# Patient Record
Sex: MALE | Race: WHITE | Employment: OTHER | URBAN - NONMETROPOLITAN AREA
[De-identification: names, ages, dates, MRNs, and addresses within clinical notes are randomized per-mention and may not be internally consistent; named-entity substitution may affect disease eponyms.]

---

## 2017-03-02 ENCOUNTER — OFFICE VISIT (OUTPATIENT)
Dept: FAMILY MEDICINE CLINIC | Age: 57
End: 2017-03-02
Payer: MEDICARE

## 2017-03-02 ENCOUNTER — HOSPITAL ENCOUNTER (OUTPATIENT)
Dept: OTHER | Age: 57
Discharge: HOME | End: 2017-03-02
Attending: FAMILY MEDICINE | Admitting: FAMILY MEDICINE

## 2017-03-02 VITALS
RESPIRATION RATE: 18 BRPM | OXYGEN SATURATION: 98 % | HEIGHT: 71 IN | WEIGHT: 220 LBS | BODY MASS INDEX: 30.8 KG/M2 | DIASTOLIC BLOOD PRESSURE: 72 MMHG | HEART RATE: 77 BPM | SYSTOLIC BLOOD PRESSURE: 122 MMHG

## 2017-03-02 DIAGNOSIS — E11.8 TYPE 2 DIABETES MELLITUS WITH COMPLICATION, WITH LONG-TERM CURRENT USE OF INSULIN (HCC): Primary | ICD-10-CM

## 2017-03-02 DIAGNOSIS — R53.83 FATIGUE, UNSPECIFIED TYPE: ICD-10-CM

## 2017-03-02 DIAGNOSIS — R21 RASH OF BODY: ICD-10-CM

## 2017-03-02 DIAGNOSIS — Z79.4 TYPE 2 DIABETES MELLITUS WITH COMPLICATION, WITH LONG-TERM CURRENT USE OF INSULIN (HCC): ICD-10-CM

## 2017-03-02 DIAGNOSIS — Z79.4 TYPE 2 DIABETES MELLITUS WITH COMPLICATION, WITH LONG-TERM CURRENT USE OF INSULIN (HCC): Primary | ICD-10-CM

## 2017-03-02 DIAGNOSIS — E11.8 TYPE 2 DIABETES MELLITUS WITH COMPLICATION, WITH LONG-TERM CURRENT USE OF INSULIN (HCC): ICD-10-CM

## 2017-03-02 LAB
A/G RATIO: 1.4 (ref 0.8–2)
ALBUMIN SERPL-MCNC: 4 G/DL (ref 3.4–4.8)
ALP BLD-CCNC: 196 U/L (ref 25–100)
ALT SERPL-CCNC: 24 U/L (ref 4–36)
ANION GAP SERPL CALCULATED.3IONS-SCNC: 11 MMOL/L (ref 3–16)
AST SERPL-CCNC: 26 U/L (ref 8–33)
BILIRUB SERPL-MCNC: 0.7 MG/DL (ref 0.3–1.2)
BUN BLDV-MCNC: 20 MG/DL (ref 6–20)
CALCIUM SERPL-MCNC: 9.4 MG/DL (ref 8.5–10.5)
CHLORIDE BLD-SCNC: 100 MMOL/L (ref 98–107)
CHOLESTEROL, TOTAL: 123 MG/DL (ref 0–200)
CO2: 28 MMOL/L (ref 20–30)
CREAT SERPL-MCNC: 1.4 MG/DL (ref 0.4–1.2)
GAMMA GLUTAMYL TRANSFERASE: 203 U/L (ref 8–61)
GFR AFRICAN AMERICAN: >59
GFR NON-AFRICAN AMERICAN: 52
GLOBULIN: 2.8 G/DL
GLUCOSE BLD-MCNC: 190 MG/DL (ref 74–106)
HBA1C MFR BLD: 7.7 %
HCT VFR BLD CALC: 46.5 % (ref 40–54)
HDLC SERPL-MCNC: 33 MG/DL (ref 40–60)
HEMOGLOBIN: 15.4 G/DL (ref 13–18)
LDL CHOLESTEROL CALCULATED: 56 MG/DL
MAGNESIUM: 2 MG/DL (ref 1.7–2.4)
MCH RBC QN AUTO: 28.4 PG (ref 27–32)
MCHC RBC AUTO-ENTMCNC: 33.1 G/DL (ref 31–35)
MCV RBC AUTO: 85.6 FL (ref 80–100)
PDW BLD-RTO: 15.8 % (ref 11–16)
PLATELET # BLD: 179 K/UL (ref 150–400)
PMV BLD AUTO: 10.1 FL (ref 6–10)
POTASSIUM SERPL-SCNC: 5.2 MMOL/L (ref 3.4–5.1)
RBC # BLD: 5.43 M/UL (ref 4.5–6)
SODIUM BLD-SCNC: 139 MMOL/L (ref 136–145)
TOTAL PROTEIN: 6.8 G/DL (ref 6.4–8.3)
TRIGL SERPL-MCNC: 171 MG/DL (ref 0–249)
TSH SERPL DL<=0.05 MIU/L-ACNC: 7.95 UIU/ML (ref 0.35–5.5)
VLDLC SERPL CALC-MCNC: 34 MG/DL
WBC # BLD: 11.3 K/UL (ref 4–11)

## 2017-03-02 PROCEDURE — 99214 OFFICE O/P EST MOD 30 MIN: CPT | Performed by: FAMILY MEDICINE

## 2017-03-02 RX ORDER — LEVOTHYROXINE SODIUM 50 MCG
50 TABLET ORAL DAILY
Qty: 30 TABLET | Refills: 5 | Status: SHIPPED | OUTPATIENT
Start: 2017-03-02 | End: 2017-06-02

## 2017-03-02 RX ORDER — OXYCODONE AND ACETAMINOPHEN 10; 325 MG/1; MG/1
1 TABLET ORAL EVERY 6 HOURS PRN
Qty: 90 TABLET | Refills: 0 | Status: SHIPPED | OUTPATIENT
Start: 2017-03-02 | End: 2017-06-01 | Stop reason: SDUPTHER

## 2017-03-02 RX ORDER — PRAVASTATIN SODIUM 40 MG
TABLET ORAL
Qty: 30 TABLET | Refills: 5 | Status: SHIPPED | OUTPATIENT
Start: 2017-03-02 | End: 2017-08-28 | Stop reason: SDUPTHER

## 2017-03-02 ASSESSMENT — ENCOUNTER SYMPTOMS: BACK PAIN: 1

## 2017-06-01 ENCOUNTER — OFFICE VISIT (OUTPATIENT)
Dept: FAMILY MEDICINE CLINIC | Age: 57
End: 2017-06-01
Payer: MEDICARE

## 2017-06-01 ENCOUNTER — HOSPITAL ENCOUNTER (OUTPATIENT)
Dept: OTHER | Age: 57
Discharge: HOME | End: 2017-06-01
Attending: FAMILY MEDICINE | Admitting: FAMILY MEDICINE

## 2017-06-01 VITALS
OXYGEN SATURATION: 97 % | HEIGHT: 71 IN | RESPIRATION RATE: 18 BRPM | BODY MASS INDEX: 30.24 KG/M2 | WEIGHT: 216 LBS | HEART RATE: 84 BPM | SYSTOLIC BLOOD PRESSURE: 110 MMHG | DIASTOLIC BLOOD PRESSURE: 60 MMHG

## 2017-06-01 DIAGNOSIS — I10 ESSENTIAL HYPERTENSION: ICD-10-CM

## 2017-06-01 DIAGNOSIS — Z79.4 TYPE 2 DIABETES MELLITUS WITH COMPLICATION, WITH LONG-TERM CURRENT USE OF INSULIN (HCC): Primary | ICD-10-CM

## 2017-06-01 DIAGNOSIS — G89.29 CHRONIC MIDLINE LOW BACK PAIN WITH BILATERAL SCIATICA: ICD-10-CM

## 2017-06-01 DIAGNOSIS — M54.42 CHRONIC MIDLINE LOW BACK PAIN WITH BILATERAL SCIATICA: ICD-10-CM

## 2017-06-01 DIAGNOSIS — M19.90 ARTHRITIS: ICD-10-CM

## 2017-06-01 DIAGNOSIS — E11.8 TYPE 2 DIABETES MELLITUS WITH COMPLICATION, WITH LONG-TERM CURRENT USE OF INSULIN (HCC): Primary | ICD-10-CM

## 2017-06-01 DIAGNOSIS — E03.9 HYPOTHYROIDISM, UNSPECIFIED TYPE: ICD-10-CM

## 2017-06-01 DIAGNOSIS — E11.8 TYPE 2 DIABETES MELLITUS WITH COMPLICATION, WITH LONG-TERM CURRENT USE OF INSULIN (HCC): ICD-10-CM

## 2017-06-01 DIAGNOSIS — E11.9 COMPREHENSIVE DIABETIC FOOT EXAMINATION, TYPE 2 DM, ENCOUNTER FOR (HCC): ICD-10-CM

## 2017-06-01 DIAGNOSIS — M54.41 CHRONIC MIDLINE LOW BACK PAIN WITH BILATERAL SCIATICA: ICD-10-CM

## 2017-06-01 DIAGNOSIS — Z79.4 TYPE 2 DIABETES MELLITUS WITH COMPLICATION, WITH LONG-TERM CURRENT USE OF INSULIN (HCC): ICD-10-CM

## 2017-06-01 LAB
A/G RATIO: 1.3 (ref 0.8–2)
ALBUMIN SERPL-MCNC: 4.1 G/DL (ref 3.4–4.8)
ALP BLD-CCNC: 200 U/L (ref 25–100)
ALT SERPL-CCNC: 26 U/L (ref 4–36)
ANION GAP SERPL CALCULATED.3IONS-SCNC: 14 MMOL/L (ref 3–16)
AST SERPL-CCNC: 32 U/L (ref 8–33)
BILIRUB SERPL-MCNC: 0.9 MG/DL (ref 0.3–1.2)
BUN BLDV-MCNC: 23 MG/DL (ref 6–20)
CALCIUM SERPL-MCNC: 9.4 MG/DL (ref 8.5–10.5)
CHLORIDE BLD-SCNC: 99 MMOL/L (ref 98–107)
CO2: 27 MMOL/L (ref 20–30)
CREAT SERPL-MCNC: 1.5 MG/DL (ref 0.4–1.2)
GFR AFRICAN AMERICAN: 59
GFR NON-AFRICAN AMERICAN: 48
GLOBULIN: 3.2 G/DL
GLUCOSE BLD-MCNC: 173 MG/DL (ref 74–106)
HBA1C MFR BLD: 7.9 %
HCT VFR BLD CALC: 49.2 % (ref 40–54)
HEMOGLOBIN: 16.4 G/DL (ref 13–18)
MAGNESIUM: 2.1 MG/DL (ref 1.7–2.4)
MCH RBC QN AUTO: 28.9 PG (ref 27–32)
MCHC RBC AUTO-ENTMCNC: 33.3 G/DL (ref 31–35)
MCV RBC AUTO: 86.8 FL (ref 80–100)
PDW BLD-RTO: 16.2 % (ref 11–16)
PLATELET # BLD: 192 K/UL (ref 150–400)
PMV BLD AUTO: 9.8 FL (ref 6–10)
POTASSIUM SERPL-SCNC: 4.9 MMOL/L (ref 3.4–5.1)
RBC # BLD: 5.67 M/UL (ref 4.5–6)
SODIUM BLD-SCNC: 140 MMOL/L (ref 136–145)
TOTAL PROTEIN: 7.3 G/DL (ref 6.4–8.3)
TSH SERPL DL<=0.05 MIU/L-ACNC: 7.76 UIU/ML (ref 0.35–5.5)
WBC # BLD: 11.8 K/UL (ref 4–11)

## 2017-06-01 PROCEDURE — 99214 OFFICE O/P EST MOD 30 MIN: CPT | Performed by: FAMILY MEDICINE

## 2017-06-01 RX ORDER — OXYCODONE AND ACETAMINOPHEN 10; 325 MG/1; MG/1
1 TABLET ORAL EVERY 6 HOURS PRN
Qty: 90 TABLET | Refills: 0 | Status: SHIPPED | OUTPATIENT
Start: 2017-06-01 | End: 2017-08-28 | Stop reason: SDUPTHER

## 2017-06-01 RX ORDER — GABAPENTIN 300 MG/1
CAPSULE ORAL
Qty: 90 CAPSULE | Refills: 5 | Status: SHIPPED | OUTPATIENT
Start: 2017-06-01 | End: 2018-02-19 | Stop reason: SDUPTHER

## 2017-06-01 RX ORDER — TACROLIMUS 1 MG/1
1 CAPSULE ORAL 2 TIMES DAILY
Qty: 60 CAPSULE | Refills: 5 | Status: SHIPPED | OUTPATIENT
Start: 2017-06-01 | End: 2018-02-19 | Stop reason: SDUPTHER

## 2017-06-01 RX ORDER — TRAZODONE HYDROCHLORIDE 150 MG/1
150 TABLET ORAL NIGHTLY
Qty: 30 TABLET | Refills: 5 | Status: SHIPPED | OUTPATIENT
Start: 2017-06-01 | End: 2018-01-11 | Stop reason: SDUPTHER

## 2017-06-01 ASSESSMENT — PATIENT HEALTH QUESTIONNAIRE - PHQ9
1. LITTLE INTEREST OR PLEASURE IN DOING THINGS: 0
2. FEELING DOWN, DEPRESSED OR HOPELESS: 0
SUM OF ALL RESPONSES TO PHQ9 QUESTIONS 1 & 2: 0
SUM OF ALL RESPONSES TO PHQ QUESTIONS 1-9: 0

## 2017-06-01 ASSESSMENT — ENCOUNTER SYMPTOMS: BACK PAIN: 1

## 2017-06-02 LAB — GAMMA GLUTAMYL TRANSFERASE: 232 U/L (ref 8–61)

## 2017-06-02 RX ORDER — LEVOTHYROXINE SODIUM 75 MCG
75 TABLET ORAL DAILY
Qty: 30 TABLET | Refills: 2 | Status: SHIPPED | OUTPATIENT
Start: 2017-06-02 | End: 2017-08-28 | Stop reason: SDUPTHER

## 2017-06-06 ENCOUNTER — TELEPHONE (OUTPATIENT)
Dept: FAMILY MEDICINE CLINIC | Age: 57
End: 2017-06-06

## 2017-06-21 ENCOUNTER — TELEPHONE (OUTPATIENT)
Dept: FAMILY MEDICINE CLINIC | Age: 57
End: 2017-06-21

## 2017-08-28 ENCOUNTER — OFFICE VISIT (OUTPATIENT)
Dept: FAMILY MEDICINE CLINIC | Age: 57
End: 2017-08-28
Payer: MEDICARE

## 2017-08-28 ENCOUNTER — HOSPITAL ENCOUNTER (OUTPATIENT)
Dept: OTHER | Age: 57
Discharge: HOME | End: 2017-08-28
Attending: TRANSPLANT SURGERY | Admitting: TRANSPLANT SURGERY

## 2017-08-28 VITALS
RESPIRATION RATE: 18 BRPM | OXYGEN SATURATION: 98 % | SYSTOLIC BLOOD PRESSURE: 110 MMHG | DIASTOLIC BLOOD PRESSURE: 62 MMHG | HEIGHT: 71 IN | BODY MASS INDEX: 30.63 KG/M2 | WEIGHT: 218.8 LBS | HEART RATE: 77 BPM

## 2017-08-28 DIAGNOSIS — M54.41 CHRONIC MIDLINE LOW BACK PAIN WITH BILATERAL SCIATICA: ICD-10-CM

## 2017-08-28 DIAGNOSIS — E11.8 TYPE 2 DIABETES MELLITUS WITH COMPLICATION, WITH LONG-TERM CURRENT USE OF INSULIN (HCC): Primary | ICD-10-CM

## 2017-08-28 DIAGNOSIS — E03.9 HYPOTHYROIDISM, UNSPECIFIED TYPE: ICD-10-CM

## 2017-08-28 DIAGNOSIS — Z94.4 HISTORY OF LIVER TRANSPLANT (HCC): ICD-10-CM

## 2017-08-28 DIAGNOSIS — E11.8 TYPE 2 DIABETES MELLITUS WITH COMPLICATION, WITH LONG-TERM CURRENT USE OF INSULIN (HCC): ICD-10-CM

## 2017-08-28 DIAGNOSIS — Z79.4 TYPE 2 DIABETES MELLITUS WITH COMPLICATION, WITH LONG-TERM CURRENT USE OF INSULIN (HCC): ICD-10-CM

## 2017-08-28 DIAGNOSIS — I10 ESSENTIAL HYPERTENSION: ICD-10-CM

## 2017-08-28 DIAGNOSIS — M54.42 CHRONIC MIDLINE LOW BACK PAIN WITH BILATERAL SCIATICA: ICD-10-CM

## 2017-08-28 DIAGNOSIS — G89.29 CHRONIC MIDLINE LOW BACK PAIN WITH BILATERAL SCIATICA: ICD-10-CM

## 2017-08-28 DIAGNOSIS — M19.90 ARTHRITIS: ICD-10-CM

## 2017-08-28 DIAGNOSIS — Z79.4 TYPE 2 DIABETES MELLITUS WITH COMPLICATION, WITH LONG-TERM CURRENT USE OF INSULIN (HCC): Primary | ICD-10-CM

## 2017-08-28 DIAGNOSIS — F41.9 ANXIETY: ICD-10-CM

## 2017-08-28 PROCEDURE — 99214 OFFICE O/P EST MOD 30 MIN: CPT | Performed by: FAMILY MEDICINE

## 2017-08-28 RX ORDER — CLONAZEPAM 1 MG/1
1 TABLET ORAL 2 TIMES DAILY PRN
Qty: 60 TABLET | Refills: 2 | Status: SHIPPED | OUTPATIENT
Start: 2017-08-28 | End: 2018-02-19 | Stop reason: SDUPTHER

## 2017-08-28 RX ORDER — LANOLIN ALCOHOL/MO/W.PET/CERES
400 CREAM (GRAM) TOPICAL DAILY
Qty: 30 TABLET | Refills: 5 | Status: SHIPPED | OUTPATIENT
Start: 2017-08-28 | End: 2017-11-28 | Stop reason: ALTCHOICE

## 2017-08-28 RX ORDER — LEVOTHYROXINE SODIUM 75 MCG
75 TABLET ORAL DAILY
Qty: 30 TABLET | Refills: 5 | Status: SHIPPED | OUTPATIENT
Start: 2017-08-28 | End: 2018-02-19 | Stop reason: SDUPTHER

## 2017-08-28 RX ORDER — PRAVASTATIN SODIUM 40 MG
TABLET ORAL
Qty: 30 TABLET | Refills: 5 | Status: SHIPPED | OUTPATIENT
Start: 2017-08-28 | End: 2018-03-20 | Stop reason: SDUPTHER

## 2017-08-28 RX ORDER — OXYCODONE AND ACETAMINOPHEN 10; 325 MG/1; MG/1
1 TABLET ORAL EVERY 6 HOURS PRN
Qty: 90 TABLET | Refills: 0 | Status: SHIPPED | OUTPATIENT
Start: 2017-08-28 | End: 2017-11-28 | Stop reason: SDUPTHER

## 2017-08-28 ASSESSMENT — ENCOUNTER SYMPTOMS: BACK PAIN: 1

## 2017-08-29 LAB
A/G RATIO: 1.2 (ref 0.8–2)
ALBUMIN SERPL-MCNC: 4.1 G/DL (ref 3.4–4.8)
ALP BLD-CCNC: 176 U/L (ref 25–100)
ALT SERPL-CCNC: 24 U/L (ref 4–36)
ANION GAP SERPL CALCULATED.3IONS-SCNC: 13 MMOL/L (ref 3–16)
AST SERPL-CCNC: 26 U/L (ref 8–33)
BASOPHILS ABSOLUTE: 0.1 K/UL (ref 0–0.1)
BASOPHILS RELATIVE PERCENT: 1 %
BILIRUB SERPL-MCNC: 0.7 MG/DL (ref 0.3–1.2)
BUN BLDV-MCNC: 22 MG/DL (ref 6–20)
CALCIUM SERPL-MCNC: 9.5 MG/DL (ref 8.5–10.5)
CHLORIDE BLD-SCNC: 96 MMOL/L (ref 98–107)
CO2: 27 MMOL/L (ref 20–30)
CREAT SERPL-MCNC: 1.4 MG/DL (ref 0.4–1.2)
CREATININE URINE: 145.4 MG/DL (ref 1.5–300)
EOSINOPHILS ABSOLUTE: 0.1 K/UL (ref 0–0.4)
EOSINOPHILS RELATIVE PERCENT: 1.2 %
GAMMA GLUTAMYL TRANSFERASE: 175 U/L (ref 8–61)
GFR AFRICAN AMERICAN: >59
GFR NON-AFRICAN AMERICAN: 52
GLOBULIN: 3.5 G/DL
GLUCOSE BLD-MCNC: 173 MG/DL (ref 74–106)
HBA1C MFR BLD: 7.8 %
HCT VFR BLD CALC: 47.1 % (ref 40–54)
HEMOGLOBIN: 15.8 G/DL (ref 13–18)
LYMPHOCYTES ABSOLUTE: 2.5 K/UL (ref 1.5–4)
LYMPHOCYTES RELATIVE PERCENT: 23.1 % (ref 20–40)
MAGNESIUM: 2.2 MG/DL (ref 1.7–2.4)
MCH RBC QN AUTO: 28.7 PG (ref 27–32)
MCHC RBC AUTO-ENTMCNC: 33.5 G/DL (ref 31–35)
MCV RBC AUTO: 85.5 FL (ref 80–100)
MICROALBUMIN UR-MCNC: 359.7 MG/DL (ref 0–22)
MICROALBUMIN/CREAT UR-RTO: 2473.9 MG/G (ref 0–30)
MONOCYTES ABSOLUTE: 0.8 K/UL (ref 0.2–0.8)
MONOCYTES RELATIVE PERCENT: 7.6 % (ref 3–10)
NEUTROPHILS ABSOLUTE: 7.3 K/UL (ref 2–7.5)
NEUTROPHILS RELATIVE PERCENT: 67.1 %
PDW BLD-RTO: 15.7 % (ref 11–16)
PLATELET # BLD: 196 K/UL (ref 150–400)
PMV BLD AUTO: 10.5 FL (ref 6–10)
POTASSIUM SERPL-SCNC: 5 MMOL/L (ref 3.4–5.1)
RBC # BLD: 5.51 M/UL (ref 4.5–6)
SODIUM BLD-SCNC: 136 MMOL/L (ref 136–145)
TOTAL PROTEIN: 7.6 G/DL (ref 6.4–8.3)
TSH SERPL DL<=0.05 MIU/L-ACNC: 4.99 UIU/ML (ref 0.35–5.5)
WBC # BLD: 10.9 K/UL (ref 4–11)

## 2017-11-28 ENCOUNTER — OFFICE VISIT (OUTPATIENT)
Dept: FAMILY MEDICINE CLINIC | Age: 57
End: 2017-11-28
Payer: MEDICARE

## 2017-11-28 ENCOUNTER — HOSPITAL ENCOUNTER (OUTPATIENT)
Dept: OTHER | Age: 57
Discharge: HOME | End: 2017-11-28
Attending: TRANSPLANT SURGERY | Admitting: TRANSPLANT SURGERY

## 2017-11-28 VITALS
HEART RATE: 76 BPM | DIASTOLIC BLOOD PRESSURE: 72 MMHG | WEIGHT: 227 LBS | SYSTOLIC BLOOD PRESSURE: 128 MMHG | RESPIRATION RATE: 18 BRPM | HEIGHT: 71 IN | BODY MASS INDEX: 31.78 KG/M2 | OXYGEN SATURATION: 98 %

## 2017-11-28 DIAGNOSIS — G89.29 CHRONIC MIDLINE LOW BACK PAIN WITH BILATERAL SCIATICA: ICD-10-CM

## 2017-11-28 DIAGNOSIS — E11.8 TYPE 2 DIABETES MELLITUS WITH COMPLICATION, WITH LONG-TERM CURRENT USE OF INSULIN (HCC): ICD-10-CM

## 2017-11-28 DIAGNOSIS — M54.41 CHRONIC MIDLINE LOW BACK PAIN WITH BILATERAL SCIATICA: ICD-10-CM

## 2017-11-28 DIAGNOSIS — Z94.4 HISTORY OF LIVER TRANSPLANT (HCC): ICD-10-CM

## 2017-11-28 DIAGNOSIS — M19.90 ARTHRITIS: ICD-10-CM

## 2017-11-28 DIAGNOSIS — Z79.4 TYPE 2 DIABETES MELLITUS WITH COMPLICATION, WITH LONG-TERM CURRENT USE OF INSULIN (HCC): Primary | ICD-10-CM

## 2017-11-28 DIAGNOSIS — E11.8 TYPE 2 DIABETES MELLITUS WITH COMPLICATION, WITH LONG-TERM CURRENT USE OF INSULIN (HCC): Primary | ICD-10-CM

## 2017-11-28 DIAGNOSIS — I10 ESSENTIAL HYPERTENSION: ICD-10-CM

## 2017-11-28 DIAGNOSIS — M54.42 CHRONIC MIDLINE LOW BACK PAIN WITH BILATERAL SCIATICA: ICD-10-CM

## 2017-11-28 DIAGNOSIS — Z79.4 TYPE 2 DIABETES MELLITUS WITH COMPLICATION, WITH LONG-TERM CURRENT USE OF INSULIN (HCC): ICD-10-CM

## 2017-11-28 PROCEDURE — G0009 ADMIN PNEUMOCOCCAL VACCINE: HCPCS | Performed by: FAMILY MEDICINE

## 2017-11-28 PROCEDURE — 90670 PCV13 VACCINE IM: CPT | Performed by: FAMILY MEDICINE

## 2017-11-28 PROCEDURE — 99214 OFFICE O/P EST MOD 30 MIN: CPT | Performed by: FAMILY MEDICINE

## 2017-11-28 RX ORDER — OXYCODONE AND ACETAMINOPHEN 10; 325 MG/1; MG/1
1 TABLET ORAL EVERY 6 HOURS PRN
Qty: 90 TABLET | Refills: 0 | Status: SHIPPED | OUTPATIENT
Start: 2017-11-28 | End: 2018-02-19 | Stop reason: SDUPTHER

## 2017-11-28 ASSESSMENT — ENCOUNTER SYMPTOMS: BACK PAIN: 1

## 2017-11-28 NOTE — PATIENT INSTRUCTIONS
they seem to have the same symptoms. What may be good for you may be harmful to others. · If you are no longer taking a prescribed medication and you have pills left please take your pills out of their original containers. Mix crushed pills with an undesirable substance, such as cat litter or used coffee grounds. Put the mixture into a disposable container with a lid, such as an empty margarine tub, or into a sealable bag. Cover up or remove any of your personal information on the empty containers by covering it with black permanent marker or duct tape. Place the sealed container with the mixture, and the empty drug containers, in the trash. · If you use a medication that is in the form of a patch, dispose of used patches by folding them in half so that the sticky sides meet, and then flushing them down a toilet. They should not be placed in the household trash where children or pets can find them. · If you have any questions, ask your provider or pharmacist for more information. · Be sure to keep all appointments for provider visits or tests.   Ready to quit: Not Answered  Counseling given: Not Answered

## 2017-11-28 NOTE — LETTER
equipment. They may cause problems with breathing, sleep apnea, reduced coughing, these are especially dangerous for patients with lung disease. The medications may also lower testosterone, loss of bone strength, stamina and sex drive. For opiate medications, women who are of child bearing age 14-53 who could become pregnant should weigh the risks carefully with their physician as these medications make pregnancy more risky and the baby could be born sick and or addicted and have complications. For opiate medications, its possible to have dangerous interactions with alcohol and other medications. You may have an increase in pain called hyperalgesia, opioid medications can affect the brain and nerves that may cause increased pain and you may have trouble getting pain relief. To reduce the risks of harm to patients, we work to address lowering pain medication and improving your function. Dependence withdrawal symptoms may include; feelings of uneasiness, increased pain, irritability, belly pain, diarrhea, sweats and goose-flesh. 1. I understand that I have the following responsibilities:    ? I will take medications at the dose and frequency prescribed. ? I will not increase or change how I take my medications without the approval of the health care provider who signs this Medication Agreement. ? I will arrange for refills at the prescribed interval ONLY during regular office hours. I will not ask for refills earlier than agreed, after-hours, on holidays or on weekends. ? I will obtain all refills for medications at [facility] , with full consent for my provider and pharmacist to exchange information in writing or verbally. ? I will not request any pain medications or controlled substances from other providers and will inform this provider of all other medications I am taking.      ? I will inform my other health care providers that I am taking these medications and of the existence of this MEDICATION AGREEMENT. In the event of an emergency, I will provide the same information to the emergency department providers. ? I will protect my prescriptions and medications. I understand that lost or misplaced prescriptions will not be replaced. ? I will keep medications only for my own use and will not share them with others. I will keep all medications away from children    ? I agree to participate in any medical, psychological or psychiatric assessments recommended by my provider. ? I will actively participate in any program designed to improve function, including social, physical, psychological and daily or work activities. 2. I will not use illegal or street drugs or another persons prescription. If I have an addiction problem with drugs or alcohol and my provider asks me to enter a program to address this issue, I agree to follow through. Such programs may include:     ? 12-Step Program and securing a sponsor    ? Individual counseling    ? Inpatient or outpatient treatment    ? Other___________________________    If in treatment, I will request that a copy of the programs initial evaluation and treatment recommendations be sent to this provider and will not expect refills until that is received. I will also request written monthly updates be sent to this provider to verify my continuing treatment. 3. I will consent to random drug screening to assure I am only taking the prescribed drugs, described in this MEDICATION AGREEMENT. I understand that a drug screen is a laboratory test in which a sample of my urine or blood is checked to see what drugs I have been taking. 4. I will keep all my scheduled appointments. If I need to cancel my appointment I will do so, a minimum of 24 hours before it is scheduled.      5. I will consent to random medication (pill) counts that are deemed necessary by my provider as a result of suspicious/unpredictable behavior or inappropriate drug screen results. I understand if contacted for a random count it is my responsibility to bring all medications listed on this MEDICATION AGREEMENT to the providers office at time of count. 6. I understand that this provider may stop prescribing the medications listed if:    ? I do not show any improvement in pain or my activity has not improved    ? I develop rapid tolerance or loss of improvement as described in my treatment plan    ? I develop significant side effects from the medication    ? My behavior is inconsistent with the responsibilities outlined above, which may also result in being prevented from receiving further care from this office. AGREEMENT: I have read the above and have had all my questions answered. For chronic pain management, I know that chronic pain can be managed with many types of treatments. A chronic opioid trial may be part of my treatment but I must be an active participant in my care. Opioid medication is only one part of my pain management. There is limited scientific data to suggest that using opioids over 4 months will lower my pain and or improve my daily function. There is some scientific information that suggests using chronic opioids can increase my pain, make me feel less well, and increase my risk of unintentional death directly related to the opioid medication. I know that if my provider feels my risk from controlled medications and or opioid therapy is greater than my benefit, I will have my controlled substance medications and or opioid medication compassionately lowered or removed altogether. I agree to a controlled substance medication or chronic opioid trial.     I further agree to allow this office to contact family or friends if there are concerns about my safety and use of the controlled medications. BALBIR Wilde have agreed to use the medications listed on this MEDICATION AGREEMENT as instructed by my physician and as stated in this Medication Agreement.        Patient Signature___________________________________________Date_________    Patient Printed Name_______________________________________      Provider Signature_________________________________________Date_________

## 2017-11-28 NOTE — PROGRESS NOTES
SUBJECTIVE:    Patient ID: Marysol Whitehead is a 65 y.o. male. Chief Complaint   Patient presents with    Diabetes     follow up    Anxiety     follow up    Back Pain     follow up       HPI: he is not checking his blood sugars on a regular basis. He has not been taking his insulin every single night like he should. He says he realizes that should do better but he's just struggling with that. He is having some anxiety though his back pain and weakness are somewhat worse with the cool and damp weather. He is not have any other medication problems. He denies any chest pain or shortness of breath. Review of Systems   Musculoskeletal: Positive for back pain and gait problem. All other systems reviewed and are negative. OBJECTIVE:  Wt Readings from Last 3 Encounters:   11/28/17 227 lb (103 kg)   08/28/17 218 lb 12.8 oz (99.2 kg)   06/01/17 216 lb (98 kg)     BP Readings from Last 3 Encounters:   11/28/17 128/72   08/28/17 110/62   06/01/17 110/60      Pulse Readings from Last 3 Encounters:   11/28/17 76   08/28/17 77   06/01/17 84     Body mass index is 31.66 kg/m². Resp Readings from Last 3 Encounters:   11/28/17 18   08/28/17 18   06/01/17 18     Physical Exam   Constitutional: He is oriented to person, place, and time. Vital signs are normal. He appears well-developed and well-nourished. HENT:   Head: Normocephalic and atraumatic. Right Ear: Hearing and external ear normal.   Left Ear: Hearing and external ear normal.   Nose: Nose normal.   Mouth/Throat: Uvula is midline, oropharynx is clear and moist and mucous membranes are normal.   Eyes: Conjunctivae, EOM and lids are normal. Pupils are equal, round, and reactive to light. Neck: Trachea normal, normal range of motion and phonation normal. Neck supple. Carotid bruit is not present. No thyroid mass and no thyromegaly present.    Cardiovascular: Normal rate, regular rhythm, S1 normal, S2 normal, normal heart sounds, intact distal pulses and normal pulses. Exam reveals no gallop and no friction rub. No murmur heard. Pulmonary/Chest: Effort normal and breath sounds normal.   Musculoskeletal:        Lumbar back: He exhibits decreased range of motion, tenderness, pain and spasm. Neurological: He is alert and oriented to person, place, and time. Skin: Skin is warm and dry. Psychiatric: His mood appears anxious. Nursing note and vitals reviewed. No results found for requested labs within last 30 days. Hemoglobin A1C (%)   Date Value   08/28/2017 7.8 (H)     MICROALBUMIN, RANDOM URINE (mg/dL)   Date Value   08/28/2017 359.70 (H)     LDL Calculated (mg/dL)   Date Value   03/02/2017 56         Lab Results   Component Value Date    WBC 10.9 08/28/2017    NEUTROABS 7.3 08/28/2017    HGB 15.8 08/28/2017    HCT 47.1 08/28/2017    MCV 85.5 08/28/2017     08/28/2017       Lab Results   Component Value Date    TSH 4.99 08/28/2017         ASSESSMENT:   1. Type 2 diabetes mellitus with complication, with long-term current use of insulin ([facility])  COMPREHENSIVE METABOLIC PANEL    HEMOGLOBIN A1C   2. Arthritis     3. Essential hypertension     4. History of liver transplant ([facility] [address]     5. Chronic midline low back pain with bilateral sciatica          PLAN:  Orders Placed This Encounter   Medications    oxyCODONE-acetaminophen (PERCOCET)  MG per tablet     Sig: Take 1 tablet by mouth every 6 hours as needed for Pain . Earliest Fill Date: 11/28/17     Dispense:  90 tablet     Refill:  0        Medications Discontinued During This Encounter   Medication Reason    magnesium oxide (MAG-OX) 400 (240 Mg) MG tablet Discontinued by another clinician    oxyCODONE-acetaminophen (PERCOCET)  MG per tablet Reorder       Controlled Substances Monitoring: Attestation: The Prescription Monitoring Report for this patient was reviewed today.  Janine Thacker MD)  Documentation: No signs of potential drug abuse or diversion identified., Medication

## 2017-11-29 LAB
A/G RATIO: 1.3 (ref 0.8–2)
ALBUMIN SERPL-MCNC: 4 G/DL (ref 3.4–4.8)
ALP BLD-CCNC: 206 U/L (ref 25–100)
ALT SERPL-CCNC: 31 U/L (ref 4–36)
ANION GAP SERPL CALCULATED.3IONS-SCNC: 12 MMOL/L (ref 3–16)
AST SERPL-CCNC: 30 U/L (ref 8–33)
BILIRUB SERPL-MCNC: 0.4 MG/DL (ref 0.3–1.2)
BUN BLDV-MCNC: 24 MG/DL (ref 6–20)
CALCIUM SERPL-MCNC: 9.1 MG/DL (ref 8.5–10.5)
CHLORIDE BLD-SCNC: 100 MMOL/L (ref 98–107)
CO2: 28 MMOL/L (ref 20–30)
CREAT SERPL-MCNC: 1.3 MG/DL (ref 0.4–1.2)
GAMMA GLUTAMYL TRANSFERASE: 233 U/L (ref 8–61)
GFR AFRICAN AMERICAN: >59
GFR NON-AFRICAN AMERICAN: 57
GLOBULIN: 3.2 G/DL
GLUCOSE BLD-MCNC: 194 MG/DL (ref 74–106)
HBA1C MFR BLD: 8.1 %
HCT VFR BLD CALC: 44.7 % (ref 40–54)
HEMOGLOBIN: 14.9 G/DL (ref 13–18)
MAGNESIUM: 2 MG/DL (ref 1.7–2.4)
MCH RBC QN AUTO: 28.9 PG (ref 27–32)
MCHC RBC AUTO-ENTMCNC: 33.3 G/DL (ref 31–35)
MCV RBC AUTO: 86.8 FL (ref 80–100)
PDW BLD-RTO: 16 % (ref 11–16)
PLATELET # BLD: 170 K/UL (ref 150–400)
PMV BLD AUTO: 10.8 FL (ref 6–10)
POTASSIUM SERPL-SCNC: 5 MMOL/L (ref 3.4–5.1)
RBC # BLD: 5.15 M/UL (ref 4.5–6)
SODIUM BLD-SCNC: 140 MMOL/L (ref 136–145)
TOTAL PROTEIN: 7.2 G/DL (ref 6.4–8.3)
WBC # BLD: 10.6 K/UL (ref 4–11)

## 2018-01-11 RX ORDER — GABAPENTIN 300 MG/1
CAPSULE ORAL
Qty: 90 CAPSULE | Refills: 0 | Status: SHIPPED | OUTPATIENT
Start: 2018-01-11 | End: 2018-05-15 | Stop reason: SDUPTHER

## 2018-01-11 RX ORDER — TRAZODONE HYDROCHLORIDE 150 MG/1
TABLET ORAL
Qty: 30 TABLET | Refills: 4 | Status: SHIPPED | OUTPATIENT
Start: 2018-01-11 | End: 2018-05-15 | Stop reason: SDUPTHER

## 2018-02-19 ENCOUNTER — OFFICE VISIT (OUTPATIENT)
Dept: FAMILY MEDICINE CLINIC | Age: 58
End: 2018-02-19
Payer: MEDICARE

## 2018-02-19 ENCOUNTER — HOSPITAL ENCOUNTER (OUTPATIENT)
Dept: OTHER | Age: 58
Discharge: HOME | End: 2018-02-19
Attending: TRANSPLANT SURGERY | Admitting: TRANSPLANT SURGERY

## 2018-02-19 VITALS
WEIGHT: 246 LBS | DIASTOLIC BLOOD PRESSURE: 72 MMHG | SYSTOLIC BLOOD PRESSURE: 110 MMHG | HEIGHT: 71 IN | BODY MASS INDEX: 34.44 KG/M2 | HEART RATE: 88 BPM | OXYGEN SATURATION: 98 % | RESPIRATION RATE: 20 BRPM

## 2018-02-19 DIAGNOSIS — M54.41 CHRONIC MIDLINE LOW BACK PAIN WITH BILATERAL SCIATICA: ICD-10-CM

## 2018-02-19 DIAGNOSIS — Z94.4 HISTORY OF LIVER TRANSPLANT (HCC): ICD-10-CM

## 2018-02-19 DIAGNOSIS — M25.531 RIGHT WRIST PAIN: ICD-10-CM

## 2018-02-19 DIAGNOSIS — N23 KIDNEY PAIN: ICD-10-CM

## 2018-02-19 DIAGNOSIS — M19.90 ARTHRITIS: ICD-10-CM

## 2018-02-19 DIAGNOSIS — I10 ESSENTIAL HYPERTENSION: ICD-10-CM

## 2018-02-19 DIAGNOSIS — M54.42 CHRONIC MIDLINE LOW BACK PAIN WITH BILATERAL SCIATICA: ICD-10-CM

## 2018-02-19 DIAGNOSIS — E11.8 TYPE 2 DIABETES MELLITUS WITH COMPLICATION, WITH LONG-TERM CURRENT USE OF INSULIN (HCC): ICD-10-CM

## 2018-02-19 DIAGNOSIS — Z79.4 TYPE 2 DIABETES MELLITUS WITH COMPLICATION, WITH LONG-TERM CURRENT USE OF INSULIN (HCC): ICD-10-CM

## 2018-02-19 DIAGNOSIS — Z09 HOSPITAL DISCHARGE FOLLOW-UP: Primary | ICD-10-CM

## 2018-02-19 DIAGNOSIS — G89.29 CHRONIC MIDLINE LOW BACK PAIN WITH BILATERAL SCIATICA: ICD-10-CM

## 2018-02-19 LAB
BILIRUBIN, POC: ABNORMAL
BLOOD URINE, POC: ABNORMAL
CLARITY, POC: CLEAR
COLOR, POC: YELLOW
GLUCOSE URINE, POC: ABNORMAL
KETONES, POC: ABNORMAL
LEUKOCYTE EST, POC: ABNORMAL
NITRITE, POC: ABNORMAL
PH, POC: 5.5
PROTEIN, POC: 100
SPECIFIC GRAVITY, POC: <1.005
UROBILINOGEN, POC: 0.2

## 2018-02-19 PROCEDURE — 81002 URINALYSIS NONAUTO W/O SCOPE: CPT | Performed by: FAMILY MEDICINE

## 2018-02-19 PROCEDURE — 99214 OFFICE O/P EST MOD 30 MIN: CPT | Performed by: FAMILY MEDICINE

## 2018-02-19 RX ORDER — ALBUTEROL SULFATE 90 UG/1
200 POWDER, METERED RESPIRATORY (INHALATION) 2 TIMES DAILY
Qty: 1 INHALER | Refills: 5 | Status: SHIPPED | OUTPATIENT
Start: 2018-02-19 | End: 2020-12-08 | Stop reason: SDUPTHER

## 2018-02-19 RX ORDER — TACROLIMUS 1 MG/1
1 CAPSULE ORAL 2 TIMES DAILY
Qty: 60 CAPSULE | Refills: 5 | Status: SHIPPED | OUTPATIENT
Start: 2018-02-19 | End: 2018-08-16 | Stop reason: SDUPTHER

## 2018-02-19 RX ORDER — LEVOFLOXACIN 750 MG/1
TABLET ORAL
COMMUNITY
Start: 2018-02-13 | End: 2018-03-20 | Stop reason: ALTCHOICE

## 2018-02-19 RX ORDER — OXYCODONE AND ACETAMINOPHEN 10; 325 MG/1; MG/1
1 TABLET ORAL EVERY 6 HOURS PRN
Qty: 90 TABLET | Refills: 0 | Status: SHIPPED | OUTPATIENT
Start: 2018-02-19 | End: 2018-05-15 | Stop reason: SDUPTHER

## 2018-02-19 RX ORDER — NITROGLYCERIN 0.4 MG/1
0.4 TABLET SUBLINGUAL EVERY 5 MIN PRN
Qty: 25 TABLET | Refills: 0 | Status: SHIPPED | OUTPATIENT
Start: 2018-02-19 | End: 2020-02-13

## 2018-02-19 RX ORDER — ALBUTEROL SULFATE 90 UG/1
POWDER, METERED RESPIRATORY (INHALATION)
COMMUNITY
Start: 2018-02-13 | End: 2018-02-19 | Stop reason: SDUPTHER

## 2018-02-19 RX ORDER — LEVOTHYROXINE SODIUM 75 MCG
75 TABLET ORAL DAILY
Qty: 30 TABLET | Refills: 5 | Status: SHIPPED | OUTPATIENT
Start: 2018-02-19 | End: 2018-08-16 | Stop reason: SDUPTHER

## 2018-02-19 RX ORDER — CLONAZEPAM 1 MG/1
1 TABLET ORAL 2 TIMES DAILY PRN
Qty: 60 TABLET | Refills: 2 | Status: SHIPPED | OUTPATIENT
Start: 2018-02-19 | End: 2018-05-15 | Stop reason: SDUPTHER

## 2018-02-19 ASSESSMENT — ENCOUNTER SYMPTOMS: BACK PAIN: 1

## 2018-02-19 NOTE — PROGRESS NOTES
Dispense:  30 tablet     Refill:  5    SYNTHROID 75 MCG tablet     Sig: Take 1 tablet by mouth daily Take with water on an empty stomach- wait 30 minutes before eating or taking other meds. Dispense:  30 tablet     Refill:  5    Cetirizine HCl 10 MG TBDP     Sig: Take 1 tablet by mouth daily     Dispense:  30 tablet     Refill:  5    clonazePAM (KLONOPIN) 1 MG tablet     Sig: Take 1 tablet by mouth 2 times daily as needed for Anxiety for up to 30 days. Dispense:  60 tablet     Refill:  2    insulin glargine (TOUJEO SOLOSTAR) 300 UNIT/ML injection pen     Sig: Inject 35 Units into the skin daily     Dispense:  3 pen     Refill:  5    tacrolimus (PROGRAF) 1 MG capsule     Sig: Take 1 capsule by mouth 2 times daily     Dispense:  60 capsule     Refill:  5    glucose blood VI test strips (ACCU-CHEK HONG PLUS) strip     Sig: TEST 3 TIMES DAILY     Dispense:  100 each     Refill:  5    nitroGLYCERIN (NITROSTAT) 0.4 MG SL tablet     Sig: Place 1 tablet under the tongue every 5 minutes as needed for Chest pain     Dispense:  25 tablet     Refill:  0    metFORMIN (GLUCOPHAGE) 1000 MG tablet     Sig: Take 1 tablet by mouth 2 times daily (with meals) This is a dosage increase     Dispense:  60 tablet     Refill:  5    oxyCODONE-acetaminophen (PERCOCET)  MG per tablet     Sig: Take 1 tablet by mouth every 6 hours as needed for Pain for up to 30 days.      Dispense:  90 tablet     Refill:  0        Medications Discontinued During This Encounter   Medication Reason    gabapentin (NEURONTIN) 770 MG capsule Duplicate Order    metoprolol tartrate (LOPRESSOR) 25 MG tablet Duplicate Order    vitamin B-12 (CYANOCOBALAMIN) 500 MCG tablet Patient Choice    PROAIR RESPICLICK 260 (90 Base) MCG/ACT aerosol powder inhalation Reorder    metoprolol tartrate (LOPRESSOR) 25 MG tablet Reorder    SYNTHROID 75 MCG tablet Reorder    Cetirizine HCl 10 MG TBDP Reorder    clonazePAM (KLONOPIN) 1 MG tablet Reorder   

## 2018-02-19 NOTE — PATIENT INSTRUCTIONS
We are committed to providing you with the best care possible. In order to help us achieve these goals please remember to bring all medications, herbal products, and over the counter supplements with you to each visit. If your provider has ordered testing for you, please be sure to follow up with our office if you have not received results within 7 days after the testing took place. *If you receive a survey after visiting one of our offices, please take time to share your experience concerning your physician office visit. These surveys are confidential and no health information about you is shared. We are eager to improve for you and we are counting on your feedback to help make that happen. · Keep a list of your medicines with you. List all of the prescription medicines, nonprescription medicines, supplements, natural remedies, and vitamins that you take. Tell your healthcare providers who treat you about all of the products you are taking. Your provider can provide you with a form to keep track of them. Just ask. · Follow the directions that come with your medicine, including information about food or alcohol. Make sure you know how and when to take your medicine. Do not take more or less than you are supposed to take. · Keep all medicines out of the reach of children. · Store medicines according to the directions on the label. · Monitor yourself. Learn to know how your body reacts to your new medicine and keep track of how it makes you feel before attempting (If your provider has allowed you to do so) to drive or go to work. · Seek emergency medical attention if you think you have used too much of this medicine. An overdose of any prescription medicine can be fatal. Overdose symptoms may include extreme drowsiness, muscle weakness, confusion, cold and clammy skin, pinpoint pupils, shallow breathing, slow heart rate, fainting, or coma.   · Don't share prescription medicines with others, even when they seem to have the same symptoms. What may be good for you may be harmful to others. · If you are no longer taking a prescribed medication and you have pills left please take your pills out of their original containers. Mix crushed pills with an undesirable substance, such as cat litter or used coffee grounds. Put the mixture into a disposable container with a lid, such as an empty margarine tub, or into a sealable bag. Cover up or remove any of your personal information on the empty containers by covering it with black permanent marker or duct tape. Place the sealed container with the mixture, and the empty drug containers, in the trash. · If you use a medication that is in the form of a patch, dispose of used patches by folding them in half so that the sticky sides meet, and then flushing them down a toilet. They should not be placed in the household trash where children or pets can find them. · If you have any questions, ask your provider or pharmacist for more information. · Be sure to keep all appointments for provider visits or tests. Ready to quit: Yes  Counseling given:  Yes

## 2018-02-20 DIAGNOSIS — I10 ESSENTIAL HYPERTENSION: ICD-10-CM

## 2018-02-20 DIAGNOSIS — E11.8 TYPE 2 DIABETES MELLITUS WITH COMPLICATION, WITH LONG-TERM CURRENT USE OF INSULIN (HCC): ICD-10-CM

## 2018-02-20 DIAGNOSIS — Z79.4 TYPE 2 DIABETES MELLITUS WITH COMPLICATION, WITH LONG-TERM CURRENT USE OF INSULIN (HCC): ICD-10-CM

## 2018-02-21 LAB
A/G RATIO: 1.5 (ref 0.8–2)
ALBUMIN SERPL-MCNC: 4 G/DL (ref 3.4–4.8)
ALP BLD-CCNC: 165 U/L (ref 25–100)
ALT SERPL-CCNC: 87 U/L (ref 4–36)
ANION GAP SERPL CALCULATED.3IONS-SCNC: 13 MMOL/L (ref 3–16)
AST SERPL-CCNC: 47 U/L (ref 8–33)
BASOPHILS ABSOLUTE: 0.2 K/UL (ref 0–0.1)
BASOPHILS RELATIVE PERCENT: 1.4 %
BILIRUB SERPL-MCNC: 0.6 MG/DL (ref 0.3–1.2)
BUN BLDV-MCNC: 35 MG/DL (ref 6–20)
CALCIUM SERPL-MCNC: 9.3 MG/DL (ref 8.5–10.5)
CHLORIDE BLD-SCNC: 95 MMOL/L (ref 98–107)
CHOLESTEROL, TOTAL: 139 MG/DL (ref 0–200)
CO2: 28 MMOL/L (ref 20–30)
CREAT SERPL-MCNC: 1.8 MG/DL (ref 0.4–1.2)
EOSINOPHILS ABSOLUTE: 0.2 K/UL (ref 0–0.4)
EOSINOPHILS RELATIVE PERCENT: 1.4 %
GAMMA GLUTAMYL TRANSFERASE: 636 U/L (ref 8–61)
GFR AFRICAN AMERICAN: 47
GFR NON-AFRICAN AMERICAN: 39
GLOBULIN: 2.7 G/DL
GLUCOSE BLD-MCNC: 151 MG/DL (ref 74–106)
HBA1C MFR BLD: 8.8 %
HCT VFR BLD CALC: 50.6 % (ref 40–54)
HDLC SERPL-MCNC: 48 MG/DL (ref 40–60)
HEMOGLOBIN: 16.1 G/DL (ref 13–18)
IMMATURE GRANULOCYTES #: 2.9 K/UL
IMMATURE GRANULOCYTES %: 18.3 % (ref 0–5)
LDL CHOLESTEROL CALCULATED: 45 MG/DL
LYMPHOCYTES ABSOLUTE: 3.8 K/UL (ref 1.5–4)
LYMPHOCYTES RELATIVE PERCENT: 24 %
MAGNESIUM: 2.1 MG/DL (ref 1.7–2.4)
MCH RBC QN AUTO: 28.4 PG (ref 27–32)
MCHC RBC AUTO-ENTMCNC: 31.8 G/DL (ref 31–35)
MCV RBC AUTO: 89.4 FL (ref 80–100)
MONOCYTES ABSOLUTE: 1.2 K/UL (ref 0.2–0.8)
MONOCYTES RELATIVE PERCENT: 7.8 %
NEUTROPHILS ABSOLUTE: 7.5 K/UL (ref 2–7.5)
NEUTROPHILS RELATIVE PERCENT: 47.1 %
PDW BLD-RTO: 14.8 % (ref 11–16)
PLATELET # BLD: 231 K/UL (ref 150–400)
PMV BLD AUTO: 11.3 FL (ref 6–10)
POTASSIUM SERPL-SCNC: 5.2 MMOL/L (ref 3.4–5.1)
RBC # BLD: 5.66 M/UL (ref 4.5–6)
SODIUM BLD-SCNC: 136 MMOL/L (ref 136–145)
TOTAL PROTEIN: 6.7 G/DL (ref 6.4–8.3)
TRIGL SERPL-MCNC: 232 MG/DL (ref 0–249)
VLDLC SERPL CALC-MCNC: 46 MG/DL
WBC # BLD: 16 K/UL (ref 4–11)

## 2018-03-20 ENCOUNTER — HOSPITAL ENCOUNTER (OUTPATIENT)
Dept: OTHER | Age: 58
Discharge: HOME | End: 2018-03-20
Attending: FAMILY MEDICINE | Admitting: FAMILY MEDICINE

## 2018-03-20 ENCOUNTER — OFFICE VISIT (OUTPATIENT)
Dept: FAMILY MEDICINE CLINIC | Age: 58
End: 2018-03-20
Payer: MEDICARE

## 2018-03-20 VITALS
OXYGEN SATURATION: 98 % | WEIGHT: 221 LBS | HEART RATE: 94 BPM | BODY MASS INDEX: 30.94 KG/M2 | RESPIRATION RATE: 18 BRPM | SYSTOLIC BLOOD PRESSURE: 132 MMHG | HEIGHT: 71 IN | DIASTOLIC BLOOD PRESSURE: 62 MMHG

## 2018-03-20 DIAGNOSIS — I10 ESSENTIAL HYPERTENSION: ICD-10-CM

## 2018-03-20 DIAGNOSIS — R53.83 FATIGUE, UNSPECIFIED TYPE: ICD-10-CM

## 2018-03-20 DIAGNOSIS — M54.41 CHRONIC MIDLINE LOW BACK PAIN WITH BILATERAL SCIATICA: ICD-10-CM

## 2018-03-20 DIAGNOSIS — M54.42 CHRONIC MIDLINE LOW BACK PAIN WITH BILATERAL SCIATICA: ICD-10-CM

## 2018-03-20 DIAGNOSIS — R53.83 FATIGUE, UNSPECIFIED TYPE: Primary | ICD-10-CM

## 2018-03-20 DIAGNOSIS — Z94.4 HISTORY OF LIVER TRANSPLANT (HCC): ICD-10-CM

## 2018-03-20 DIAGNOSIS — G89.29 CHRONIC MIDLINE LOW BACK PAIN WITH BILATERAL SCIATICA: ICD-10-CM

## 2018-03-20 DIAGNOSIS — Z12.11 SCREENING FOR COLON CANCER: ICD-10-CM

## 2018-03-20 DIAGNOSIS — M19.90 ARTHRITIS: ICD-10-CM

## 2018-03-20 DIAGNOSIS — Z79.4 TYPE 2 DIABETES MELLITUS WITH COMPLICATION, WITH LONG-TERM CURRENT USE OF INSULIN (HCC): ICD-10-CM

## 2018-03-20 DIAGNOSIS — E11.8 TYPE 2 DIABETES MELLITUS WITH COMPLICATION, WITH LONG-TERM CURRENT USE OF INSULIN (HCC): ICD-10-CM

## 2018-03-20 LAB
A/G RATIO: 1.3 (ref 0.8–2)
ALBUMIN SERPL-MCNC: 4 G/DL (ref 3.4–4.8)
ALP BLD-CCNC: 225 U/L (ref 25–100)
ALT SERPL-CCNC: 29 U/L (ref 4–36)
ANION GAP SERPL CALCULATED.3IONS-SCNC: 14 MMOL/L (ref 3–16)
AST SERPL-CCNC: 25 U/L (ref 8–33)
BILIRUB SERPL-MCNC: 0.6 MG/DL (ref 0.3–1.2)
BUN BLDV-MCNC: 18 MG/DL (ref 6–20)
CALCIUM SERPL-MCNC: 9.4 MG/DL (ref 8.5–10.5)
CHLORIDE BLD-SCNC: 97 MMOL/L (ref 98–107)
CO2: 28 MMOL/L (ref 20–30)
CREAT SERPL-MCNC: 1.4 MG/DL (ref 0.4–1.2)
GFR AFRICAN AMERICAN: >59
GFR NON-AFRICAN AMERICAN: 52
GLOBULIN: 3.2 G/DL
GLUCOSE BLD-MCNC: 216 MG/DL (ref 74–106)
POTASSIUM SERPL-SCNC: 5 MMOL/L (ref 3.4–5.1)
SODIUM BLD-SCNC: 139 MMOL/L (ref 136–145)
TOTAL PROTEIN: 7.2 G/DL (ref 6.4–8.3)
TSH SERPL DL<=0.05 MIU/L-ACNC: 5.69 UIU/ML (ref 0.35–5.5)

## 2018-03-20 PROCEDURE — 99214 OFFICE O/P EST MOD 30 MIN: CPT | Performed by: FAMILY MEDICINE

## 2018-03-20 RX ORDER — PRAVASTATIN SODIUM 40 MG
TABLET ORAL
Qty: 30 TABLET | Refills: 5 | Status: SHIPPED | OUTPATIENT
Start: 2018-03-20 | End: 2018-08-10 | Stop reason: SDUPTHER

## 2018-03-20 ASSESSMENT — ENCOUNTER SYMPTOMS: BACK PAIN: 1

## 2018-03-20 NOTE — PROGRESS NOTES
Value Date    WBC 16.0 02/19/2018    NEUTROABS 7.5 02/19/2018    HGB 16.1 02/19/2018    HCT 50.6 02/19/2018    MCV 89.4 02/19/2018     02/19/2018       Lab Results   Component Value Date    TSH 5.69 (H) 03/20/2018         ASSESSMENT:   1. Fatigue, unspecified type  COMPREHENSIVE METABOLIC PANEL    TSH without Reflex   2. Screening for colon cancer  POCT Fecal Immunochemical Test (FIT)   3. Type 2 diabetes mellitus with complication, with long-term current use of insulin ([facility]     4. Essential hypertension     5. Chronic midline low back pain with bilateral sciatica     6. Arthritis     7.  History of liver transplant ([facility]          PLAN:  Orders Placed This Encounter   Medications    pravastatin (PRAVACHOL) 40 MG tablet     Sig: TAKE ONE TABLET BY MOUTH DAILY     Dispense:  30 tablet     Refill:  5        Medications Discontinued During This Encounter   Medication Reason    BREO ELLIPTA 200-25 MCG/INH AEPB Patient Choice    levofloxacin (LEVAQUIN) 750 MG tablet Therapy completed    pravastatin (PRAVACHOL) 40 MG tablet Reorder       Controlled Substances Monitoring:

## 2018-05-15 ENCOUNTER — HOSPITAL ENCOUNTER (OUTPATIENT)
Dept: OTHER | Age: 58
Discharge: HOME | End: 2018-05-15
Attending: FAMILY MEDICINE | Admitting: FAMILY MEDICINE

## 2018-05-15 ENCOUNTER — OFFICE VISIT (OUTPATIENT)
Dept: FAMILY MEDICINE CLINIC | Age: 58
End: 2018-05-15
Payer: MEDICARE

## 2018-05-15 VITALS
SYSTOLIC BLOOD PRESSURE: 122 MMHG | BODY MASS INDEX: 30.9 KG/M2 | HEART RATE: 78 BPM | RESPIRATION RATE: 20 BRPM | WEIGHT: 220.7 LBS | OXYGEN SATURATION: 98 % | HEIGHT: 71 IN | DIASTOLIC BLOOD PRESSURE: 72 MMHG

## 2018-05-15 DIAGNOSIS — G89.29 CHRONIC MIDLINE LOW BACK PAIN WITH BILATERAL SCIATICA: ICD-10-CM

## 2018-05-15 DIAGNOSIS — M54.41 CHRONIC MIDLINE LOW BACK PAIN WITH BILATERAL SCIATICA: ICD-10-CM

## 2018-05-15 DIAGNOSIS — Z79.4 TYPE 2 DIABETES MELLITUS WITH COMPLICATION, WITH LONG-TERM CURRENT USE OF INSULIN (HCC): Primary | ICD-10-CM

## 2018-05-15 DIAGNOSIS — Z12.11 SCREENING FOR COLON CANCER: ICD-10-CM

## 2018-05-15 DIAGNOSIS — R53.83 FATIGUE, UNSPECIFIED TYPE: ICD-10-CM

## 2018-05-15 DIAGNOSIS — Z79.4 TYPE 2 DIABETES MELLITUS WITH COMPLICATION, WITH LONG-TERM CURRENT USE OF INSULIN (HCC): ICD-10-CM

## 2018-05-15 DIAGNOSIS — I10 ESSENTIAL HYPERTENSION: ICD-10-CM

## 2018-05-15 DIAGNOSIS — E11.8 TYPE 2 DIABETES MELLITUS WITH COMPLICATION, WITH LONG-TERM CURRENT USE OF INSULIN (HCC): Primary | ICD-10-CM

## 2018-05-15 DIAGNOSIS — E11.8 TYPE 2 DIABETES MELLITUS WITH COMPLICATION, WITH LONG-TERM CURRENT USE OF INSULIN (HCC): ICD-10-CM

## 2018-05-15 DIAGNOSIS — M54.42 CHRONIC MIDLINE LOW BACK PAIN WITH BILATERAL SCIATICA: ICD-10-CM

## 2018-05-15 PROCEDURE — 90732 PPSV23 VACC 2 YRS+ SUBQ/IM: CPT | Performed by: FAMILY MEDICINE

## 2018-05-15 PROCEDURE — G0009 ADMIN PNEUMOCOCCAL VACCINE: HCPCS | Performed by: FAMILY MEDICINE

## 2018-05-15 PROCEDURE — 99214 OFFICE O/P EST MOD 30 MIN: CPT | Performed by: FAMILY MEDICINE

## 2018-05-15 RX ORDER — CLONAZEPAM 1 MG/1
1 TABLET ORAL 2 TIMES DAILY PRN
Qty: 60 TABLET | Refills: 2 | Status: SHIPPED | OUTPATIENT
Start: 2018-05-15 | End: 2018-08-16 | Stop reason: SDUPTHER

## 2018-05-15 RX ORDER — GABAPENTIN 300 MG/1
CAPSULE ORAL
Qty: 90 CAPSULE | Refills: 2 | Status: SHIPPED | OUTPATIENT
Start: 2018-05-15 | End: 2018-08-16 | Stop reason: SDUPTHER

## 2018-05-15 RX ORDER — TRAZODONE HYDROCHLORIDE 150 MG/1
TABLET ORAL
Qty: 30 TABLET | Refills: 5 | Status: SHIPPED | OUTPATIENT
Start: 2018-05-15 | End: 2018-11-15 | Stop reason: SDUPTHER

## 2018-05-15 RX ORDER — OXYCODONE AND ACETAMINOPHEN 10; 325 MG/1; MG/1
1 TABLET ORAL EVERY 6 HOURS PRN
Qty: 90 TABLET | Refills: 0 | Status: SHIPPED | OUTPATIENT
Start: 2018-05-15 | End: 2018-08-16 | Stop reason: SDUPTHER

## 2018-05-16 LAB
A/G RATIO: 1.2 (ref 0.8–2)
ALBUMIN SERPL-MCNC: 3.8 G/DL (ref 3.4–4.8)
ALP BLD-CCNC: 204 U/L (ref 25–100)
ALT SERPL-CCNC: 32 U/L (ref 4–36)
ANION GAP SERPL CALCULATED.3IONS-SCNC: 10 MMOL/L (ref 3–16)
AST SERPL-CCNC: 30 U/L (ref 8–33)
BILIRUB SERPL-MCNC: 0.8 MG/DL (ref 0.3–1.2)
BUN BLDV-MCNC: 25 MG/DL (ref 6–20)
CALCIUM SERPL-MCNC: 9.2 MG/DL (ref 8.5–10.5)
CHLORIDE BLD-SCNC: 97 MMOL/L (ref 98–107)
CO2: 29 MMOL/L (ref 20–30)
CREAT SERPL-MCNC: 1.4 MG/DL (ref 0.4–1.2)
GAMMA GLUTAMYL TRANSFERASE: 248 U/L (ref 8–61)
GFR AFRICAN AMERICAN: >59
GFR NON-AFRICAN AMERICAN: 52
GLOBULIN: 3.3 G/DL
GLUCOSE BLD-MCNC: 204 MG/DL (ref 74–106)
HBA1C MFR BLD: 8.2 %
HCT VFR BLD CALC: 46.5 % (ref 40–54)
HEMOGLOBIN: 15.1 G/DL (ref 13–18)
MAGNESIUM: 1.9 MG/DL (ref 1.7–2.4)
MCH RBC QN AUTO: 29.3 PG (ref 27–32)
MCHC RBC AUTO-ENTMCNC: 32.5 G/DL (ref 31–35)
MCV RBC AUTO: 90.3 FL (ref 80–100)
PDW BLD-RTO: 15.9 % (ref 11–16)
PLATELET # BLD: 195 K/UL (ref 150–400)
PMV BLD AUTO: 10.4 FL (ref 6–10)
POTASSIUM SERPL-SCNC: 5.1 MMOL/L (ref 3.4–5.1)
RBC # BLD: 5.15 M/UL (ref 4.5–6)
SODIUM BLD-SCNC: 136 MMOL/L (ref 136–145)
TOTAL PROTEIN: 7.1 G/DL (ref 6.4–8.3)
TSH SERPL DL<=0.05 MIU/L-ACNC: 5.49 UIU/ML (ref 0.35–5.5)
WBC # BLD: 11.8 K/UL (ref 4–11)

## 2018-06-03 ASSESSMENT — ENCOUNTER SYMPTOMS: BACK PAIN: 1

## 2018-07-16 ENCOUNTER — OFFICE VISIT (OUTPATIENT)
Dept: FAMILY MEDICINE CLINIC | Age: 58
End: 2018-07-16
Payer: MEDICARE

## 2018-07-16 VITALS
BODY MASS INDEX: 31.12 KG/M2 | SYSTOLIC BLOOD PRESSURE: 136 MMHG | RESPIRATION RATE: 18 BRPM | WEIGHT: 222.3 LBS | HEART RATE: 83 BPM | HEIGHT: 71 IN | DIASTOLIC BLOOD PRESSURE: 76 MMHG | OXYGEN SATURATION: 98 %

## 2018-07-16 DIAGNOSIS — M79.89 LEG SWELLING: ICD-10-CM

## 2018-07-16 DIAGNOSIS — L55.9 SUNBURN: Primary | ICD-10-CM

## 2018-07-16 DIAGNOSIS — T24.202A PARTIAL THICKNESS BURN OF LEFT LOWER EXTREMITY, INITIAL ENCOUNTER: ICD-10-CM

## 2018-07-16 DIAGNOSIS — E11.8 TYPE 2 DIABETES MELLITUS WITH COMPLICATION, WITH LONG-TERM CURRENT USE OF INSULIN (HCC): ICD-10-CM

## 2018-07-16 DIAGNOSIS — M79.605 LEFT LEG PAIN: ICD-10-CM

## 2018-07-16 DIAGNOSIS — Z79.4 TYPE 2 DIABETES MELLITUS WITH COMPLICATION, WITH LONG-TERM CURRENT USE OF INSULIN (HCC): ICD-10-CM

## 2018-07-16 PROCEDURE — 99214 OFFICE O/P EST MOD 30 MIN: CPT | Performed by: NURSE PRACTITIONER

## 2018-07-16 PROCEDURE — 29580 STRAPPING UNNA BOOT: CPT | Performed by: NURSE PRACTITIONER

## 2018-07-16 ASSESSMENT — PATIENT HEALTH QUESTIONNAIRE - PHQ9
SUM OF ALL RESPONSES TO PHQ9 QUESTIONS 1 & 2: 0
SUM OF ALL RESPONSES TO PHQ QUESTIONS 1-9: 0
1. LITTLE INTEREST OR PLEASURE IN DOING THINGS: 0
2. FEELING DOWN, DEPRESSED OR HOPELESS: 0

## 2018-07-23 ENCOUNTER — OFFICE VISIT (OUTPATIENT)
Dept: FAMILY MEDICINE CLINIC | Age: 58
End: 2018-07-23
Payer: MEDICARE

## 2018-07-23 VITALS
SYSTOLIC BLOOD PRESSURE: 122 MMHG | DIASTOLIC BLOOD PRESSURE: 72 MMHG | WEIGHT: 221 LBS | RESPIRATION RATE: 20 BRPM | OXYGEN SATURATION: 97 % | BODY MASS INDEX: 30.94 KG/M2 | HEIGHT: 71 IN

## 2018-07-23 DIAGNOSIS — L55.1 SUNBURN OF SECOND DEGREE: Primary | ICD-10-CM

## 2018-07-23 PROCEDURE — 99213 OFFICE O/P EST LOW 20 MIN: CPT | Performed by: FAMILY MEDICINE

## 2018-07-23 NOTE — PROGRESS NOTES
Per Marcie Hickman MD order, Moishe Oppenheim applied to left Lower Extremity. Secured with ace bandage. Patient tolerated procedure well.

## 2018-07-30 ENCOUNTER — NURSE ONLY (OUTPATIENT)
Dept: FAMILY MEDICINE CLINIC | Age: 58
End: 2018-07-30

## 2018-07-30 DIAGNOSIS — Z48.00 DRESSING CHANGE: Primary | ICD-10-CM

## 2018-08-05 ASSESSMENT — ENCOUNTER SYMPTOMS: BACK PAIN: 1

## 2018-08-06 ENCOUNTER — NURSE ONLY (OUTPATIENT)
Dept: FAMILY MEDICINE CLINIC | Age: 58
End: 2018-08-06

## 2018-08-10 RX ORDER — PRAVASTATIN SODIUM 40 MG
TABLET ORAL
Qty: 90 TABLET | Refills: 3 | Status: SHIPPED | OUTPATIENT
Start: 2018-08-10 | End: 2019-05-14 | Stop reason: SDUPTHER

## 2018-08-16 ENCOUNTER — HOSPITAL ENCOUNTER (OUTPATIENT)
Facility: HOSPITAL | Age: 58
Discharge: HOME | End: 2018-08-16
Payer: MEDICARE

## 2018-08-16 ENCOUNTER — OFFICE VISIT (OUTPATIENT)
Dept: FAMILY MEDICINE CLINIC | Age: 58
End: 2018-08-16
Payer: MEDICARE

## 2018-08-16 VITALS
DIASTOLIC BLOOD PRESSURE: 80 MMHG | OXYGEN SATURATION: 98 % | HEART RATE: 78 BPM | BODY MASS INDEX: 30.49 KG/M2 | HEIGHT: 71 IN | SYSTOLIC BLOOD PRESSURE: 126 MMHG | WEIGHT: 217.8 LBS

## 2018-08-16 DIAGNOSIS — Z79.4 TYPE 2 DIABETES MELLITUS WITH COMPLICATION, WITH LONG-TERM CURRENT USE OF INSULIN (HCC): Primary | ICD-10-CM

## 2018-08-16 DIAGNOSIS — E11.8 TYPE 2 DIABETES MELLITUS WITH COMPLICATION, WITH LONG-TERM CURRENT USE OF INSULIN (HCC): Primary | ICD-10-CM

## 2018-08-16 DIAGNOSIS — Z94.4 HISTORY OF LIVER TRANSPLANT (HCC): ICD-10-CM

## 2018-08-16 DIAGNOSIS — R53.83 FATIGUE, UNSPECIFIED TYPE: ICD-10-CM

## 2018-08-16 DIAGNOSIS — Z79.4 TYPE 2 DIABETES MELLITUS WITH COMPLICATION, WITH LONG-TERM CURRENT USE OF INSULIN (HCC): ICD-10-CM

## 2018-08-16 DIAGNOSIS — I10 ESSENTIAL HYPERTENSION: ICD-10-CM

## 2018-08-16 DIAGNOSIS — M19.90 ARTHRITIS: ICD-10-CM

## 2018-08-16 DIAGNOSIS — E03.9 HYPOTHYROIDISM, UNSPECIFIED TYPE: ICD-10-CM

## 2018-08-16 DIAGNOSIS — F41.9 ANXIETY: ICD-10-CM

## 2018-08-16 DIAGNOSIS — L98.9 SKIN LESION OF LEFT LEG: ICD-10-CM

## 2018-08-16 DIAGNOSIS — E11.8 TYPE 2 DIABETES MELLITUS WITH COMPLICATION, WITH LONG-TERM CURRENT USE OF INSULIN (HCC): ICD-10-CM

## 2018-08-16 PROCEDURE — 82977 ASSAY OF GGT: CPT

## 2018-08-16 PROCEDURE — 99214 OFFICE O/P EST MOD 30 MIN: CPT | Performed by: FAMILY MEDICINE

## 2018-08-16 PROCEDURE — 83735 ASSAY OF MAGNESIUM: CPT

## 2018-08-16 PROCEDURE — 36415 COLL VENOUS BLD VENIPUNCTURE: CPT

## 2018-08-16 PROCEDURE — 84443 ASSAY THYROID STIM HORMONE: CPT

## 2018-08-16 PROCEDURE — 84439 ASSAY OF FREE THYROXINE: CPT

## 2018-08-16 PROCEDURE — 83036 HEMOGLOBIN GLYCOSYLATED A1C: CPT

## 2018-08-16 PROCEDURE — 85027 COMPLETE CBC AUTOMATED: CPT

## 2018-08-16 PROCEDURE — 80053 COMPREHEN METABOLIC PANEL: CPT

## 2018-08-16 RX ORDER — OXYCODONE AND ACETAMINOPHEN 10; 325 MG/1; MG/1
1 TABLET ORAL EVERY 6 HOURS PRN
Qty: 90 TABLET | Refills: 0 | Status: SHIPPED | OUTPATIENT
Start: 2018-08-16 | End: 2018-11-15 | Stop reason: SDUPTHER

## 2018-08-16 RX ORDER — LEVOTHYROXINE SODIUM 75 MCG
75 TABLET ORAL DAILY
Qty: 30 TABLET | Refills: 5 | Status: SHIPPED | OUTPATIENT
Start: 2018-08-16 | End: 2018-08-28

## 2018-08-16 RX ORDER — GABAPENTIN 300 MG/1
CAPSULE ORAL
Qty: 90 CAPSULE | Refills: 2 | Status: SHIPPED | OUTPATIENT
Start: 2018-08-16 | End: 2018-11-15 | Stop reason: SDUPTHER

## 2018-08-16 RX ORDER — CLONAZEPAM 1 MG/1
1 TABLET ORAL 2 TIMES DAILY PRN
Qty: 60 TABLET | Refills: 2 | Status: SHIPPED | OUTPATIENT
Start: 2018-08-16 | End: 2020-09-08 | Stop reason: SDUPTHER

## 2018-08-16 RX ORDER — TACROLIMUS 1 MG/1
1 CAPSULE ORAL 2 TIMES DAILY
Qty: 60 CAPSULE | Refills: 5 | Status: SHIPPED | OUTPATIENT
Start: 2018-08-16 | End: 2018-11-15 | Stop reason: SDUPTHER

## 2018-08-16 ASSESSMENT — ENCOUNTER SYMPTOMS: BACK PAIN: 1

## 2018-08-16 NOTE — PROGRESS NOTES
SUBJECTIVE:    Patient ID: Pam Anderson is a 63 y.o. male. Chief Complaint   Patient presents with    Diabetes     f/u     Sunburn     f/u        HPI   he's here in follow-up of his diabetes. He is not doing as well as his diet. He struggling to have his blood sugars be relatively normal. He is not checking them as often as she should. He says he is definitely not taken his medication as well as he should. He's not been taking his thyroid medicine for several weeks. He says he just forgets to take it. He's not had any chest pain. She denies any shortness of breath. The sunburn on his leg is healed. Review of Systems   Musculoskeletal: Positive for back pain and gait problem. All other systems reviewed and are negative. OBJECTIVE:  Wt Readings from Last 3 Encounters:   08/16/18 217 lb 12.8 oz (98.8 kg)   07/23/18 221 lb (100.2 kg)   07/16/18 222 lb 4.8 oz (100.8 kg)     BP Readings from Last 3 Encounters:   08/16/18 126/80   07/23/18 122/72   07/16/18 136/76      Pulse Readings from Last 3 Encounters:   08/16/18 78   07/16/18 83   05/15/18 78     Body mass index is 30.38 kg/m². Resp Readings from Last 3 Encounters:   07/23/18 20   07/16/18 18   05/15/18 20     Physical Exam   Constitutional: He is oriented to person, place, and time. Vital signs are normal. He appears well-developed and well-nourished. HENT:   Head: Normocephalic and atraumatic. Right Ear: Hearing and external ear normal.   Left Ear: Hearing and external ear normal.   Nose: Nose normal.   Mouth/Throat: Uvula is midline, oropharynx is clear and moist and mucous membranes are normal.   Eyes: Pupils are equal, round, and reactive to light. Conjunctivae, EOM and lids are normal.   Neck: Trachea normal, normal range of motion and phonation normal. Neck supple. Carotid bruit is not present. No thyroid mass and no thyromegaly present.    Cardiovascular: Normal rate, regular rhythm, S1 normal, S2 normal, normal heart sounds, intact distal pulses and normal pulses. Exam reveals no gallop and no friction rub. No murmur heard. Pulmonary/Chest: Effort normal and breath sounds normal.   Musculoskeletal:        Lumbar back: He exhibits decreased range of motion, tenderness, pain and spasm. Neurological: He is alert and oriented to person, place, and time. Skin: Skin is warm and dry. Psychiatric: His mood appears anxious. Nursing note and vitals reviewed.       Results in Past 30 Days  Result Component Current Result Ref Range Previous Result Ref Range   Alb 4.2 (8/16/2018) 3.4 - 4.8 g/dL Not in Time Range    Albumin/Globulin Ratio 1.4 (8/16/2018) 0.8 - 2.0 Not in Time Range    Alkaline Phosphatase 232 (H) (8/16/2018) 25 - 100 U/L Not in Time Range    ALT 37 (H) (8/16/2018) 4 - 36 U/L Not in Time Range    AST 40 (H) (8/16/2018) 8 - 33 U/L Not in Time Range    BUN 23 (H) (8/16/2018) 6 - 20 mg/dL Not in Time Range    Calcium 9.8 (8/16/2018) 8.5 - 10.5 mg/dL Not in Time Range    Chloride 101 (8/16/2018) 98 - 107 mmol/L Not in Time Range    CO2 28 (8/16/2018) 20 - 30 mmol/L Not in Time Range    CREATININE 1.5 (H) (8/16/2018) 0.4 - 1.2 mg/dL Not in Time Range    GFR  58 (L) (8/16/2018) >59 Not in Time Range    GFR Non- 48 (L) (8/16/2018) >59 Not in Time Range    Globulin 3.1 (8/16/2018) g/dL Not in Time Range    Glucose 170 (H) (8/16/2018) 74 - 106 mg/dL Not in Time Range    Potassium 5.6 (H) (8/16/2018) 3.4 - 5.1 mmol/L Not in Time Range    Sodium 139 (8/16/2018) 136 - 145 mmol/L Not in Time Range    Total Bilirubin 0.4 (8/16/2018) 0.3 - 1.2 mg/dL Not in Time Range    Total Protein 7.3 (8/16/2018) 6.4 - 8.3 g/dL Not in Time Range        Hemoglobin A1C (%)   Date Value   08/16/2018 8.5 (H)     Microalbumin, Random Urine (mg/dL)   Date Value   08/28/2017 359.70 (H)     LDL Calculated (mg/dL)   Date Value   02/19/2018 45         Lab Results   Component Value Date    WBC 8.9 08/16/2018    NEUTROABS 7.5 02/19/2018 no

## 2018-08-17 LAB
A/G RATIO: 1.4 (ref 0.8–2)
ALBUMIN SERPL-MCNC: 4.2 G/DL (ref 3.4–4.8)
ALP BLD-CCNC: 232 U/L (ref 25–100)
ALT SERPL-CCNC: 37 U/L (ref 4–36)
ANION GAP SERPL CALCULATED.3IONS-SCNC: 10 MMOL/L (ref 3–16)
AST SERPL-CCNC: 40 U/L (ref 8–33)
BILIRUB SERPL-MCNC: 0.4 MG/DL (ref 0.3–1.2)
BUN BLDV-MCNC: 23 MG/DL (ref 6–20)
CALCIUM SERPL-MCNC: 9.8 MG/DL (ref 8.5–10.5)
CHLORIDE BLD-SCNC: 101 MMOL/L (ref 98–107)
CO2: 28 MMOL/L (ref 20–30)
CREAT SERPL-MCNC: 1.5 MG/DL (ref 0.4–1.2)
GAMMA GLUTAMYL TRANSFERASE: 238 U/L (ref 8–61)
GFR AFRICAN AMERICAN: 58
GFR NON-AFRICAN AMERICAN: 48
GLOBULIN: 3.1 G/DL
GLUCOSE BLD-MCNC: 170 MG/DL (ref 74–106)
HBA1C MFR BLD: 8.5 %
HCT VFR BLD CALC: 46.9 % (ref 40–54)
HEMOGLOBIN: 15 G/DL (ref 13–18)
MAGNESIUM: 2.1 MG/DL (ref 1.7–2.4)
MCH RBC QN AUTO: 28.3 PG (ref 27–32)
MCHC RBC AUTO-ENTMCNC: 32 G/DL (ref 31–35)
MCV RBC AUTO: 88.5 FL (ref 80–100)
PDW BLD-RTO: 15.4 % (ref 11–16)
PLATELET # BLD: 189 K/UL (ref 150–400)
PMV BLD AUTO: 10.5 FL (ref 6–10)
POTASSIUM SERPL-SCNC: 5.6 MMOL/L (ref 3.4–5.1)
RBC # BLD: 5.3 M/UL (ref 4.5–6)
SODIUM BLD-SCNC: 139 MMOL/L (ref 136–145)
T4 FREE: 1 NG/DL (ref 0.89–1.76)
TOTAL PROTEIN: 7.3 G/DL (ref 6.4–8.3)
TSH SERPL DL<=0.05 MIU/L-ACNC: 8.77 UIU/ML (ref 0.35–5.5)
WBC # BLD: 8.9 K/UL (ref 4–11)

## 2018-08-19 ASSESSMENT — ENCOUNTER SYMPTOMS: BACK PAIN: 1

## 2018-08-19 NOTE — PROGRESS NOTES
present. Cardiovascular: Normal rate, regular rhythm, S1 normal, S2 normal, normal heart sounds, intact distal pulses and normal pulses. Exam reveals no gallop and no friction rub. No murmur heard. Pulmonary/Chest: Effort normal and breath sounds normal.   Musculoskeletal:        Lumbar back: He exhibits decreased range of motion, tenderness, pain and spasm. Neurological: He is alert and oriented to person, place, and time. Skin: Skin is warm and dry. Burn noted. Left lower extremity partial-thickness burn with redness, blistering, and swelling. Psychiatric: His mood appears anxious. Nursing note and vitals reviewed.       Results in Past 30 Days  Result Component Current Result Ref Range Previous Result Ref Range   Alb 4.2 (8/16/2018) 3.4 - 4.8 g/dL Not in Time Range    Albumin/Globulin Ratio 1.4 (8/16/2018) 0.8 - 2.0 Not in Time Range    Alkaline Phosphatase 232 (H) (8/16/2018) 25 - 100 U/L Not in Time Range    ALT 37 (H) (8/16/2018) 4 - 36 U/L Not in Time Range    AST 40 (H) (8/16/2018) 8 - 33 U/L Not in Time Range    BUN 23 (H) (8/16/2018) 6 - 20 mg/dL Not in Time Range    Calcium 9.8 (8/16/2018) 8.5 - 10.5 mg/dL Not in Time Range    Chloride 101 (8/16/2018) 98 - 107 mmol/L Not in Time Range    CO2 28 (8/16/2018) 20 - 30 mmol/L Not in Time Range    CREATININE 1.5 (H) (8/16/2018) 0.4 - 1.2 mg/dL Not in Time Range    GFR  58 (L) (8/16/2018) >59 Not in Time Range    GFR Non- 48 (L) (8/16/2018) >59 Not in Time Range    Globulin 3.1 (8/16/2018) g/dL Not in Time Range    Glucose 170 (H) (8/16/2018) 74 - 106 mg/dL Not in Time Range    Potassium 5.6 (H) (8/16/2018) 3.4 - 5.1 mmol/L Not in Time Range    Sodium 139 (8/16/2018) 136 - 145 mmol/L Not in Time Range    Total Bilirubin 0.4 (8/16/2018) 0.3 - 1.2 mg/dL Not in Time Range    Total Protein 7.3 (8/16/2018) 6.4 - 8.3 g/dL Not in Time Range      Hemoglobin A1C (%)   Date Value   08/16/2018 8.5 (H)     Microalbumin, Random Urine (mg/dL)   Date Value   08/28/2017 359.70 (H)     LDL Calculated (mg/dL)   Date Value   02/19/2018 45       Lab Results   Component Value Date    WBC 8.9 08/16/2018    NEUTROABS 7.5 02/19/2018    HGB 15.0 08/16/2018    HCT 46.9 08/16/2018    MCV 88.5 08/16/2018     08/16/2018     Lab Results   Component Value Date    TSH 8.77 (H) 08/16/2018         ASSESSMENT/PLAN:  Kitty Sahni was seen today for sunburn and wound check. Diagnoses and all orders for this visit:    Sunburn  -     SD APPLY OF PASTE BOOT    Left leg pain    Partial thickness burn of left lower extremity, initial encounter  -     SD APPLY OF PASTE BOOT    Leg swelling    Type 2 diabetes mellitus with complication, with long-term current use of insulin (HCC)      UNNA boot was placed on left leg and covered with ACE wrap. He tolerated procedure well and will return in one week to have wrap removed and replaced if needed. There are no discontinued medications. Return in about 1 week (around 7/23/2018), or if symptoms worsen or fail to improve. PATIENT COUNSELING    Counseling was provided today regarding the following topics: Healthy eating habits, Regular exercise, substance abuse and healthy sleep habits. Discussed use, benefit, and side effects of prescribed medications. Barriers to medication compliance addressed. All patient questions answered. Pt voiced understanding.      Controlled Substances Monitoring:

## 2018-08-28 RX ORDER — LEVOTHYROXINE SODIUM 88 MCG
88 TABLET ORAL DAILY
Qty: 30 TABLET | Refills: 2 | Status: SHIPPED | OUTPATIENT
Start: 2018-08-28 | End: 2018-11-15 | Stop reason: SDUPTHER

## 2018-11-15 ENCOUNTER — OFFICE VISIT (OUTPATIENT)
Dept: FAMILY MEDICINE CLINIC | Age: 58
End: 2018-11-15
Payer: MEDICARE

## 2018-11-15 ENCOUNTER — HOSPITAL ENCOUNTER (OUTPATIENT)
Facility: HOSPITAL | Age: 58
Discharge: HOME | End: 2018-11-15
Payer: MEDICARE

## 2018-11-15 VITALS
OXYGEN SATURATION: 96 % | HEART RATE: 74 BPM | HEIGHT: 71 IN | RESPIRATION RATE: 20 BRPM | DIASTOLIC BLOOD PRESSURE: 74 MMHG | SYSTOLIC BLOOD PRESSURE: 124 MMHG | BODY MASS INDEX: 31.11 KG/M2 | WEIGHT: 222.2 LBS

## 2018-11-15 DIAGNOSIS — E03.9 HYPOTHYROIDISM, UNSPECIFIED TYPE: ICD-10-CM

## 2018-11-15 DIAGNOSIS — M19.90 ARTHRITIS: ICD-10-CM

## 2018-11-15 DIAGNOSIS — I10 ESSENTIAL HYPERTENSION: ICD-10-CM

## 2018-11-15 DIAGNOSIS — R25.2 LEG CRAMPS: ICD-10-CM

## 2018-11-15 DIAGNOSIS — M54.6 THORACIC SPINE PAIN: ICD-10-CM

## 2018-11-15 DIAGNOSIS — Z79.4 TYPE 2 DIABETES MELLITUS WITH COMPLICATION, WITH LONG-TERM CURRENT USE OF INSULIN (HCC): ICD-10-CM

## 2018-11-15 DIAGNOSIS — E11.9 ENCOUNTER FOR DIABETIC FOOT EXAM (HCC): ICD-10-CM

## 2018-11-15 DIAGNOSIS — G47.00 INSOMNIA, UNSPECIFIED TYPE: ICD-10-CM

## 2018-11-15 DIAGNOSIS — J20.9 ACUTE BRONCHITIS, UNSPECIFIED ORGANISM: Primary | ICD-10-CM

## 2018-11-15 DIAGNOSIS — E11.8 TYPE 2 DIABETES MELLITUS WITH COMPLICATION, WITH LONG-TERM CURRENT USE OF INSULIN (HCC): ICD-10-CM

## 2018-11-15 DIAGNOSIS — Z94.4 HISTORY OF LIVER TRANSPLANT (HCC): ICD-10-CM

## 2018-11-15 PROCEDURE — 82570 ASSAY OF URINE CREATININE: CPT

## 2018-11-15 PROCEDURE — 82330 ASSAY OF CALCIUM: CPT

## 2018-11-15 PROCEDURE — 99214 OFFICE O/P EST MOD 30 MIN: CPT | Performed by: FAMILY MEDICINE

## 2018-11-15 PROCEDURE — 82043 UR ALBUMIN QUANTITATIVE: CPT

## 2018-11-15 PROCEDURE — 83735 ASSAY OF MAGNESIUM: CPT

## 2018-11-15 PROCEDURE — 83036 HEMOGLOBIN GLYCOSYLATED A1C: CPT

## 2018-11-15 PROCEDURE — 36415 COLL VENOUS BLD VENIPUNCTURE: CPT

## 2018-11-15 PROCEDURE — 80053 COMPREHEN METABOLIC PANEL: CPT

## 2018-11-15 PROCEDURE — 84439 ASSAY OF FREE THYROXINE: CPT

## 2018-11-15 PROCEDURE — 84443 ASSAY THYROID STIM HORMONE: CPT

## 2018-11-15 RX ORDER — AZITHROMYCIN 250 MG/1
250 TABLET, FILM COATED ORAL SEE ADMIN INSTRUCTIONS
Qty: 6 TABLET | Refills: 0 | Status: SHIPPED | OUTPATIENT
Start: 2018-11-15 | End: 2018-11-20

## 2018-11-15 RX ORDER — TACROLIMUS 1 MG/1
1 CAPSULE ORAL 2 TIMES DAILY
Qty: 180 CAPSULE | Refills: 1 | Status: SHIPPED | OUTPATIENT
Start: 2018-11-15 | End: 2019-09-03 | Stop reason: SDUPTHER

## 2018-11-15 RX ORDER — OXYCODONE AND ACETAMINOPHEN 10; 325 MG/1; MG/1
1 TABLET ORAL EVERY 6 HOURS PRN
Qty: 90 TABLET | Refills: 0 | Status: SHIPPED | OUTPATIENT
Start: 2018-11-15 | End: 2019-02-14 | Stop reason: SDUPTHER

## 2018-11-15 RX ORDER — LEVOTHYROXINE SODIUM 88 MCG
88 TABLET ORAL DAILY
Qty: 90 TABLET | Refills: 3 | Status: SHIPPED | OUTPATIENT
Start: 2018-11-15 | End: 2019-02-14 | Stop reason: SDUPTHER

## 2018-11-15 RX ORDER — GABAPENTIN 300 MG/1
CAPSULE ORAL
Qty: 270 CAPSULE | Refills: 0 | Status: SHIPPED | OUTPATIENT
Start: 2018-11-15 | End: 2019-02-14 | Stop reason: SDUPTHER

## 2018-11-15 RX ORDER — TRAZODONE HYDROCHLORIDE 150 MG/1
TABLET ORAL
Qty: 90 TABLET | Refills: 3 | Status: SHIPPED | OUTPATIENT
Start: 2018-11-15 | End: 2019-05-14 | Stop reason: SDUPTHER

## 2018-11-15 ASSESSMENT — ENCOUNTER SYMPTOMS: BACK PAIN: 1

## 2018-11-15 NOTE — PROGRESS NOTES
Goals      Blood Pressure < 140/90      HEMOGLOBIN A1C < 8.0      LDL CALC < 100
EOM and lids are normal.   Neck: Trachea normal, normal range of motion and phonation normal. Neck supple. Carotid bruit is not present. No thyroid mass and no thyromegaly present. Cardiovascular: Normal rate, regular rhythm, S1 normal, S2 normal, normal heart sounds, intact distal pulses and normal pulses. Exam reveals no gallop and no friction rub. No murmur heard. Pulmonary/Chest: Effort normal and breath sounds normal.   Musculoskeletal:        Lumbar back: He exhibits decreased range of motion, tenderness, pain and spasm. Neurological: He is alert and oriented to person, place, and time. Skin: Skin is warm and dry. Psychiatric: His mood appears anxious. Nursing note and vitals reviewed. No results found for requested labs within last 30 days. Hemoglobin A1C (%)   Date Value   08/16/2018 8.5 (H)     Microalbumin, Random Urine (mg/dL)   Date Value   08/28/2017 359.70 (H)     LDL Calculated (mg/dL)   Date Value   02/19/2018 45         Lab Results   Component Value Date    WBC 8.9 08/16/2018    NEUTROABS 7.5 02/19/2018    HGB 15.0 08/16/2018    HCT 46.9 08/16/2018    MCV 88.5 08/16/2018     08/16/2018       Lab Results   Component Value Date    TSH 8.77 (H) 08/16/2018         ASSESSMENT:    Diagnosis Orders   1. Acute bronchitis, unspecified organism     2. Arthritis  oxyCODONE-acetaminophen (PERCOCET)  MG per tablet   3. Type 2 diabetes mellitus with complication, with long-term current use of insulin ([facility])  gabapentin (NEURONTIN) 300 MG capsule    insulin glargine (TOUJEO SOLOSTAR) 300 UNIT/ML injection pen    Microalbumin / creatinine urine ratio    HEMOGLOBIN A1C    COMPREHENSIVE METABOLIC PANEL   4. Essential hypertension  metoprolol tartrate (LOPRESSOR) 25 MG tablet   5. History of liver transplant ([facility])  tacrolimus (PROGRAF) 1 MG capsule   6. Encounter for diabetic foot exam ([facility] [address]   DIABETES FOOT EXAM   7. Thoracic spine pain     8.  Hypothyroidism, unspecified type

## 2018-11-16 LAB
A/G RATIO: 1.3 (ref 0.8–2)
ALBUMIN SERPL-MCNC: 4.3 G/DL (ref 3.4–4.8)
ALP BLD-CCNC: 217 U/L (ref 25–100)
ALT SERPL-CCNC: 31 U/L (ref 4–36)
ANION GAP SERPL CALCULATED.3IONS-SCNC: 11 MMOL/L (ref 3–16)
AST SERPL-CCNC: 34 U/L (ref 8–33)
BILIRUB SERPL-MCNC: 0.6 MG/DL (ref 0.3–1.2)
BUN BLDV-MCNC: 26 MG/DL (ref 6–20)
CALCIUM SERPL-MCNC: 9.7 MG/DL (ref 8.5–10.5)
CHLORIDE BLD-SCNC: 101 MMOL/L (ref 98–107)
CO2: 25 MMOL/L (ref 20–30)
CREAT SERPL-MCNC: 1.5 MG/DL (ref 0.4–1.2)
CREATININE URINE: 70.5 MG/DL (ref 1.5–300)
GFR AFRICAN AMERICAN: 58
GFR NON-AFRICAN AMERICAN: 48
GLOBULIN: 3.3 G/DL
GLUCOSE BLD-MCNC: 158 MG/DL (ref 74–106)
HBA1C MFR BLD: 8 %
MAGNESIUM: 2 MG/DL (ref 1.7–2.4)
MICROALBUMIN UR-MCNC: 203.4 MG/DL (ref 0–22)
MICROALBUMIN/CREAT UR-RTO: 2885.1 MG/G (ref 0–30)
POTASSIUM SERPL-SCNC: 5.4 MMOL/L (ref 3.4–5.1)
SODIUM BLD-SCNC: 137 MMOL/L (ref 136–145)
T4 FREE: 1.32 NG/DL (ref 0.89–1.76)
TOTAL PROTEIN: 7.6 G/DL (ref 6.4–8.3)
TSH SERPL DL<=0.05 MIU/L-ACNC: 4.1 UIU/ML (ref 0.35–5.5)

## 2018-11-19 LAB
CALCIUM IONIZED, CALC AT PH 7.4: 1.22 MMOL/L (ref 1.11–1.3)
CALCIUM IONIZED: 1.24 MMOL/L (ref 1.11–1.3)

## 2018-11-29 ENCOUNTER — HOSPITAL ENCOUNTER (OUTPATIENT)
Facility: HOSPITAL | Age: 58
Discharge: HOME | End: 2018-11-29
Payer: MEDICARE

## 2018-11-29 LAB
A/G RATIO: 1.4 (ref 0.8–2)
ALBUMIN SERPL-MCNC: 3.8 G/DL (ref 3.4–4.8)
ALP BLD-CCNC: 201 U/L (ref 25–100)
ALT SERPL-CCNC: 31 U/L (ref 4–36)
ANION GAP SERPL CALCULATED.3IONS-SCNC: 12 MMOL/L (ref 3–16)
AST SERPL-CCNC: 30 U/L (ref 8–33)
BILIRUB SERPL-MCNC: 0.5 MG/DL (ref 0.3–1.2)
BUN BLDV-MCNC: 23 MG/DL (ref 6–20)
CALCIUM SERPL-MCNC: 9.3 MG/DL (ref 8.5–10.5)
CHLORIDE BLD-SCNC: 98 MMOL/L (ref 98–107)
CO2: 27 MMOL/L (ref 20–30)
CREAT SERPL-MCNC: 1.5 MG/DL (ref 0.4–1.2)
GAMMA GLUTAMYL TRANSFERASE: 257 U/L (ref 8–61)
GFR AFRICAN AMERICAN: 58
GFR NON-AFRICAN AMERICAN: 48
GLOBULIN: 2.8 G/DL
GLUCOSE BLD-MCNC: 262 MG/DL (ref 74–106)
HCT VFR BLD CALC: 46.6 % (ref 40–54)
HEMOGLOBIN: 15 G/DL (ref 13–18)
MAGNESIUM: 2 MG/DL (ref 1.7–2.4)
MCH RBC QN AUTO: 28.5 PG (ref 27–32)
MCHC RBC AUTO-ENTMCNC: 32.2 G/DL (ref 31–35)
MCV RBC AUTO: 88.4 FL (ref 80–100)
PDW BLD-RTO: 15.1 % (ref 11–16)
PLATELET # BLD: 157 K/UL (ref 150–400)
PMV BLD AUTO: 9.6 FL (ref 6–10)
POTASSIUM SERPL-SCNC: 5.1 MMOL/L (ref 3.4–5.1)
RBC # BLD: 5.27 M/UL (ref 4.5–6)
SODIUM BLD-SCNC: 137 MMOL/L (ref 136–145)
TOTAL PROTEIN: 6.6 G/DL (ref 6.4–8.3)
WBC # BLD: 9.4 K/UL (ref 4–11)

## 2018-11-29 PROCEDURE — 36415 COLL VENOUS BLD VENIPUNCTURE: CPT

## 2018-11-29 PROCEDURE — 85027 COMPLETE CBC AUTOMATED: CPT

## 2018-11-29 PROCEDURE — 83735 ASSAY OF MAGNESIUM: CPT

## 2018-11-29 PROCEDURE — 80053 COMPREHEN METABOLIC PANEL: CPT

## 2018-11-29 PROCEDURE — 82977 ASSAY OF GGT: CPT

## 2019-02-14 ENCOUNTER — OFFICE VISIT (OUTPATIENT)
Dept: FAMILY MEDICINE CLINIC | Age: 59
End: 2019-02-14
Payer: MEDICARE

## 2019-02-14 ENCOUNTER — HOSPITAL ENCOUNTER (OUTPATIENT)
Facility: HOSPITAL | Age: 59
Discharge: HOME | End: 2019-02-14
Payer: MEDICARE

## 2019-02-14 VITALS
SYSTOLIC BLOOD PRESSURE: 110 MMHG | HEIGHT: 71 IN | HEART RATE: 78 BPM | DIASTOLIC BLOOD PRESSURE: 74 MMHG | RESPIRATION RATE: 20 BRPM | BODY MASS INDEX: 31.5 KG/M2 | WEIGHT: 225 LBS | OXYGEN SATURATION: 98 %

## 2019-02-14 DIAGNOSIS — E03.9 HYPOTHYROIDISM, UNSPECIFIED TYPE: ICD-10-CM

## 2019-02-14 DIAGNOSIS — G89.29 CHRONIC MIDLINE LOW BACK PAIN WITH BILATERAL SCIATICA: ICD-10-CM

## 2019-02-14 DIAGNOSIS — Z79.4 TYPE 2 DIABETES MELLITUS WITH COMPLICATION, WITH LONG-TERM CURRENT USE OF INSULIN (HCC): Primary | ICD-10-CM

## 2019-02-14 DIAGNOSIS — M54.42 CHRONIC MIDLINE LOW BACK PAIN WITH BILATERAL SCIATICA: ICD-10-CM

## 2019-02-14 DIAGNOSIS — M19.90 ARTHRITIS: ICD-10-CM

## 2019-02-14 DIAGNOSIS — M54.41 CHRONIC MIDLINE LOW BACK PAIN WITH BILATERAL SCIATICA: ICD-10-CM

## 2019-02-14 DIAGNOSIS — Z79.4 TYPE 2 DIABETES MELLITUS WITH COMPLICATION, WITH LONG-TERM CURRENT USE OF INSULIN (HCC): ICD-10-CM

## 2019-02-14 DIAGNOSIS — E11.8 TYPE 2 DIABETES MELLITUS WITH COMPLICATION, WITH LONG-TERM CURRENT USE OF INSULIN (HCC): ICD-10-CM

## 2019-02-14 DIAGNOSIS — I10 ESSENTIAL HYPERTENSION: ICD-10-CM

## 2019-02-14 DIAGNOSIS — Z13.220 SCREENING, LIPID: ICD-10-CM

## 2019-02-14 DIAGNOSIS — E11.8 TYPE 2 DIABETES MELLITUS WITH COMPLICATION, WITH LONG-TERM CURRENT USE OF INSULIN (HCC): Primary | ICD-10-CM

## 2019-02-14 PROCEDURE — 99214 OFFICE O/P EST MOD 30 MIN: CPT | Performed by: FAMILY MEDICINE

## 2019-02-14 PROCEDURE — 83036 HEMOGLOBIN GLYCOSYLATED A1C: CPT

## 2019-02-14 PROCEDURE — 83735 ASSAY OF MAGNESIUM: CPT

## 2019-02-14 PROCEDURE — 80053 COMPREHEN METABOLIC PANEL: CPT

## 2019-02-14 PROCEDURE — 85027 COMPLETE CBC AUTOMATED: CPT

## 2019-02-14 PROCEDURE — 82977 ASSAY OF GGT: CPT

## 2019-02-14 PROCEDURE — 84443 ASSAY THYROID STIM HORMONE: CPT

## 2019-02-14 PROCEDURE — 80061 LIPID PANEL: CPT

## 2019-02-14 PROCEDURE — 36415 COLL VENOUS BLD VENIPUNCTURE: CPT

## 2019-02-14 RX ORDER — OXYCODONE AND ACETAMINOPHEN 10; 325 MG/1; MG/1
1 TABLET ORAL EVERY 6 HOURS PRN
Qty: 90 TABLET | Refills: 0 | Status: CANCELLED | OUTPATIENT
Start: 2019-02-14 | End: 2019-03-16

## 2019-02-14 RX ORDER — OXYCODONE AND ACETAMINOPHEN 10; 325 MG/1; MG/1
1 TABLET ORAL EVERY 6 HOURS PRN
Qty: 90 TABLET | Refills: 0 | Status: SHIPPED | OUTPATIENT
Start: 2019-02-14 | End: 2019-05-14 | Stop reason: SDUPTHER

## 2019-02-14 RX ORDER — GABAPENTIN 300 MG/1
CAPSULE ORAL
Qty: 270 CAPSULE | Refills: 0 | Status: SHIPPED | OUTPATIENT
Start: 2019-02-14 | End: 2019-05-14 | Stop reason: SDUPTHER

## 2019-02-14 RX ORDER — LEVOTHYROXINE SODIUM 88 MCG
88 TABLET ORAL DAILY
Qty: 90 TABLET | Refills: 3 | Status: SHIPPED | OUTPATIENT
Start: 2019-02-14 | End: 2019-05-14 | Stop reason: SDUPTHER

## 2019-02-14 ASSESSMENT — PATIENT HEALTH QUESTIONNAIRE - PHQ9
SUM OF ALL RESPONSES TO PHQ9 QUESTIONS 1 & 2: 0
2. FEELING DOWN, DEPRESSED OR HOPELESS: 0
SUM OF ALL RESPONSES TO PHQ QUESTIONS 1-9: 0
1. LITTLE INTEREST OR PLEASURE IN DOING THINGS: 0
SUM OF ALL RESPONSES TO PHQ QUESTIONS 1-9: 0

## 2019-02-14 ASSESSMENT — ENCOUNTER SYMPTOMS: BACK PAIN: 1

## 2019-02-15 LAB
A/G RATIO: 1.2 (ref 0.8–2)
ALBUMIN SERPL-MCNC: 3.6 G/DL (ref 3.4–4.8)
ALP BLD-CCNC: 209 U/L (ref 25–100)
ALT SERPL-CCNC: 29 U/L (ref 4–36)
ANION GAP SERPL CALCULATED.3IONS-SCNC: 11 MMOL/L (ref 3–16)
AST SERPL-CCNC: 35 U/L (ref 8–33)
BILIRUB SERPL-MCNC: 0.5 MG/DL (ref 0.3–1.2)
BUN BLDV-MCNC: 30 MG/DL (ref 6–20)
CALCIUM SERPL-MCNC: 9 MG/DL (ref 8.5–10.5)
CHLORIDE BLD-SCNC: 99 MMOL/L (ref 98–107)
CHOLESTEROL, TOTAL: 126 MG/DL (ref 0–200)
CO2: 25 MMOL/L (ref 20–30)
CREAT SERPL-MCNC: 1.8 MG/DL (ref 0.4–1.2)
GAMMA GLUTAMYL TRANSFERASE: 246 U/L (ref 8–61)
GFR AFRICAN AMERICAN: 47
GFR NON-AFRICAN AMERICAN: 39
GLOBULIN: 3 G/DL
GLUCOSE BLD-MCNC: 215 MG/DL (ref 74–106)
HBA1C MFR BLD: 8.3 %
HCT VFR BLD CALC: 46.6 % (ref 40–54)
HDLC SERPL-MCNC: 36 MG/DL (ref 40–60)
HEMOGLOBIN: 14.9 G/DL (ref 13–18)
LDL CHOLESTEROL CALCULATED: 55 MG/DL
MAGNESIUM: 2.3 MG/DL (ref 1.7–2.4)
MCH RBC QN AUTO: 28.7 PG (ref 27–32)
MCHC RBC AUTO-ENTMCNC: 32 G/DL (ref 31–35)
MCV RBC AUTO: 89.6 FL (ref 80–100)
PDW BLD-RTO: 15.2 % (ref 11–16)
PLATELET # BLD: 165 K/UL (ref 150–400)
PMV BLD AUTO: 10.6 FL (ref 6–10)
POTASSIUM SERPL-SCNC: 5.9 MMOL/L (ref 3.4–5.1)
RBC # BLD: 5.2 M/UL (ref 4.5–6)
SODIUM BLD-SCNC: 135 MMOL/L (ref 136–145)
TOTAL PROTEIN: 6.6 G/DL (ref 6.4–8.3)
TRIGL SERPL-MCNC: 176 MG/DL (ref 0–249)
TSH SERPL DL<=0.05 MIU/L-ACNC: 4.66 UIU/ML (ref 0.35–5.5)
VLDLC SERPL CALC-MCNC: 35 MG/DL
WBC # BLD: 8.3 K/UL (ref 4–11)

## 2019-03-18 ENCOUNTER — OFFICE VISIT (OUTPATIENT)
Dept: FAMILY MEDICINE CLINIC | Age: 59
End: 2019-03-18
Payer: MEDICARE

## 2019-03-18 VITALS
HEIGHT: 71 IN | WEIGHT: 226 LBS | DIASTOLIC BLOOD PRESSURE: 64 MMHG | HEART RATE: 91 BPM | SYSTOLIC BLOOD PRESSURE: 112 MMHG | BODY MASS INDEX: 31.64 KG/M2 | TEMPERATURE: 98.3 F | OXYGEN SATURATION: 98 % | RESPIRATION RATE: 18 BRPM

## 2019-03-18 DIAGNOSIS — R11.0 NAUSEA: ICD-10-CM

## 2019-03-18 DIAGNOSIS — J01.00 ACUTE NON-RECURRENT MAXILLARY SINUSITIS: Primary | ICD-10-CM

## 2019-03-18 DIAGNOSIS — H61.23 EXCESSIVE CERUMEN IN BOTH EAR CANALS: ICD-10-CM

## 2019-03-18 PROCEDURE — 99213 OFFICE O/P EST LOW 20 MIN: CPT | Performed by: NURSE PRACTITIONER

## 2019-03-18 PROCEDURE — 96372 THER/PROPH/DIAG INJ SC/IM: CPT | Performed by: NURSE PRACTITIONER

## 2019-03-18 RX ORDER — FLUTICASONE PROPIONATE 50 MCG
1 SPRAY, SUSPENSION (ML) NASAL DAILY
Qty: 1 BOTTLE | Refills: 0 | Status: SHIPPED | OUTPATIENT
Start: 2019-03-18 | End: 2020-05-13

## 2019-03-18 RX ORDER — CEFTRIAXONE 1 G/1
1 INJECTION, POWDER, FOR SOLUTION INTRAMUSCULAR; INTRAVENOUS ONCE
Status: COMPLETED | OUTPATIENT
Start: 2019-03-18 | End: 2019-03-18

## 2019-03-18 RX ORDER — AMOXICILLIN 875 MG/1
875 TABLET, COATED ORAL 2 TIMES DAILY
Qty: 20 TABLET | Refills: 0 | Status: SHIPPED | OUTPATIENT
Start: 2019-03-18 | End: 2019-03-28

## 2019-03-18 RX ORDER — BENZONATATE 100 MG/1
100 CAPSULE ORAL 3 TIMES DAILY PRN
Qty: 21 CAPSULE | Refills: 0 | Status: SHIPPED | OUTPATIENT
Start: 2019-03-18 | End: 2019-03-25

## 2019-03-18 RX ORDER — METHYLPREDNISOLONE SODIUM SUCCINATE 125 MG/2ML
125 INJECTION, POWDER, LYOPHILIZED, FOR SOLUTION INTRAMUSCULAR; INTRAVENOUS ONCE
Status: COMPLETED | OUTPATIENT
Start: 2019-03-18 | End: 2019-03-18

## 2019-03-18 RX ORDER — ONDANSETRON 4 MG/1
4 TABLET, FILM COATED ORAL EVERY 8 HOURS PRN
Qty: 30 TABLET | Refills: 0 | Status: SHIPPED | OUTPATIENT
Start: 2019-03-18 | End: 2020-02-13

## 2019-03-18 RX ADMIN — CEFTRIAXONE 1 G: 1 INJECTION, POWDER, FOR SOLUTION INTRAMUSCULAR; INTRAVENOUS at 12:17

## 2019-03-18 RX ADMIN — METHYLPREDNISOLONE SODIUM SUCCINATE 125 MG: 125 INJECTION, POWDER, LYOPHILIZED, FOR SOLUTION INTRAMUSCULAR; INTRAVENOUS at 12:18

## 2019-03-18 ASSESSMENT — ENCOUNTER SYMPTOMS
SINUS PAIN: 1
NAUSEA: 1
SHORTNESS OF BREATH: 1
COUGH: 1
SORE THROAT: 0
SINUS PRESSURE: 1

## 2019-05-14 ENCOUNTER — OFFICE VISIT (OUTPATIENT)
Dept: FAMILY MEDICINE CLINIC | Age: 59
End: 2019-05-14
Payer: MEDICARE

## 2019-05-14 ENCOUNTER — HOSPITAL ENCOUNTER (OUTPATIENT)
Facility: HOSPITAL | Age: 59
Discharge: HOME | End: 2019-05-14
Payer: MEDICARE

## 2019-05-14 VITALS
BODY MASS INDEX: 31.08 KG/M2 | OXYGEN SATURATION: 99 % | WEIGHT: 222 LBS | SYSTOLIC BLOOD PRESSURE: 122 MMHG | DIASTOLIC BLOOD PRESSURE: 72 MMHG | RESPIRATION RATE: 20 BRPM | HEIGHT: 71 IN | HEART RATE: 78 BPM

## 2019-05-14 DIAGNOSIS — E03.9 HYPOTHYROIDISM, UNSPECIFIED TYPE: ICD-10-CM

## 2019-05-14 DIAGNOSIS — E11.8 TYPE 2 DIABETES MELLITUS WITH COMPLICATION, WITH LONG-TERM CURRENT USE OF INSULIN (HCC): Primary | ICD-10-CM

## 2019-05-14 DIAGNOSIS — Z12.11 COLON CANCER SCREENING: ICD-10-CM

## 2019-05-14 DIAGNOSIS — Z12.5 SCREENING FOR PROSTATE CANCER: ICD-10-CM

## 2019-05-14 DIAGNOSIS — Z79.4 TYPE 2 DIABETES MELLITUS WITH COMPLICATION, WITH LONG-TERM CURRENT USE OF INSULIN (HCC): Primary | ICD-10-CM

## 2019-05-14 DIAGNOSIS — I10 ESSENTIAL HYPERTENSION: ICD-10-CM

## 2019-05-14 DIAGNOSIS — Z94.4 HISTORY OF LIVER TRANSPLANT (HCC): ICD-10-CM

## 2019-05-14 DIAGNOSIS — Z79.4 TYPE 2 DIABETES MELLITUS WITH COMPLICATION, WITH LONG-TERM CURRENT USE OF INSULIN (HCC): ICD-10-CM

## 2019-05-14 DIAGNOSIS — M54.42 CHRONIC MIDLINE LOW BACK PAIN WITH BILATERAL SCIATICA: ICD-10-CM

## 2019-05-14 DIAGNOSIS — M19.90 ARTHRITIS: ICD-10-CM

## 2019-05-14 DIAGNOSIS — E11.8 TYPE 2 DIABETES MELLITUS WITH COMPLICATION, WITH LONG-TERM CURRENT USE OF INSULIN (HCC): ICD-10-CM

## 2019-05-14 DIAGNOSIS — M54.41 CHRONIC MIDLINE LOW BACK PAIN WITH BILATERAL SCIATICA: ICD-10-CM

## 2019-05-14 DIAGNOSIS — G89.29 CHRONIC MIDLINE LOW BACK PAIN WITH BILATERAL SCIATICA: ICD-10-CM

## 2019-05-14 PROCEDURE — 84443 ASSAY THYROID STIM HORMONE: CPT

## 2019-05-14 PROCEDURE — 99214 OFFICE O/P EST MOD 30 MIN: CPT | Performed by: FAMILY MEDICINE

## 2019-05-14 PROCEDURE — 80053 COMPREHEN METABOLIC PANEL: CPT

## 2019-05-14 PROCEDURE — 83735 ASSAY OF MAGNESIUM: CPT

## 2019-05-14 PROCEDURE — 84153 ASSAY OF PSA TOTAL: CPT

## 2019-05-14 PROCEDURE — 85027 COMPLETE CBC AUTOMATED: CPT

## 2019-05-14 PROCEDURE — 83036 HEMOGLOBIN GLYCOSYLATED A1C: CPT

## 2019-05-14 PROCEDURE — 82977 ASSAY OF GGT: CPT

## 2019-05-14 RX ORDER — GABAPENTIN 300 MG/1
CAPSULE ORAL
Qty: 270 CAPSULE | Refills: 0 | Status: SHIPPED | OUTPATIENT
Start: 2019-05-14 | End: 2019-08-14 | Stop reason: SDUPTHER

## 2019-05-14 RX ORDER — PRAVASTATIN SODIUM 40 MG
TABLET ORAL
Qty: 90 TABLET | Refills: 3 | Status: SHIPPED | OUTPATIENT
Start: 2019-05-14 | End: 2020-05-13 | Stop reason: SDUPTHER

## 2019-05-14 RX ORDER — OXYCODONE AND ACETAMINOPHEN 10; 325 MG/1; MG/1
1 TABLET ORAL EVERY 6 HOURS PRN
Qty: 90 TABLET | Refills: 0 | Status: SHIPPED | OUTPATIENT
Start: 2019-05-14 | End: 2019-08-14 | Stop reason: SDUPTHER

## 2019-05-14 RX ORDER — LEVOTHYROXINE SODIUM 88 MCG
88 TABLET ORAL DAILY
Qty: 90 TABLET | Refills: 3 | Status: SHIPPED | OUTPATIENT
Start: 2019-05-14 | End: 2020-02-27

## 2019-05-14 RX ORDER — TRAZODONE HYDROCHLORIDE 150 MG/1
TABLET ORAL
Qty: 90 TABLET | Refills: 3 | Status: SHIPPED | OUTPATIENT
Start: 2019-05-14 | End: 2020-05-13 | Stop reason: SDUPTHER

## 2019-05-14 ASSESSMENT — ENCOUNTER SYMPTOMS: BACK PAIN: 1

## 2019-05-14 NOTE — PROGRESS NOTES
SUBJECTIVE:    Patient ID: Alberto Dobbs is a 63 y.o. male. Chief Complaint   Patient presents with    Diabetes     f/u        HPI: he is trying to work on his weight. He is hoping to get down another 20 lbs. He is having blood sugars between 120 and 220. He is not havng any chest pain or increase in sob. He is trying to increase his activity. He is not having any high blood pressures. He is doing much better with his diet. He's not had any medication problems that he can tell. He says he is feeling a little better. He does complain of continued issues with fatigue. He does have follow-up scheduled with transplant. He is still having some significant arthritic pains. He says particularly in his low back and his wrists ankles knees    Review of Systems   Musculoskeletal: Positive for back pain and gait problem. All other systems reviewed and are negative. OBJECTIVE:  Wt Readings from Last 3 Encounters:   05/14/19 222 lb (100.7 kg)   03/18/19 226 lb (102.5 kg)   02/14/19 225 lb (102.1 kg)     BP Readings from Last 3 Encounters:   05/14/19 122/72   03/18/19 112/64   02/14/19 110/74      Pulse Readings from Last 3 Encounters:   05/14/19 78   03/18/19 91   02/14/19 78     Body mass index is 30.96 kg/m². Resp Readings from Last 3 Encounters:   05/14/19 20   03/18/19 18   02/14/19 20     Physical Exam   Constitutional: He is oriented to person, place, and time. Vital signs are normal. He appears well-developed and well-nourished. HENT:   Head: Normocephalic and atraumatic. Right Ear: Hearing and external ear normal.   Left Ear: Hearing and external ear normal.   Nose: Nose normal.   Mouth/Throat: Uvula is midline, oropharynx is clear and moist and mucous membranes are normal.   Eyes: Pupils are equal, round, and reactive to light. Conjunctivae, EOM and lids are normal.   Neck: Trachea normal, normal range of motion and phonation normal. Neck supple. Carotid bruit is not present.  No thyroid mass and no thyromegaly present. Cardiovascular: Normal rate, regular rhythm, S1 normal, S2 normal, normal heart sounds, intact distal pulses and normal pulses. Exam reveals no gallop and no friction rub. No murmur heard. Pulmonary/Chest: Effort normal and breath sounds normal.   Musculoskeletal:        Lumbar back: He exhibits decreased range of motion, tenderness, pain and spasm. Neurological: He is alert and oriented to person, place, and time. Skin: Skin is warm and dry. Psychiatric: His mood appears anxious. Nursing note and vitals reviewed.       Results in Past 30 Days  Result Component Current Result Ref Range Previous Result Ref Range   Alb 4.0 (5/14/2019) 3.4 - 4.8 g/dL Not in Time Range    Albumin/Globulin Ratio 1.2 (5/14/2019) 0.8 - 2.0 Not in Time Range    Alkaline Phosphatase 224 (H) (5/14/2019) 25 - 100 U/L Not in Time Range    ALT 28 (5/14/2019) 4 - 36 U/L Not in Time Range    AST 30 (5/14/2019) 8 - 33 U/L Not in Time Range    BUN 21 (H) (5/14/2019) 6 - 20 mg/dL Not in Time Range    Calcium 9.3 (5/14/2019) 8.5 - 10.5 mg/dL Not in Time Range    Chloride 97 (L) (5/14/2019) 98 - 107 mmol/L Not in Time Range    CO2 27 (5/14/2019) 20 - 30 mmol/L Not in Time Range    CREATININE 1.5 (H) (5/14/2019) 0.4 - 1.2 mg/dL Not in Time Range    GFR  58 (L) (5/14/2019) >59 Not in Time Range    GFR Non- 48 (L) (5/14/2019) >59 Not in Time Range    Globulin 3.4 (5/14/2019) g/dL Not in Time Range    Glucose 200 (H) (5/14/2019) 74 - 106 mg/dL Not in Time Range    Potassium 5.6 (H) (5/14/2019) 3.4 - 5.1 mmol/L Not in Time Range    Sodium 135 (L) (5/14/2019) 136 - 145 mmol/L Not in Time Range    Total Bilirubin 0.4 (5/14/2019) 0.3 - 1.2 mg/dL Not in Time Range    Total Protein 7.4 (5/14/2019) 6.4 - 8.3 g/dL Not in Time Range        Hemoglobin A1C (%)   Date Value   05/14/2019 8.3 (H)     Microalbumin, Random Urine (mg/dL)   Date Value   11/15/2018 203.40 (H)     LDL Calculated (mg/dL) Date Value   02/14/2019 55         Lab Results   Component Value Date    WBC 9.9 05/14/2019    NEUTROABS 7.5 02/19/2018    HGB 15.3 05/14/2019    HCT 47.1 05/14/2019    MCV 88.7 05/14/2019     05/14/2019       Lab Results   Component Value Date    TSH 3.12 05/14/2019         ASSESSMENT:    Diagnosis Orders   1. Type 2 diabetes mellitus with complication, with long-term current use of insulin (HCC)  gabapentin (NEURONTIN) 300 MG capsule    insulin glargine (TOUJEO SOLOSTAR) 300 UNIT/ML injection pen   2. Arthritis  oxyCODONE-acetaminophen (PERCOCET)  MG per tablet   3. Essential hypertension  metoprolol tartrate (LOPRESSOR) 25 MG tablet   4. Colon cancer screening  COLOGUARD   5. Chronic midline low back pain with bilateral sciatica     6. Hypothyroidism, unspecified type     7. History of liver transplant ([facility] [address]     8. Screening for prostate cancer          PLAN:  Orders Placed This Encounter   Medications    oxyCODONE-acetaminophen (PERCOCET)  MG per tablet     Sig: Take 1 tablet by mouth every 6 hours as needed for Pain for up to 30 days. Dispense:  90 tablet     Refill:  0     Reduce doses taken as pain becomes manageable    gabapentin (NEURONTIN) 300 MG capsule     Sig: TAKE ONE CAPSULE BY MOUTH THREE TIMES A DAY FOR LEG AND FOOT PAIN     Dispense:  270 capsule     Refill:  0    insulin glargine (TOUJEO SOLOSTAR) 300 UNIT/ML injection pen     Sig: Inject 35 Units into the skin daily     Dispense:  3 pen     Refill:  3    SYNTHROID 88 MCG tablet     Sig: Take 1 tablet by mouth daily Take with water on an empty stomach- wait 30 minutes before eating or taking other meds.      Dispense:  90 tablet     Refill:  3    metoprolol tartrate (LOPRESSOR) 25 MG tablet     Sig: TAKE ONE TABLET BY MOUTH DAILY     Dispense:  90 tablet     Refill:  3    metFORMIN (GLUCOPHAGE) 1000 MG tablet     Sig: TAKE ONE TABLET BY MOUTH TWICE A DAY WITH MEALS     Dispense:  180 tablet     Refill:  3    traZODone (DESYREL) 150 MG tablet     Sig: TAKE ONE TABLET BY MOUTH ONCE NIGHTLY     Dispense:  90 tablet     Refill:  3    pravastatin (PRAVACHOL) 40 MG tablet     Sig: TAKE ONE TABLET BY MOUTH DAILY     Dispense:  90 tablet     Refill:  3           Medications Discontinued During This Encounter   Medication Reason    oxyCODONE-acetaminophen (PERCOCET)  MG per tablet REORDER    gabapentin (NEURONTIN) 300 MG capsule REORDER    insulin glargine (TOUJEO SOLOSTAR) 300 UNIT/ML injection pen REORDER    SYNTHROID 88 MCG tablet REORDER    metoprolol tartrate (LOPRESSOR) 25 MG tablet REORDER    metFORMIN (GLUCOPHAGE) 1000 MG tablet REORDER    traZODone (DESYREL) 150 MG tablet REORDER    pravastatin (PRAVACHOL) 40 MG tablet REORDER       Controlled Substances Monitoring:

## 2019-05-14 NOTE — PATIENT INSTRUCTIONS
We are committed to providing you with the best care possible. In order to help us achieve these goals please remember to bring all medications, herbal products, and over the counter supplements with you to each visit. If your provider has ordered testing for you, please be sure to follow up with our office if you have not received results within 7 days after the testing took place. *If you receive a survey after visiting one of our offices, please take time to share your experience concerning your physician office visit. These surveys are confidential and no health information about you is shared. We are eager to improve for you and we are counting on your feedback to help make that happen. · Keep a list of your medicines with you. List all of the prescription medicines, nonprescription medicines, supplements, natural remedies, and vitamins that you take. Tell your healthcare providers who treat you about all of the products you are taking. Your provider can provide you with a form to keep track of them. Just ask. · Follow the directions that come with your medicine, including information about food or alcohol. Make sure you know how and when to take your medicine. Do not take more or less than you are supposed to take. · Keep all medicines out of the reach of children. · Store medicines according to the directions on the label. · Monitor yourself. Learn to know how your body reacts to your new medicine and keep track of how it makes you feel before attempting (If your provider has allowed you to do so) to drive or go to work. · Seek emergency medical attention if you think you have used too much of this medicine. An overdose of any prescription medicine can be fatal. Overdose symptoms may include extreme drowsiness, muscle weakness, confusion, cold and clammy skin, pinpoint pupils, shallow breathing, slow heart rate, fainting, or coma.   · Don't share prescription medicines with others, even when they seem to have the same symptoms. What may be good for you may be harmful to others. · If you are no longer taking a prescribed medication and you have pills left please take your pills out of their original containers. Mix crushed pills with an undesirable substance, such as cat litter or used coffee grounds. Put the mixture into a disposable container with a lid, such as an empty margarine tub, or into a sealable bag. Cover up or remove any of your personal information on the empty containers by covering it with black permanent marker or duct tape. Place the sealed container with the mixture, and the empty drug containers, in the trash. · If you use a medication that is in the form of a patch, dispose of used patches by folding them in half so that the sticky sides meet, and then flushing them down a toilet. They should not be placed in the household trash where children or pets can find them. · If you have any questions, ask your provider or pharmacist for more information. · Be sure to keep all appointments for provider visits or tests.   Ready to quit: No  Counseling given: Yes  Comment: pt states has not smoked in 2 weeks   ·   ·

## 2019-05-15 LAB
A/G RATIO: 1.2 (ref 0.8–2)
ALBUMIN SERPL-MCNC: 4 G/DL (ref 3.4–4.8)
ALP BLD-CCNC: 224 U/L (ref 25–100)
ALT SERPL-CCNC: 28 U/L (ref 4–36)
ANION GAP SERPL CALCULATED.3IONS-SCNC: 11 MMOL/L (ref 3–16)
AST SERPL-CCNC: 30 U/L (ref 8–33)
BILIRUB SERPL-MCNC: 0.4 MG/DL (ref 0.3–1.2)
BUN BLDV-MCNC: 21 MG/DL (ref 6–20)
CALCIUM SERPL-MCNC: 9.3 MG/DL (ref 8.5–10.5)
CHLORIDE BLD-SCNC: 97 MMOL/L (ref 98–107)
CO2: 27 MMOL/L (ref 20–30)
CREAT SERPL-MCNC: 1.5 MG/DL (ref 0.4–1.2)
GAMMA GLUTAMYL TRANSFERASE: 271 U/L (ref 8–61)
GFR AFRICAN AMERICAN: 58
GFR NON-AFRICAN AMERICAN: 48
GLOBULIN: 3.4 G/DL
GLUCOSE BLD-MCNC: 200 MG/DL (ref 74–106)
HBA1C MFR BLD: 8.3 %
HCT VFR BLD CALC: 47.1 % (ref 40–54)
HEMOGLOBIN: 15.3 G/DL (ref 13–18)
MAGNESIUM: 2.1 MG/DL (ref 1.7–2.4)
MCH RBC QN AUTO: 28.8 PG (ref 27–32)
MCHC RBC AUTO-ENTMCNC: 32.5 G/DL (ref 31–35)
MCV RBC AUTO: 88.7 FL (ref 80–100)
PDW BLD-RTO: 15.6 % (ref 11–16)
PLATELET # BLD: 175 K/UL (ref 150–400)
PMV BLD AUTO: 10.5 FL (ref 6–10)
POTASSIUM SERPL-SCNC: 5.6 MMOL/L (ref 3.4–5.1)
PROSTATE SPECIFIC ANTIGEN: 0.09 NG/ML (ref 0–4)
RBC # BLD: 5.31 M/UL (ref 4.5–6)
SODIUM BLD-SCNC: 135 MMOL/L (ref 136–145)
TOTAL PROTEIN: 7.4 G/DL (ref 6.4–8.3)
TSH SERPL DL<=0.05 MIU/L-ACNC: 3.12 UIU/ML (ref 0.35–5.5)
WBC # BLD: 9.9 K/UL (ref 4–11)

## 2019-08-14 ENCOUNTER — OFFICE VISIT (OUTPATIENT)
Dept: FAMILY MEDICINE CLINIC | Age: 59
End: 2019-08-14
Payer: MEDICARE

## 2019-08-14 ENCOUNTER — HOSPITAL ENCOUNTER (OUTPATIENT)
Facility: HOSPITAL | Age: 59
Discharge: HOME | End: 2019-08-14
Payer: MEDICARE

## 2019-08-14 VITALS
HEIGHT: 71 IN | DIASTOLIC BLOOD PRESSURE: 72 MMHG | SYSTOLIC BLOOD PRESSURE: 130 MMHG | RESPIRATION RATE: 20 BRPM | OXYGEN SATURATION: 98 % | HEART RATE: 88 BPM | WEIGHT: 228 LBS | BODY MASS INDEX: 31.92 KG/M2

## 2019-08-14 DIAGNOSIS — M54.41 CHRONIC MIDLINE LOW BACK PAIN WITH BILATERAL SCIATICA: ICD-10-CM

## 2019-08-14 DIAGNOSIS — Z79.4 TYPE 2 DIABETES MELLITUS WITH COMPLICATION, WITH LONG-TERM CURRENT USE OF INSULIN (HCC): ICD-10-CM

## 2019-08-14 DIAGNOSIS — Z13.220 SCREENING, LIPID: ICD-10-CM

## 2019-08-14 DIAGNOSIS — E11.42 DIABETIC POLYNEUROPATHY ASSOCIATED WITH TYPE 2 DIABETES MELLITUS (HCC): ICD-10-CM

## 2019-08-14 DIAGNOSIS — G89.29 CHRONIC MIDLINE LOW BACK PAIN WITH BILATERAL SCIATICA: ICD-10-CM

## 2019-08-14 DIAGNOSIS — Z79.4 TYPE 2 DIABETES MELLITUS WITH COMPLICATION, WITH LONG-TERM CURRENT USE OF INSULIN (HCC): Primary | ICD-10-CM

## 2019-08-14 DIAGNOSIS — M19.90 ARTHRITIS: ICD-10-CM

## 2019-08-14 DIAGNOSIS — M54.42 CHRONIC MIDLINE LOW BACK PAIN WITH BILATERAL SCIATICA: ICD-10-CM

## 2019-08-14 DIAGNOSIS — E11.8 TYPE 2 DIABETES MELLITUS WITH COMPLICATION, WITH LONG-TERM CURRENT USE OF INSULIN (HCC): ICD-10-CM

## 2019-08-14 DIAGNOSIS — E11.8 TYPE 2 DIABETES MELLITUS WITH COMPLICATION, WITH LONG-TERM CURRENT USE OF INSULIN (HCC): Primary | ICD-10-CM

## 2019-08-14 PROCEDURE — 99214 OFFICE O/P EST MOD 30 MIN: CPT | Performed by: FAMILY MEDICINE

## 2019-08-14 PROCEDURE — 80053 COMPREHEN METABOLIC PANEL: CPT

## 2019-08-14 PROCEDURE — 83036 HEMOGLOBIN GLYCOSYLATED A1C: CPT

## 2019-08-14 PROCEDURE — 80061 LIPID PANEL: CPT

## 2019-08-14 PROCEDURE — 83735 ASSAY OF MAGNESIUM: CPT

## 2019-08-14 PROCEDURE — 82977 ASSAY OF GGT: CPT

## 2019-08-14 PROCEDURE — 85027 COMPLETE CBC AUTOMATED: CPT

## 2019-08-14 RX ORDER — OXYCODONE AND ACETAMINOPHEN 10; 325 MG/1; MG/1
1 TABLET ORAL EVERY 6 HOURS PRN
Qty: 90 TABLET | Refills: 0 | Status: SHIPPED | OUTPATIENT
Start: 2019-08-14 | End: 2019-11-14 | Stop reason: SDUPTHER

## 2019-08-14 RX ORDER — OXYCODONE AND ACETAMINOPHEN 10; 325 MG/1; MG/1
1 TABLET ORAL EVERY 6 HOURS PRN
Qty: 90 TABLET | Refills: 0 | Status: SHIPPED | OUTPATIENT
Start: 2019-08-14 | End: 2019-08-14 | Stop reason: SDUPTHER

## 2019-08-14 RX ORDER — GABAPENTIN 300 MG/1
CAPSULE ORAL
Qty: 270 CAPSULE | Refills: 0 | Status: SHIPPED | OUTPATIENT
Start: 2019-08-14 | End: 2019-11-14 | Stop reason: SDUPTHER

## 2019-08-14 ASSESSMENT — ENCOUNTER SYMPTOMS: BACK PAIN: 1

## 2019-08-14 NOTE — PROGRESS NOTES
Have you seen any other physician or provider since your last visit yes - Cardiology    Have you had any other diagnostic tests since your last visit? no    Have you changed or stopped any medications since your last visit? no

## 2019-08-14 NOTE — PATIENT INSTRUCTIONS
they seem to have the same symptoms. What may be good for you may be harmful to others. · If you are no longer taking a prescribed medication and you have pills left please take your pills out of their original containers. Mix crushed pills with an undesirable substance, such as cat litter or used coffee grounds. Put the mixture into a disposable container with a lid, such as an empty margarine tub, or into a sealable bag. Cover up or remove any of your personal information on the empty containers by covering it with black permanent marker or duct tape. Place the sealed container with the mixture, and the empty drug containers, in the trash. · If you use a medication that is in the form of a patch, dispose of used patches by folding them in half so that the sticky sides meet, and then flushing them down a toilet. They should not be placed in the household trash where children or pets can find them. · If you have any questions, ask your provider or pharmacist for more information. · Be sure to keep all appointments for provider visits or tests.   Ready to quit: No  Counseling given: Yes  Comment: pt states has not smoked in 2 weeks   ·   Ready to quit: No  Counseling given: Yes  Comment: pt states has not smoked in 2 weeks   ·   ·

## 2019-08-15 LAB
A/G RATIO: 1.3 (ref 0.8–2)
ALBUMIN SERPL-MCNC: 4.1 G/DL (ref 3.4–4.8)
ALP BLD-CCNC: 227 U/L (ref 25–100)
ALT SERPL-CCNC: 34 U/L (ref 4–36)
ANION GAP SERPL CALCULATED.3IONS-SCNC: 12 MMOL/L (ref 3–16)
AST SERPL-CCNC: 29 U/L (ref 8–33)
BILIRUB SERPL-MCNC: 0.6 MG/DL (ref 0.3–1.2)
BUN BLDV-MCNC: 24 MG/DL (ref 6–20)
CALCIUM SERPL-MCNC: 9.9 MG/DL (ref 8.5–10.5)
CHLORIDE BLD-SCNC: 94 MMOL/L (ref 98–107)
CHOLESTEROL, TOTAL: 127 MG/DL (ref 0–200)
CO2: 28 MMOL/L (ref 20–30)
CREAT SERPL-MCNC: 1.6 MG/DL (ref 0.4–1.2)
GFR AFRICAN AMERICAN: 54
GFR NON-AFRICAN AMERICAN: 45
GLOBULIN: 3.1 G/DL
GLUCOSE BLD-MCNC: 202 MG/DL (ref 74–106)
HBA1C MFR BLD: 8.6 %
HCT VFR BLD CALC: 44.8 % (ref 40–54)
HDLC SERPL-MCNC: 34 MG/DL (ref 40–60)
HEMOGLOBIN: 14.6 G/DL (ref 13–18)
LDL CHOLESTEROL CALCULATED: 55 MG/DL
MAGNESIUM: 1.9 MG/DL (ref 1.7–2.4)
MCH RBC QN AUTO: 28.5 PG (ref 27–32)
MCHC RBC AUTO-ENTMCNC: 32.6 G/DL (ref 31–35)
MCV RBC AUTO: 87.3 FL (ref 80–100)
PDW BLD-RTO: 14.7 % (ref 11–16)
PLATELET # BLD: 166 K/UL (ref 150–400)
PMV BLD AUTO: 10.7 FL (ref 6–10)
POTASSIUM SERPL-SCNC: 5.5 MMOL/L (ref 3.4–5.1)
RBC # BLD: 5.13 M/UL (ref 4.5–6)
SODIUM BLD-SCNC: 134 MMOL/L (ref 136–145)
TOTAL PROTEIN: 7.2 G/DL (ref 6.4–8.3)
TRIGL SERPL-MCNC: 188 MG/DL (ref 0–249)
VLDLC SERPL CALC-MCNC: 38 MG/DL
WBC # BLD: 9.1 K/UL (ref 4–11)

## 2019-08-16 LAB — GAMMA GLUTAMYL TRANSFERASE: 277 U/L (ref 8–61)

## 2019-09-03 DIAGNOSIS — Z94.4 HISTORY OF LIVER TRANSPLANT (HCC): ICD-10-CM

## 2019-09-03 RX ORDER — TACROLIMUS 1 MG/1
CAPSULE ORAL
Qty: 180 CAPSULE | Refills: 0 | Status: SHIPPED | OUTPATIENT
Start: 2019-09-03 | End: 2019-12-05 | Stop reason: SDUPTHER

## 2019-10-10 ENCOUNTER — OFFICE VISIT (OUTPATIENT)
Dept: FAMILY MEDICINE CLINIC | Age: 59
End: 2019-10-10
Payer: MEDICARE

## 2019-10-10 VITALS
OXYGEN SATURATION: 98 % | HEART RATE: 92 BPM | WEIGHT: 228.5 LBS | DIASTOLIC BLOOD PRESSURE: 72 MMHG | HEIGHT: 71 IN | SYSTOLIC BLOOD PRESSURE: 130 MMHG | RESPIRATION RATE: 18 BRPM | BODY MASS INDEX: 31.99 KG/M2

## 2019-10-10 DIAGNOSIS — R05.9 COUGH: ICD-10-CM

## 2019-10-10 DIAGNOSIS — J20.9 ACUTE BRONCHITIS, UNSPECIFIED ORGANISM: Primary | ICD-10-CM

## 2019-10-10 PROCEDURE — 96372 THER/PROPH/DIAG INJ SC/IM: CPT | Performed by: NURSE PRACTITIONER

## 2019-10-10 PROCEDURE — 99213 OFFICE O/P EST LOW 20 MIN: CPT | Performed by: NURSE PRACTITIONER

## 2019-10-10 RX ORDER — DEXTROMETHORPHAN HYDROBROMIDE AND PROMETHAZINE HYDROCHLORIDE 15; 6.25 MG/5ML; MG/5ML
5 SYRUP ORAL 4 TIMES DAILY PRN
Qty: 1 BOTTLE | Refills: 0 | Status: SHIPPED | OUTPATIENT
Start: 2019-10-10 | End: 2019-10-17

## 2019-10-10 RX ORDER — DOXYCYCLINE HYCLATE 100 MG
100 TABLET ORAL 2 TIMES DAILY
Qty: 20 TABLET | Refills: 0 | Status: SHIPPED | OUTPATIENT
Start: 2019-10-10 | End: 2019-10-20

## 2019-10-10 RX ORDER — METHYLPREDNISOLONE SODIUM SUCCINATE 125 MG/2ML
80 INJECTION, POWDER, LYOPHILIZED, FOR SOLUTION INTRAMUSCULAR; INTRAVENOUS ONCE
Status: COMPLETED | OUTPATIENT
Start: 2019-10-10 | End: 2019-10-10

## 2019-10-10 RX ADMIN — METHYLPREDNISOLONE SODIUM SUCCINATE 80 MG: 125 INJECTION, POWDER, LYOPHILIZED, FOR SOLUTION INTRAMUSCULAR; INTRAVENOUS at 18:27

## 2019-10-10 ASSESSMENT — ENCOUNTER SYMPTOMS
NAUSEA: 0
RHINORRHEA: 0
SHORTNESS OF BREATH: 0
TROUBLE SWALLOWING: 0
ABDOMINAL PAIN: 0
DIARRHEA: 0
COLOR CHANGE: 0
WHEEZING: 1
HEARTBURN: 0
EYE PAIN: 0
BACK PAIN: 0
EYE REDNESS: 0
SORE THROAT: 1
COUGH: 1
VOMITING: 0
HEMOPTYSIS: 0
CHEST TIGHTNESS: 0

## 2019-11-05 ENCOUNTER — NURSE ONLY (OUTPATIENT)
Dept: FAMILY MEDICINE CLINIC | Age: 59
End: 2019-11-05
Payer: MEDICARE

## 2019-11-05 DIAGNOSIS — Z23 NEED FOR INFLUENZA VACCINATION: Primary | ICD-10-CM

## 2019-11-05 PROCEDURE — G0008 ADMIN INFLUENZA VIRUS VAC: HCPCS | Performed by: FAMILY MEDICINE

## 2019-11-05 PROCEDURE — 90688 IIV4 VACCINE SPLT 0.5 ML IM: CPT | Performed by: FAMILY MEDICINE

## 2019-11-14 ENCOUNTER — HOSPITAL ENCOUNTER (OUTPATIENT)
Facility: HOSPITAL | Age: 59
Discharge: HOME | End: 2019-11-14
Payer: MEDICARE

## 2019-11-14 ENCOUNTER — OFFICE VISIT (OUTPATIENT)
Dept: FAMILY MEDICINE CLINIC | Age: 59
End: 2019-11-14
Payer: MEDICARE

## 2019-11-14 VITALS
HEIGHT: 71 IN | WEIGHT: 228 LBS | SYSTOLIC BLOOD PRESSURE: 117 MMHG | RESPIRATION RATE: 20 BRPM | OXYGEN SATURATION: 98 % | HEART RATE: 67 BPM | BODY MASS INDEX: 31.92 KG/M2 | DIASTOLIC BLOOD PRESSURE: 70 MMHG

## 2019-11-14 DIAGNOSIS — M19.90 ARTHRITIS: ICD-10-CM

## 2019-11-14 DIAGNOSIS — Z79.4 TYPE 2 DIABETES MELLITUS WITH COMPLICATION, WITH LONG-TERM CURRENT USE OF INSULIN (HCC): Primary | ICD-10-CM

## 2019-11-14 DIAGNOSIS — E03.9 HYPOTHYROIDISM, UNSPECIFIED TYPE: ICD-10-CM

## 2019-11-14 DIAGNOSIS — Z79.4 TYPE 2 DIABETES MELLITUS WITH COMPLICATION, WITH LONG-TERM CURRENT USE OF INSULIN (HCC): ICD-10-CM

## 2019-11-14 DIAGNOSIS — E11.8 TYPE 2 DIABETES MELLITUS WITH COMPLICATION, WITH LONG-TERM CURRENT USE OF INSULIN (HCC): Primary | ICD-10-CM

## 2019-11-14 DIAGNOSIS — E11.8 TYPE 2 DIABETES MELLITUS WITH COMPLICATION, WITH LONG-TERM CURRENT USE OF INSULIN (HCC): ICD-10-CM

## 2019-11-14 DIAGNOSIS — I10 ESSENTIAL HYPERTENSION: ICD-10-CM

## 2019-11-14 PROCEDURE — 80053 COMPREHEN METABOLIC PANEL: CPT

## 2019-11-14 PROCEDURE — 84443 ASSAY THYROID STIM HORMONE: CPT

## 2019-11-14 PROCEDURE — 83735 ASSAY OF MAGNESIUM: CPT

## 2019-11-14 PROCEDURE — 85027 COMPLETE CBC AUTOMATED: CPT

## 2019-11-14 PROCEDURE — 82977 ASSAY OF GGT: CPT

## 2019-11-14 PROCEDURE — 83036 HEMOGLOBIN GLYCOSYLATED A1C: CPT

## 2019-11-14 PROCEDURE — 99214 OFFICE O/P EST MOD 30 MIN: CPT | Performed by: FAMILY MEDICINE

## 2019-11-14 RX ORDER — OXYCODONE AND ACETAMINOPHEN 10; 325 MG/1; MG/1
1 TABLET ORAL EVERY 6 HOURS PRN
Qty: 90 TABLET | Refills: 0 | Status: SHIPPED | OUTPATIENT
Start: 2019-11-14 | End: 2019-12-14

## 2019-11-14 RX ORDER — GABAPENTIN 300 MG/1
CAPSULE ORAL
Qty: 270 CAPSULE | Refills: 0 | Status: SHIPPED | OUTPATIENT
Start: 2019-11-14 | End: 2020-09-08 | Stop reason: SDUPTHER

## 2019-11-14 RX ORDER — OXYCODONE AND ACETAMINOPHEN 10; 325 MG/1; MG/1
1 TABLET ORAL EVERY 6 HOURS PRN
Qty: 90 TABLET | Refills: 0 | Status: SHIPPED | OUTPATIENT
Start: 2019-11-14 | End: 2019-11-14 | Stop reason: SDUPTHER

## 2019-11-14 ASSESSMENT — ENCOUNTER SYMPTOMS: BACK PAIN: 1

## 2019-11-15 LAB
A/G RATIO: 1.2 (ref 0.8–2)
ALBUMIN SERPL-MCNC: 3.8 G/DL (ref 3.4–4.8)
ALP BLD-CCNC: 193 U/L (ref 25–100)
ALT SERPL-CCNC: 28 U/L (ref 4–36)
ANION GAP SERPL CALCULATED.3IONS-SCNC: 11 MMOL/L (ref 3–16)
AST SERPL-CCNC: 31 U/L (ref 8–33)
BILIRUB SERPL-MCNC: 0.7 MG/DL (ref 0.3–1.2)
BUN BLDV-MCNC: 22 MG/DL (ref 6–20)
CALCIUM SERPL-MCNC: 9.5 MG/DL (ref 8.5–10.5)
CHLORIDE BLD-SCNC: 99 MMOL/L (ref 98–107)
CO2: 28 MMOL/L (ref 20–30)
CREAT SERPL-MCNC: 1.7 MG/DL (ref 0.4–1.2)
GFR AFRICAN AMERICAN: 50
GFR NON-AFRICAN AMERICAN: 41
GLOBULIN: 3.1 G/DL
GLUCOSE BLD-MCNC: 235 MG/DL (ref 74–106)
HBA1C MFR BLD: 9.3 %
HCT VFR BLD CALC: 46.3 % (ref 40–54)
HEMOGLOBIN: 14.8 G/DL (ref 13–18)
MAGNESIUM: 2.1 MG/DL (ref 1.7–2.4)
MCH RBC QN AUTO: 28.9 PG (ref 27–32)
MCHC RBC AUTO-ENTMCNC: 32 G/DL (ref 31–35)
MCV RBC AUTO: 90.4 FL (ref 80–100)
PDW BLD-RTO: 15.9 % (ref 11–16)
PLATELET # BLD: 166 K/UL (ref 150–400)
PMV BLD AUTO: 10.4 FL (ref 6–10)
POTASSIUM SERPL-SCNC: 5.7 MMOL/L (ref 3.4–5.1)
RBC # BLD: 5.12 M/UL (ref 4.5–6)
SODIUM BLD-SCNC: 138 MMOL/L (ref 136–145)
TOTAL PROTEIN: 6.9 G/DL (ref 6.4–8.3)
TSH SERPL DL<=0.05 MIU/L-ACNC: 4.17 UIU/ML (ref 0.35–5.5)
WBC # BLD: 9.6 K/UL (ref 4–11)

## 2019-11-16 LAB — GAMMA GLUTAMYL TRANSFERASE: 280 U/L (ref 8–61)

## 2019-12-05 DIAGNOSIS — Z94.4 HISTORY OF LIVER TRANSPLANT (HCC): ICD-10-CM

## 2019-12-05 RX ORDER — TACROLIMUS 1 MG/1
CAPSULE ORAL
Qty: 180 CAPSULE | Refills: 0 | Status: SHIPPED | OUTPATIENT
Start: 2019-12-05 | End: 2020-05-13 | Stop reason: SDUPTHER

## 2020-02-13 ENCOUNTER — OFFICE VISIT (OUTPATIENT)
Dept: FAMILY MEDICINE CLINIC | Age: 60
End: 2020-02-13
Payer: MEDICARE

## 2020-02-13 ENCOUNTER — HOSPITAL ENCOUNTER (OUTPATIENT)
Facility: HOSPITAL | Age: 60
Discharge: HOME | End: 2020-02-13
Payer: MEDICARE

## 2020-02-13 VITALS
DIASTOLIC BLOOD PRESSURE: 74 MMHG | WEIGHT: 228 LBS | HEIGHT: 71 IN | HEART RATE: 84 BPM | OXYGEN SATURATION: 95 % | RESPIRATION RATE: 18 BRPM | BODY MASS INDEX: 31.92 KG/M2 | SYSTOLIC BLOOD PRESSURE: 128 MMHG

## 2020-02-13 PROCEDURE — 83735 ASSAY OF MAGNESIUM: CPT

## 2020-02-13 PROCEDURE — 99214 OFFICE O/P EST MOD 30 MIN: CPT | Performed by: FAMILY MEDICINE

## 2020-02-13 PROCEDURE — 82977 ASSAY OF GGT: CPT

## 2020-02-13 PROCEDURE — 85027 COMPLETE CBC AUTOMATED: CPT

## 2020-02-13 PROCEDURE — 36415 COLL VENOUS BLD VENIPUNCTURE: CPT

## 2020-02-13 PROCEDURE — 80053 COMPREHEN METABOLIC PANEL: CPT

## 2020-02-13 RX ORDER — OXYCODONE AND ACETAMINOPHEN 10; 325 MG/1; MG/1
1 TABLET ORAL EVERY 6 HOURS PRN
Qty: 90 TABLET | Refills: 0 | Status: SHIPPED | OUTPATIENT
Start: 2020-02-13 | End: 2020-02-13 | Stop reason: SDUPTHER

## 2020-02-13 RX ORDER — NITROGLYCERIN 0.4 MG/1
0.4 TABLET SUBLINGUAL EVERY 5 MIN PRN
Qty: 25 TABLET | Refills: 0 | Status: SHIPPED | OUTPATIENT
Start: 2020-02-13 | End: 2022-07-12 | Stop reason: SDUPTHER

## 2020-02-13 RX ORDER — GABAPENTIN 300 MG/1
CAPSULE ORAL
Qty: 270 CAPSULE | Refills: 0 | Status: SHIPPED | OUTPATIENT
Start: 2020-02-13 | End: 2020-05-13 | Stop reason: SDUPTHER

## 2020-02-13 RX ORDER — VITAMIN B COMPLEX
1 CAPSULE ORAL DAILY
COMMUNITY
End: 2021-04-29

## 2020-02-13 RX ORDER — OXYCODONE AND ACETAMINOPHEN 10; 325 MG/1; MG/1
1 TABLET ORAL EVERY 6 HOURS PRN
Qty: 90 TABLET | Refills: 0 | Status: SHIPPED | OUTPATIENT
Start: 2020-02-13 | End: 2020-05-13 | Stop reason: SDUPTHER

## 2020-02-13 ASSESSMENT — PATIENT HEALTH QUESTIONNAIRE - PHQ9
SUM OF ALL RESPONSES TO PHQ QUESTIONS 1-9: 0
2. FEELING DOWN, DEPRESSED OR HOPELESS: 0
SUM OF ALL RESPONSES TO PHQ QUESTIONS 1-9: 0
SUM OF ALL RESPONSES TO PHQ9 QUESTIONS 1 & 2: 0
1. LITTLE INTEREST OR PLEASURE IN DOING THINGS: 0

## 2020-02-13 ASSESSMENT — ENCOUNTER SYMPTOMS: BACK PAIN: 1

## 2020-02-13 NOTE — PROGRESS NOTES
(2/13/2020) 0.3 - 1.2 mg/dL Not in Time Range    Total Protein 7.0 (2/13/2020) 6.4 - 8.3 g/dL Not in Time Range      Hemoglobin A1C (%)   Date Value   11/14/2019 9.3 (H)     Microalbumin, Random Urine (mg/dL)   Date Value   11/15/2018 203.40 (H)     LDL Calculated (mg/dL)   Date Value   08/14/2019 55       Lab Results   Component Value Date    WBC 10.3 02/13/2020    NEUTROABS 7.5 02/19/2018    HGB 15.1 02/13/2020    HCT 47.0 02/13/2020    MCV 88.7 02/13/2020     02/13/2020     Lab Results   Component Value Date    TSH 4.17 11/14/2019       ASSESSMENT:    Diagnosis Orders   1. Type 2 diabetes mellitus with complication, with long-term current use of insulin (HCC)  gabapentin (NEURONTIN) 300 MG capsule   2. Arthritis  oxyCODONE-acetaminophen (PERCOCET)  MG per tablet    DISCONTINUED: oxyCODONE-acetaminophen (PERCOCET)  MG per tablet   3. Peripheral polyneuropathy     4. Essential hypertension          PLAN:  Orders Placed This Encounter   Medications    DISCONTD: oxyCODONE-acetaminophen (PERCOCET)  MG per tablet     Sig: Take 1 tablet by mouth every 6 hours as needed for Pain for up to 30 days. Dispense:  90 tablet     Refill:  0     Reduce doses taken as pain becomes manageable    gabapentin (NEURONTIN) 300 MG capsule     Sig: TAKE ONE CAPSULE BY MOUTH THREE TIMES A DAY FOR LEG AND FOOT PAIN     Dispense:  270 capsule     Refill:  0    nitroGLYCERIN (NITROSTAT) 0.4 MG SL tablet     Sig: Place 1 tablet under the tongue every 5 minutes as needed for Chest pain     Dispense:  25 tablet     Refill:  0    SITagliptin (JANUVIA) 100 MG tablet     Sig: Take 1 tablet by mouth every morning (before breakfast)     Dispense:  30 tablet     Refill:  5    oxyCODONE-acetaminophen (PERCOCET)  MG per tablet     Sig: Take 1 tablet by mouth every 6 hours as needed for Pain for up to 30 days.      Dispense:  90 tablet     Refill:  0     Reduce doses taken as pain becomes manageable Medications Discontinued During This Encounter   Medication Reason    Blood Glucose Monitoring Suppl KODY LIST CLEANUP    glucose blood VI test strips (ACCU-CHEK HONG PLUS) strip LIST CLEANUP    nitroGLYCERIN (NITROSTAT) 0.4 MG SL tablet LIST CLEANUP    ondansetron (ZOFRAN) 4 MG tablet LIST CLEANUP    oxyCODONE-acetaminophen (PERCOCET)  MG per tablet REORDER       Controlled Substances Monitoring: Attestation: The Prescription Monitoring Report for this patient was reviewed today. Mehnaz Bhakta MD)  Periodic Controlled Substance Monitoring: Possible medication side effects, risk of tolerance/dependence & alternative treatments discussed., No signs of potential drug abuse or diversion identified. , Assessed functional status., Obtaining appropriate analgesic effect of treatment. Mehnaz Bhakta MD)    Please note: This chart was generated using Dragon dictation software. Although every effort was made to ensure the accuracy of this automated transcription, some errors in transcription may have occurred.

## 2020-02-13 NOTE — PROGRESS NOTES
Chief Complaint   Patient presents with    Diabetes     follow up       Have you seen any other physician or provider since your last visit yes - Dr Mattie Montejo    Have you had any other diagnostic tests since your last visit? yes - EKG    Have you changed or stopped any medications since your last visit? no     I have recommended that this patient have a sigmoidoscopy but he declines at this time. I have discussed the risks and benefits of this examination with him. The patient verbalizes understanding. Diabetic retinal exam completed this year?  No, patient states that he is scheduling an eye exam.                       * If yes please have patient sign a records release to obtain record to update Health Maintenance                       * If no, please order referral for patient to be scheduled

## 2020-02-14 LAB
A/G RATIO: 1.2 (ref 0.8–2)
ALBUMIN SERPL-MCNC: 3.8 G/DL (ref 3.4–4.8)
ALP BLD-CCNC: 195 U/L (ref 25–100)
ALT SERPL-CCNC: 26 U/L (ref 4–36)
ANION GAP SERPL CALCULATED.3IONS-SCNC: 9 MMOL/L (ref 3–16)
AST SERPL-CCNC: 30 U/L (ref 8–33)
BILIRUB SERPL-MCNC: 0.4 MG/DL (ref 0.3–1.2)
BUN BLDV-MCNC: 19 MG/DL (ref 6–20)
CALCIUM SERPL-MCNC: 9.3 MG/DL (ref 8.5–10.5)
CHLORIDE BLD-SCNC: 96 MMOL/L (ref 98–107)
CO2: 29 MMOL/L (ref 20–30)
CREAT SERPL-MCNC: 1.6 MG/DL (ref 0.4–1.2)
GAMMA GLUTAMYL TRANSFERASE: 233 U/L (ref 8–61)
GFR AFRICAN AMERICAN: 54
GFR NON-AFRICAN AMERICAN: 44
GLOBULIN: 3.2 G/DL
GLUCOSE BLD-MCNC: 263 MG/DL (ref 74–106)
HCT VFR BLD CALC: 47 % (ref 40–54)
HEMOGLOBIN: 15.1 G/DL (ref 13–18)
MAGNESIUM: 2 MG/DL (ref 1.7–2.4)
MCH RBC QN AUTO: 28.5 PG (ref 27–32)
MCHC RBC AUTO-ENTMCNC: 32.1 G/DL (ref 31–35)
MCV RBC AUTO: 88.7 FL (ref 80–100)
PDW BLD-RTO: 14.9 % (ref 11–16)
PLATELET # BLD: 174 K/UL (ref 150–400)
PMV BLD AUTO: 10.7 FL (ref 6–10)
POTASSIUM SERPL-SCNC: 4.9 MMOL/L (ref 3.4–5.1)
RBC # BLD: 5.3 M/UL (ref 4.5–6)
SODIUM BLD-SCNC: 134 MMOL/L (ref 136–145)
TOTAL PROTEIN: 7 G/DL (ref 6.4–8.3)
WBC # BLD: 10.3 K/UL (ref 4–11)

## 2020-02-27 RX ORDER — LEVOTHYROXINE SODIUM 88 UG/1
TABLET ORAL
Qty: 90 TABLET | Refills: 3 | Status: SHIPPED | OUTPATIENT
Start: 2020-02-27 | End: 2020-09-08 | Stop reason: SDUPTHER

## 2020-02-27 NOTE — TELEPHONE ENCOUNTER
Refill request received from pharmacy.  Medication pending for approval.       Last Office Visit With PCP:  2/13/2020    Next Visit Date:  Future Appointments   Date Time Provider Dasha Daley   3/19/2020  2:00 PM Elicia Christensen MD [facility] [place]note 2[place]   5/13/2020  1:45 PM Elicia Christensen MD [address] Up to date

## 2020-05-13 ENCOUNTER — OFFICE VISIT (OUTPATIENT)
Dept: FAMILY MEDICINE CLINIC | Age: 60
End: 2020-05-13
Payer: MEDICARE

## 2020-05-13 ENCOUNTER — HOSPITAL ENCOUNTER (OUTPATIENT)
Facility: HOSPITAL | Age: 60
Discharge: HOME | End: 2020-05-13
Payer: MEDICARE

## 2020-05-13 VITALS
RESPIRATION RATE: 18 BRPM | SYSTOLIC BLOOD PRESSURE: 110 MMHG | OXYGEN SATURATION: 98 % | DIASTOLIC BLOOD PRESSURE: 68 MMHG | HEART RATE: 76 BPM | TEMPERATURE: 98 F | HEIGHT: 71 IN | WEIGHT: 226 LBS | BODY MASS INDEX: 31.64 KG/M2

## 2020-05-13 LAB
A/G RATIO: 1.3 (ref 0.8–2)
ALBUMIN SERPL-MCNC: 4 G/DL (ref 3.4–4.8)
ALP BLD-CCNC: 210 U/L (ref 25–100)
ALT SERPL-CCNC: 28 U/L (ref 4–36)
ANION GAP SERPL CALCULATED.3IONS-SCNC: 11 MMOL/L (ref 3–16)
AST SERPL-CCNC: 31 U/L (ref 8–33)
BILIRUB SERPL-MCNC: 0.5 MG/DL (ref 0.3–1.2)
BUN BLDV-MCNC: 24 MG/DL (ref 6–20)
CALCIUM SERPL-MCNC: 9.3 MG/DL (ref 8.5–10.5)
CHLORIDE BLD-SCNC: 101 MMOL/L (ref 98–107)
CO2: 26 MMOL/L (ref 20–30)
CREAT SERPL-MCNC: 1.8 MG/DL (ref 0.4–1.2)
CREATININE URINE: 150.4 MG/DL (ref 1.5–300)
FOLATE: 9.96 NG/ML
GFR AFRICAN AMERICAN: 47
GFR NON-AFRICAN AMERICAN: 39
GLOBULIN: 3.1 G/DL
GLUCOSE BLD-MCNC: 132 MG/DL (ref 74–106)
HBA1C MFR BLD: 8 %
MICROALBUMIN UR-MCNC: 305.1 MG/DL (ref 0–22)
MICROALBUMIN/CREAT UR-RTO: 2028.6 MG/G (ref 0–30)
POTASSIUM SERPL-SCNC: 5.5 MMOL/L (ref 3.4–5.1)
PROSTATE SPECIFIC ANTIGEN: 0.11 NG/ML (ref 0–4)
SODIUM BLD-SCNC: 138 MMOL/L (ref 136–145)
TOTAL PROTEIN: 7.1 G/DL (ref 6.4–8.3)
TSH SERPL DL<=0.05 MIU/L-ACNC: 3.47 UIU/ML (ref 0.35–5.5)
VITAMIN B-12: 581 PG/ML (ref 211–911)
VITAMIN D 25-HYDROXY: 29.2 (ref 32–100)

## 2020-05-13 PROCEDURE — 83036 HEMOGLOBIN GLYCOSYLATED A1C: CPT

## 2020-05-13 PROCEDURE — 80053 COMPREHEN METABOLIC PANEL: CPT

## 2020-05-13 PROCEDURE — 36415 COLL VENOUS BLD VENIPUNCTURE: CPT

## 2020-05-13 PROCEDURE — 82570 ASSAY OF URINE CREATININE: CPT

## 2020-05-13 PROCEDURE — 3052F HG A1C>EQUAL 8.0%<EQUAL 9.0%: CPT | Performed by: FAMILY MEDICINE

## 2020-05-13 PROCEDURE — 82306 VITAMIN D 25 HYDROXY: CPT

## 2020-05-13 PROCEDURE — 84443 ASSAY THYROID STIM HORMONE: CPT

## 2020-05-13 PROCEDURE — 99214 OFFICE O/P EST MOD 30 MIN: CPT | Performed by: FAMILY MEDICINE

## 2020-05-13 PROCEDURE — G0103 PSA SCREENING: HCPCS

## 2020-05-13 PROCEDURE — 84153 ASSAY OF PSA TOTAL: CPT

## 2020-05-13 PROCEDURE — 82607 VITAMIN B-12: CPT

## 2020-05-13 PROCEDURE — 82043 UR ALBUMIN QUANTITATIVE: CPT

## 2020-05-13 PROCEDURE — 82746 ASSAY OF FOLIC ACID SERUM: CPT

## 2020-05-13 RX ORDER — TRAZODONE HYDROCHLORIDE 150 MG/1
TABLET ORAL
Qty: 90 TABLET | Refills: 3 | Status: SHIPPED | OUTPATIENT
Start: 2020-05-13 | End: 2020-12-08 | Stop reason: SDUPTHER

## 2020-05-13 RX ORDER — OXYCODONE AND ACETAMINOPHEN 10; 325 MG/1; MG/1
1 TABLET ORAL EVERY 6 HOURS PRN
Qty: 90 TABLET | Refills: 0 | Status: SHIPPED | OUTPATIENT
Start: 2020-05-13 | End: 2020-09-08 | Stop reason: SDUPTHER

## 2020-05-13 RX ORDER — GABAPENTIN 300 MG/1
CAPSULE ORAL
Qty: 270 CAPSULE | Refills: 0 | Status: SHIPPED | OUTPATIENT
Start: 2020-05-13 | End: 2020-08-31

## 2020-05-13 RX ORDER — TACROLIMUS 1 MG/1
CAPSULE ORAL
Qty: 180 CAPSULE | Refills: 3 | Status: SHIPPED | OUTPATIENT
Start: 2020-05-13 | End: 2020-12-08 | Stop reason: SDUPTHER

## 2020-05-13 RX ORDER — PRAVASTATIN SODIUM 40 MG
TABLET ORAL
Qty: 90 TABLET | Refills: 3 | Status: SHIPPED | OUTPATIENT
Start: 2020-05-13 | End: 2020-12-08 | Stop reason: SDUPTHER

## 2020-05-13 ASSESSMENT — PATIENT HEALTH QUESTIONNAIRE - PHQ9
1. LITTLE INTEREST OR PLEASURE IN DOING THINGS: 0
SUM OF ALL RESPONSES TO PHQ9 QUESTIONS 1 & 2: 0
2. FEELING DOWN, DEPRESSED OR HOPELESS: 0
SUM OF ALL RESPONSES TO PHQ QUESTIONS 1-9: 0
SUM OF ALL RESPONSES TO PHQ QUESTIONS 1-9: 0

## 2020-05-13 ASSESSMENT — ENCOUNTER SYMPTOMS: BACK PAIN: 1

## 2020-05-13 NOTE — PATIENT INSTRUCTIONS
when they seem to have the same symptoms. What may be good for you may be harmful to others. · If you are no longer taking a prescribed medication and you have pills left please take your pills out of their original containers. Mix crushed pills with an undesirable substance, such as cat litter or used coffee grounds. Put the mixture into a disposable container with a lid, such as an empty margarine tub, or into a sealable bag. Cover up or remove any of your personal information on the empty containers by covering it with black permanent marker or duct tape. Place the sealed container with the mixture, and the empty drug containers, in the trash. · If you use a medication that is in the form of a patch, dispose of used patches by folding them in half so that the sticky sides meet, and then flushing them down a toilet. They should not be placed in the household trash where children or pets can find them. · If you have any questions, ask your provider or pharmacist for more information. · Be sure to keep all appointments for provider visits or tests. ips to Help You Stop Smoking       Cigarette smoking is a preventable cause of death in the [place]. If you have thought about quitting but haven't been able to, here are some reasons why you should and some ways to do it. Here's Why   Quitting smoking now can decrease your risk of getting smoking-related illnesses like:   Heart disease   Stroke   Several types of cancer, including:   Lung   Mouth   Esophagus   Larynx   Bladder   Pancreas   Kidney   Chronic lung diseases:   Bronchitis   Emphysema   Asthma   Cataracts   Macular degeneration   Thyroid conditions   Hearing loss   Erectile dysfunction   Dementia   Osteoporosis   Here's How   Once you've decided to quit smoking, set your target quit date a few weeks away.  In the time leading up to your quit day, try some of these ideas offered by the [address] [place] [address]

## 2020-05-13 NOTE — PROGRESS NOTES
SUBJECTIVE:    Patient ID: Marta Arreola is a 62 y.o. male. Chief Complaint   Patient presents with    Diabetes    Arthritis    Rash     request cream for itching       HPI: office visit  In the office today to follow-up on his diabetes. He struggling quite a bit with keeping his blood sugars down. He struggles with his diet. He struggles with his medicine. He still struggling to mentally deal with the fact that he has to take this medicine all the time. He is having some more arthritic symptoms. He says he has had some dry itchy patches of skin. He has had some irritation and itching. He has not had any obvious fevers blisters or other symptoms. He does continue to complain of some fatigue. He still having some follow-up with his liver transplant team.  Review of Systems   Musculoskeletal: Positive for back pain and gait problem. All other systems reviewed and are negative. OBJECTIVE:  /68   Pulse 76   Temp 98 °F (36.7 °C) (Infrared)   Resp 18   Ht 5' 11\" (1.803 m)   Wt 226 lb (102.5 kg)   SpO2 98% Comment: room air  BMI 31.52 kg/m²    Wt Readings from Last 3 Encounters:   05/13/20 226 lb (102.5 kg)   02/13/20 228 lb (103.4 kg)   11/14/19 228 lb (103.4 kg)     BP Readings from Last 3 Encounters:   05/13/20 110/68   02/13/20 128/74   11/14/19 117/70      Pulse Readings from Last 3 Encounters:   05/13/20 76   02/13/20 84   11/14/19 67     Body mass index is 31.52 kg/m². Resp Readings from Last 3 Encounters:   05/13/20 18   02/13/20 18   11/14/19 20     Past medical, surgical, family and social history were reviewed and updated with the patient. Physical Exam  Vitals signs and nursing note reviewed. Constitutional:       Appearance: He is well-developed. HENT:      Head: Normocephalic and atraumatic. Right Ear: Hearing and external ear normal.      Left Ear: Hearing and external ear normal.      Nose: Nose normal.      Mouth/Throat:      Pharynx: Uvula midline. Eyes:      General: Lids are normal.      Conjunctiva/sclera: Conjunctivae normal.      Pupils: Pupils are equal, round, and reactive to light. Neck:      Musculoskeletal: Normal range of motion and neck supple. Thyroid: No thyroid mass or thyromegaly. Vascular: No carotid bruit. Trachea: Trachea and phonation normal.   Cardiovascular:      Rate and Rhythm: Normal rate and regular rhythm. Pulses: Normal pulses. Heart sounds: Normal heart sounds, S1 normal and S2 normal. No murmur. No friction rub. No gallop. Pulmonary:      Effort: Pulmonary effort is normal.      Breath sounds: Normal breath sounds. Musculoskeletal:      Cervical back: He exhibits decreased range of motion, tenderness, pain and spasm. Lumbar back: He exhibits decreased range of motion, tenderness, pain and spasm. Skin:     General: Skin is warm and dry. Neurological:      Mental Status: He is alert and oriented to person, place, and time. Mental status is at baseline. Psychiatric:         Attention and Perception: Attention and perception normal.         Mood and Affect: Mood and affect normal.         Speech: Speech normal.         Behavior: Behavior is cooperative. Thought Content:  Thought content normal.         Cognition and Memory: Cognition and memory normal.         Judgment: Judgment normal.          Results in Past 30 Days  Result Component Current Result Ref Range Previous Result Ref Range   Alb 4.0 (5/13/2020) 3.4 - 4.8 g/dL Not in Time Range    Albumin/Globulin Ratio 1.3 (5/13/2020) 0.8 - 2.0 Not in Time Range    Alkaline Phosphatase 210 (H) (5/13/2020) 25 - 100 U/L Not in Time Range    ALT 28 (5/13/2020) 4 - 36 U/L Not in Time Range    AST 31 (5/13/2020) 8 - 33 U/L Not in Time Range    BUN 24 (H) (5/13/2020) 6 - 20 mg/dL Not in Time Range    Calcium 9.3 (5/13/2020) 8.5 - 10.5 mg/dL Not in Time Range    Chloride 101 (5/13/2020) 98 - 107 mmol/L Not in Time Range    CO2 26 (5/13/2020)

## 2020-08-03 RX ORDER — AMOXICILLIN AND CLAVULANATE POTASSIUM 875; 125 MG/1; MG/1
1 TABLET, FILM COATED ORAL 2 TIMES DAILY
Qty: 20 TABLET | Refills: 0 | Status: SHIPPED | OUTPATIENT
Start: 2020-08-03 | End: 2020-08-13

## 2020-08-03 RX ORDER — FLUTICASONE PROPIONATE 50 MCG
2 SPRAY, SUSPENSION (ML) NASAL DAILY
Qty: 3 BOTTLE | Refills: 1 | Status: SHIPPED | OUTPATIENT
Start: 2020-08-03 | End: 2021-03-09 | Stop reason: SDUPTHER

## 2020-08-03 RX ORDER — METHYLPREDNISOLONE 4 MG/1
TABLET ORAL
Qty: 21 TABLET | Refills: 0 | Status: SHIPPED | OUTPATIENT
Start: 2020-08-03 | End: 2020-09-08

## 2020-08-31 NOTE — TELEPHONE ENCOUNTER
Refill request received from pharmacy.  Medication pending for approval.       Last Office Visit With PCP:  5/13/2020    Next Visit Date:  Future Appointments   Date Time Provider Dasha Daley   9/8/2020  3:15 PM Ángela Salgado MD [address] [place] [address]   9/30/2020  1:15 PM Ángela Salgado MD [address] [place] [address] for review

## 2020-09-02 RX ORDER — GABAPENTIN 300 MG/1
CAPSULE ORAL
Qty: 270 CAPSULE | Refills: 0 | Status: SHIPPED | OUTPATIENT
Start: 2020-09-02 | End: 2020-09-08

## 2020-09-03 NOTE — PROGRESS NOTES
Health Maintenance Due This Visit   Colonoscopy Yes   Mammogram No   Annual Wellness Visit Yes   Microalbumin No   HgbA1C No   Diabetic Eye Exam Yes    House Bill One Due This Visit   MELLO Yes   UDS No   Contract No    Chief Complaint   Patient presents with    Diabetes     f/u       Have you seen any other physician or provider since your last visit yes -  noe      Have you had any other diagnostic tests since your last visit? no    Have you changed or stopped any medications since your last visit? no

## 2020-09-08 ENCOUNTER — OFFICE VISIT (OUTPATIENT)
Dept: FAMILY MEDICINE CLINIC | Age: 60
End: 2020-09-08
Payer: MEDICARE

## 2020-09-08 ENCOUNTER — HOSPITAL ENCOUNTER (OUTPATIENT)
Facility: HOSPITAL | Age: 60
Discharge: HOME | End: 2020-09-08
Payer: MEDICARE

## 2020-09-08 VITALS
HEART RATE: 76 BPM | OXYGEN SATURATION: 99 % | BODY MASS INDEX: 32.06 KG/M2 | WEIGHT: 229 LBS | HEIGHT: 71 IN | SYSTOLIC BLOOD PRESSURE: 126 MMHG | DIASTOLIC BLOOD PRESSURE: 62 MMHG | RESPIRATION RATE: 18 BRPM

## 2020-09-08 PROCEDURE — 99214 OFFICE O/P EST MOD 30 MIN: CPT | Performed by: FAMILY MEDICINE

## 2020-09-08 PROCEDURE — 85027 COMPLETE CBC AUTOMATED: CPT

## 2020-09-08 PROCEDURE — 82977 ASSAY OF GGT: CPT

## 2020-09-08 PROCEDURE — 83735 ASSAY OF MAGNESIUM: CPT

## 2020-09-08 PROCEDURE — 80053 COMPREHEN METABOLIC PANEL: CPT

## 2020-09-08 PROCEDURE — 83036 HEMOGLOBIN GLYCOSYLATED A1C: CPT

## 2020-09-08 RX ORDER — LEVOTHYROXINE SODIUM 88 UG/1
TABLET ORAL
Qty: 90 TABLET | Refills: 3 | Status: SHIPPED | OUTPATIENT
Start: 2020-09-08 | End: 2021-03-09 | Stop reason: SDUPTHER

## 2020-09-08 RX ORDER — GABAPENTIN 300 MG/1
CAPSULE ORAL
Qty: 270 CAPSULE | Refills: 0 | Status: SHIPPED | OUTPATIENT
Start: 2020-09-08 | End: 2021-03-09 | Stop reason: SDUPTHER

## 2020-09-08 RX ORDER — CLONAZEPAM 1 MG/1
1 TABLET ORAL 2 TIMES DAILY PRN
Qty: 60 TABLET | Refills: 2 | Status: SHIPPED | OUTPATIENT
Start: 2020-09-08 | End: 2021-03-09 | Stop reason: SDUPTHER

## 2020-09-08 RX ORDER — OXYCODONE AND ACETAMINOPHEN 10; 325 MG/1; MG/1
1 TABLET ORAL EVERY 6 HOURS PRN
Qty: 90 TABLET | Refills: 0 | Status: SHIPPED | OUTPATIENT
Start: 2020-09-08 | End: 2020-12-08 | Stop reason: SDUPTHER

## 2020-09-08 ASSESSMENT — PATIENT HEALTH QUESTIONNAIRE - PHQ9
SUM OF ALL RESPONSES TO PHQ QUESTIONS 1-9: 0
SUM OF ALL RESPONSES TO PHQ QUESTIONS 1-9: 0
SUM OF ALL RESPONSES TO PHQ9 QUESTIONS 1 & 2: 0
1. LITTLE INTEREST OR PLEASURE IN DOING THINGS: 0
2. FEELING DOWN, DEPRESSED OR HOPELESS: 0

## 2020-09-08 NOTE — PROGRESS NOTES
Conjunctivae normal.      Pupils: Pupils are equal, round, and reactive to light. Neck:      Musculoskeletal: Normal range of motion and neck supple. Thyroid: No thyroid mass or thyromegaly. Vascular: No carotid bruit. Trachea: Trachea and phonation normal.   Cardiovascular:      Rate and Rhythm: Normal rate and regular rhythm. Pulses: Normal pulses. Heart sounds: Normal heart sounds, S1 normal and S2 normal. No murmur. No friction rub. No gallop. Pulmonary:      Effort: Pulmonary effort is normal.      Breath sounds: Normal breath sounds. Musculoskeletal:      Cervical back: He exhibits decreased range of motion, tenderness, pain and spasm. Lumbar back: He exhibits decreased range of motion, tenderness, pain and spasm. Skin:     General: Skin is warm and dry. Neurological:      Mental Status: He is alert and oriented to person, place, and time. Mental status is at baseline. Psychiatric:         Attention and Perception: Attention and perception normal.         Mood and Affect: Mood and affect normal.         Speech: Speech normal.         Behavior: Behavior is cooperative. Thought Content:  Thought content normal.         Cognition and Memory: Cognition and memory normal.         Judgment: Judgment normal.          Results in Past 30 Days  Result Component Current Result Ref Range Previous Result Ref Range   Alb 4.0 (9/8/2020) 3.4 - 4.8 g/dL Not in Time Range    Albumin/Globulin Ratio 1.3 (9/8/2020) 0.8 - 2.0 Not in Time Range    Alkaline Phosphatase 205 (H) (9/8/2020) 25 - 100 U/L Not in Time Range    ALT 32 (9/8/2020) 4 - 36 U/L Not in Time Range    AST 26 (9/8/2020) 8 - 33 U/L Not in Time Range    BUN 35 (H) (9/8/2020) 6 - 20 mg/dL Not in Time Range    Calcium 9.7 (9/8/2020) 8.5 - 10.5 mg/dL Not in Time Range    Chloride 98 (9/8/2020) 98 - 107 mmol/L Not in Time Range    CO2 27 (9/8/2020) 20 - 30 mmol/L Not in Time Range    CREATININE 2.1 (H) (9/8/2020) 0.4 - 1.2 mg/dL Not in Time Range    GFR  39 (L) (9/8/2020) >59 Not in Time Range    GFR Non- 32 (L) (9/8/2020) >59 Not in Time Range    Globulin 3.2 (9/8/2020) g/dL Not in Time Range    Glucose 225 (H) (9/8/2020) 74 - 106 mg/dL Not in Time Range    Potassium 5.0 (9/8/2020) 3.4 - 5.1 mmol/L Not in Time Range    Sodium 137 (9/8/2020) 136 - 145 mmol/L Not in Time Range    Total Bilirubin 0.5 (9/8/2020) 0.3 - 1.2 mg/dL Not in Time Range    Total Protein 7.2 (9/8/2020) 6.4 - 8.3 g/dL Not in Time Range      Hemoglobin A1C (%)   Date Value   09/08/2020 9.2 (H)     Microalbumin, Random Urine (mg/dL)   Date Value   05/13/2020 305.10 (H)     LDL Calculated (mg/dL)   Date Value   08/14/2019 55       Lab Results   Component Value Date    WBC 10.6 09/08/2020    NEUTROABS 7.5 02/19/2018    HGB 15.0 09/08/2020    HCT 48.0 09/08/2020    MCV 91.3 09/08/2020     09/08/2020     Lab Results   Component Value Date    TSH 3.47 05/13/2020       ASSESSMENT:    Diagnosis Orders   1. Type 2 diabetes mellitus with complication, with long-term current use of insulin (HCC)  HM DIABETES FOOT EXAM    gabapentin (NEURONTIN) 300 MG capsule    COMPREHENSIVE METABOLIC PANEL    HEMOGLOBIN A1C   2. Arthritis  oxyCODONE-acetaminophen (PERCOCET)  MG per tablet   3. Anxiety  clonazePAM (KLONOPIN) 1 MG tablet   4. Screening for colon cancer  Cologuard (For External Results Only)   5. Essential hypertension     6. Hypothyroidism, unspecified type          PLAN:  Orders Placed This Encounter   Medications    oxyCODONE-acetaminophen (PERCOCET)  MG per tablet     Sig: Take 1 tablet by mouth every 6 hours as needed for Pain for up to 30 days.      Dispense:  90 tablet     Refill:  0     Reduce doses taken as pain becomes manageable    levothyroxine (SYNTHROID) 88 MCG tablet     Sig: TAKE ONE TABLET BY MOUTH DAILY WITH WATER ON AN EMPTY STOMACH, WAIT 30 MINUTES BEFORE EATING OR TAKING OTHER MEDS     Dispense:  90 tablet Refill:  3    gabapentin (NEURONTIN) 300 MG capsule     Sig: TAKE ONE CAPSULE BY MOUTH THREE TIMES A DAY FOR LEG AND FOOT PAIN     Dispense:  270 capsule     Refill:  0    clonazePAM (KLONOPIN) 1 MG tablet     Sig: Take 1 tablet by mouth 2 times daily as needed for Anxiety for up to 30 days. Dispense:  60 tablet     Refill:  2        Medications Discontinued During This Encounter   Medication Reason    gabapentin (NEURONTIN) 300 MG capsule LIST CLEANUP    methylPREDNISolone (MEDROL DOSEPACK) 4 MG tablet LIST CLEANUP    oxyCODONE-acetaminophen (PERCOCET)  MG per tablet REORDER    levothyroxine (SYNTHROID) 88 MCG tablet REORDER    gabapentin (NEURONTIN) 300 MG capsule REORDER    clonazePAM (KLONOPIN) 1 MG tablet REORDER       Controlled Substances Monitoring:      Please note: This chart was generated using Dragon dictation software. Although every effort was made to ensure the accuracy of this automated transcription, some errors in transcription may have occurred.

## 2020-09-09 LAB
A/G RATIO: 1.3 (ref 0.8–2)
ALBUMIN SERPL-MCNC: 4 G/DL (ref 3.4–4.8)
ALP BLD-CCNC: 205 U/L (ref 25–100)
ALT SERPL-CCNC: 32 U/L (ref 4–36)
ANION GAP SERPL CALCULATED.3IONS-SCNC: 12 MMOL/L (ref 3–16)
AST SERPL-CCNC: 26 U/L (ref 8–33)
BILIRUB SERPL-MCNC: 0.5 MG/DL (ref 0.3–1.2)
BUN BLDV-MCNC: 35 MG/DL (ref 6–20)
CALCIUM SERPL-MCNC: 9.7 MG/DL (ref 8.5–10.5)
CHLORIDE BLD-SCNC: 98 MMOL/L (ref 98–107)
CO2: 27 MMOL/L (ref 20–30)
CREAT SERPL-MCNC: 2.1 MG/DL (ref 0.4–1.2)
GAMMA GLUTAMYL TRANSFERASE: 240 U/L (ref 8–61)
GFR AFRICAN AMERICAN: 39
GFR NON-AFRICAN AMERICAN: 32
GLOBULIN: 3.2 G/DL
GLUCOSE BLD-MCNC: 225 MG/DL (ref 74–106)
HBA1C MFR BLD: 9.2 %
HCT VFR BLD CALC: 48 % (ref 40–54)
HEMOGLOBIN: 15 G/DL (ref 13–18)
MAGNESIUM: 2.2 MG/DL (ref 1.7–2.4)
MCH RBC QN AUTO: 28.5 PG (ref 27–32)
MCHC RBC AUTO-ENTMCNC: 31.3 G/DL (ref 31–35)
MCV RBC AUTO: 91.3 FL (ref 80–100)
PDW BLD-RTO: 15.7 % (ref 11–16)
PLATELET # BLD: 172 K/UL (ref 150–400)
PMV BLD AUTO: 10.2 FL (ref 6–10)
POTASSIUM SERPL-SCNC: 5 MMOL/L (ref 3.4–5.1)
RBC # BLD: 5.26 M/UL (ref 4.5–6)
SODIUM BLD-SCNC: 137 MMOL/L (ref 136–145)
TOTAL PROTEIN: 7.2 G/DL (ref 6.4–8.3)
WBC # BLD: 10.6 K/UL (ref 4–11)

## 2020-09-24 ENCOUNTER — TELEPHONE (OUTPATIENT)
Dept: FAMILY MEDICINE CLINIC | Age: 60
End: 2020-09-24

## 2020-09-26 ASSESSMENT — ENCOUNTER SYMPTOMS: BACK PAIN: 1

## 2020-09-28 NOTE — PROGRESS NOTES
Chief Complaint   Patient presents with    Medicare AWV       Have you seen any other physician or provider since your last visit no    Have you had any other diagnostic tests since your last visit? no    Have you changed or stopped any medications since your last visit? no     Diabetic retinal exam completed this year? No-refused to let me place referral, says he will make his own appt.                        * If yes please have patient sign a records release to obtain record to update Health Maintenance                       * If no, please order referral for patient to be scheduled         Health Maintenance Due This Visit   Colonoscopy No   Mammogram No   Annual Wellness Visit Yes   Microalbumin No   HgbA1C No   Diabetic Eye Exam Yes    House Bill One Due This Visit   MELLO No   UDS Yes   Contract No

## 2020-09-30 ENCOUNTER — OFFICE VISIT (OUTPATIENT)
Dept: FAMILY MEDICINE CLINIC | Age: 60
End: 2020-09-30
Payer: MEDICARE

## 2020-09-30 ENCOUNTER — HOSPITAL ENCOUNTER (OUTPATIENT)
Facility: HOSPITAL | Age: 60
Discharge: HOME | End: 2020-09-30
Payer: MEDICARE

## 2020-09-30 VITALS
HEART RATE: 75 BPM | DIASTOLIC BLOOD PRESSURE: 72 MMHG | TEMPERATURE: 97.4 F | SYSTOLIC BLOOD PRESSURE: 114 MMHG | WEIGHT: 225.9 LBS | HEIGHT: 71 IN | OXYGEN SATURATION: 98 % | BODY MASS INDEX: 31.63 KG/M2 | RESPIRATION RATE: 18 BRPM

## 2020-09-30 PROCEDURE — 90688 IIV4 VACCINE SPLT 0.5 ML IM: CPT | Performed by: FAMILY MEDICINE

## 2020-09-30 PROCEDURE — 82977 ASSAY OF GGT: CPT

## 2020-09-30 PROCEDURE — G0008 ADMIN INFLUENZA VIRUS VAC: HCPCS | Performed by: FAMILY MEDICINE

## 2020-09-30 PROCEDURE — 36415 COLL VENOUS BLD VENIPUNCTURE: CPT

## 2020-09-30 PROCEDURE — 80053 COMPREHEN METABOLIC PANEL: CPT

## 2020-09-30 PROCEDURE — 83735 ASSAY OF MAGNESIUM: CPT

## 2020-09-30 PROCEDURE — G0438 PPPS, INITIAL VISIT: HCPCS | Performed by: FAMILY MEDICINE

## 2020-09-30 PROCEDURE — 85027 COMPLETE CBC AUTOMATED: CPT

## 2020-09-30 ASSESSMENT — LIFESTYLE VARIABLES: HOW OFTEN DO YOU HAVE A DRINK CONTAINING ALCOHOL: 0

## 2020-09-30 ASSESSMENT — PATIENT HEALTH QUESTIONNAIRE - PHQ9
1. LITTLE INTEREST OR PLEASURE IN DOING THINGS: 0
2. FEELING DOWN, DEPRESSED OR HOPELESS: 0
SUM OF ALL RESPONSES TO PHQ QUESTIONS 1-9: 0
SUM OF ALL RESPONSES TO PHQ QUESTIONS 1-9: 0
SUM OF ALL RESPONSES TO PHQ9 QUESTIONS 1 & 2: 0

## 2020-09-30 NOTE — PROGRESS NOTES
Vaccine Information Sheet, \"Influenza - Inactivated\"  given to Mario Apley, or parent/legal guardian of  Mario Apley and verbalized understanding. Patient responses:    Do you have any current illness?  no  Have you ever had Guillian Houston Syndrome?  no  Do you have a serious allergy to any of the follow: Neomycin, Polymyxin, Thimerosal, eggs or egg products? no    Flu vaccine given per order. Please see immunization tab. Risks and benefits explained. Current VIS given. Immunizations Administered     Name Date Dose Route    Influenza, Quadv, IM, (6 mo and older Fluzone, Flulaval, Fluarix and 3 yrs and older Afluria) 9/30/2020 0.5 mL Intramuscular    Site: Deltoid- Left    Lot: [id number]    NDC: 50117-421-26          Patient tolerated injection well. Patient advised to wait 20 minutes in the office following the injection. No signs/symptoms of reaction noted after 20 minutes.

## 2020-09-30 NOTE — PROGRESS NOTES
Medicare Annual Wellness Visit  Name: Zayda Santos Date: 2020   MRN: [id number] Sex: Male   Age: 62 y.o. Ethnicity: Non-/Non    : 1960 Race: Lien Mai is here for Medicare AWV    Screenings for behavioral, psychosocial and functional/safety risks, and cognitive dysfunction are all negative except as indicated below. These results, as well as other patient data from the [address] form, are documented in Flowsheets linked to this Encounter. No Known Allergies      Prior to Visit Medications    Medication Sig Taking? Authorizing Provider   oxyCODONE-acetaminophen (PERCOCET)  MG per tablet Take 1 tablet by mouth every 6 hours as needed for Pain for up to 30 days. Yes Rachel Ramachandran MD   levothyroxine (SYNTHROID) 88 MCG tablet TAKE ONE TABLET BY MOUTH DAILY WITH WATER ON AN EMPTY STOMACH, WAIT 30 MINUTES BEFORE EATING OR TAKING OTHER MEDS Yes Rachel Ramachandran MD   gabapentin (NEURONTIN) 300 MG capsule TAKE ONE CAPSULE BY MOUTH THREE TIMES A DAY FOR LEG AND FOOT PAIN Yes Rachel Ramachandran MD   clonazePAM (KLONOPIN) 1 MG tablet Take 1 tablet by mouth 2 times daily as needed for Anxiety for up to 30 days.  Yes Rachel Ramachandran MD   fluticasone (FLONASE) 50 MCG/ACT nasal spray 2 sprays by Each Nostril route daily Yes DELMY Burton CNP   tacrolimus (PROGRAF) 1 MG capsule TAKE ONE CAPSULE BY MOUTH TWICE A DAY Yes Rachel Ramachandran MD   Cetirizine HCl 10 MG TBDP Take 1 tablet by mouth daily Yes Rachel Ramachandran MD   metoprolol tartrate (LOPRESSOR) 25 MG tablet TAKE ONE TABLET BY MOUTH DAILY Yes Rachel Ramachandran MD   traZODone (DESYREL) 150 MG tablet TAKE ONE TABLET BY MOUTH ONCE NIGHTLY Yes Rachel Ramachandran MD   pravastatin (PRAVACHOL) 40 MG tablet TAKE ONE TABLET BY MOUTH DAILY Yes Rachel Ramachandran MD   SITagliptin (JANUVIA) 100 MG tablet Take 1 tablet by mouth every morning (before breakfast) Yes Rachel Ramachandran MD   Insulin Glargine, 2 Unit Dial, 300 UNIT/ML SOPN Inject 50 Units into the skin daily Yes Ramon Moss MD   nitroGLYCERIN (NITROSTAT) 0.4 MG SL tablet Place 1 tablet under the tongue every 5 minutes as needed for Chest pain Yes Ramon Moss MD   b complex vitamins capsule Take 1 capsule by mouth daily Yes Historical Provider, MD RENTERIA RESPICLICK 645 (90 Base) MCG/ACT aerosol powder inhalation Inhale 200 puffs into the lungs 2 times daily  Patient taking differently: Inhale 2 puffs into the lungs 2 times daily  Yes Ramon Moss MD   aspirin 325 MG tablet Take 325 mg by mouth daily. Yes Historical Provider, MD   Vitamin D (CHOLECALCIFEROL) 1000 UNITS CAPS capsule Take 1,000 Units by mouth daily.    Yes Historical Provider, MD   metFORMIN (GLUCOPHAGE) 1000 MG tablet TAKE ONE TABLET BY MOUTH TWICE A DAY WITH MEALS  Patient not taking: Reported on 9/30/2020  Ramon Moss MD         Past Medical History:   Diagnosis Date    [facility]     Crohn disease ([facility]     Osteoarthritis     Vision abnormalities        Past Surgical History:   Procedure Laterality Date    CORONARY ANGIOPLASTY      LIVER TRANSPLANT           Family History   Problem Relation Age of Onset    Vision Loss Mother     Diabetes Father     Heart Disease Father     High Blood Pressure Father     High Cholesterol Father     Early Death Brother     Vision Loss Brother     Depression Maternal Aunt     Arthritis Maternal Grandmother     Arthritis Maternal Grandfather     Vision Loss Maternal Grandfather     Arthritis Paternal Grandmother     Diabetes Paternal Grandmother     High Blood Pressure Paternal Grandmother     Arthritis Paternal Grandfather     Cancer Paternal Grandfather     Miscarriages / [place] Daughter     Vision Loss Daughter        CareTeam (Including outside providers/suppliers regularly involved in providing care):   Patient Care Team:  Ramon Moss MD as PCP - General  Ramon Moss MD as [facility] Empaneled Provider    Wt Readings from Last 3 Encounters:   09/30/20 225 lb 14.4 oz (102.5 kg)   09/08/20 229 lb (103.9 kg)   05/13/20 226 lb (102.5 kg)     Vitals:    09/30/20 1320   BP: 114/72   Pulse: 75   Resp: 18   Temp: 97.4 °F (36.3 °C)   TempSrc: Temporal   SpO2: 98%   Weight: 225 lb 14.4 oz (102.5 kg)   Height: 5' 11\" (1.803 m)     Body mass index is 31.51 kg/m². Based upon direct observation of the patient, evaluation of cognition reveals no issues         Patient's complete Health Risk Assessment and screening values have been reviewed and are found in Flowsheets. The following problems were reviewed today and where indicated follow up appointments were made and/or referrals ordered. Positive Risk Factor Screenings with Interventions:     Fall Risk:  Timed Up and Go Test > 12 seconds? (Complete if either Fall Risk answers are Yes): no  2 or more falls in past year?: (!) yes  Fall with injury in past year?: no  Fall Risk Interventions:    · Discussed fall risk issues at home. Substance History:  Social History     Tobacco History     Smoking Status  Current Every Day Smoker Smoking Start Date  1/1/1976 Smoking Frequency  1 pack/day for 45 years (39 pk yrs) Smoking Tobacco Type  Cigarettes    Smokeless Tobacco Use  Never Used    Tobacco Comment  pt states has not smoked in 2 weeks           Alcohol History     Alcohol Use Status  No          Drug Use     Drug Use Status  No          Sexual Activity     Sexually Active  Not Asked               Alcohol Screening:       A score of 8 or more is associated with harmful or hazardous drinking. A score of 13 or more in women, and 15 or more in men, is likely to indicate alcohol dependence. Substance Abuse Interventions:  · No issues    General Health:  General  In general, how would you say your health is?: Good  In the past 7 days, have you experienced any of the following?  New or Increased Pain, New or Increased Fatigue, Loneliness, Social Isolation, Stress or Anger?: (!) New or Increased Pain  Do you get the social and emotional support that you need?: Yes  Do you have a Living Will?: (!) No  General Health Risk Interventions:  · Encouraged him to make a living will    Health Habits/Nutrition:  Health Habits/Nutrition  Do you exercise for at least 20 minutes 2-3 times per week?: (!) No  Have you lost any weight without trying in the past 3 months?: No  Do you eat fewer than 2 meals per day?: (!) Yes  Have you seen a dentist within the past year?: (!) No  Body mass index is 31.51 kg/m². Health Habits/Nutrition Interventions:  · Discussed the fact that he needs to see dentist yearly    Hearing/Vision:  No exam data present  Hearing/Vision  Do you or your family notice any trouble with your hearing?: No  Do you have difficulty driving, watching TV, or doing any of your daily activities because of your eyesight?: (!) Yes  Have you had an eye exam within the past year?: (!) No  Hearing/Vision Interventions:  · Encouraged him to continue to have yearly vision checks. Safety:  Safety  Do you have working smoke detectors?: Yes  Have all throw rugs been removed or fastened?: Yes  Do you have non-slip mats or surfaces in all bathtubs/showers?: (!) No  Do all of your stairways have a railing or banister?: (!) No  Are your doorways, halls and stairs free of clutter?: Yes  Do you always fasten your seatbelt when you are in a car?: (!) No  Safety Interventions:  · Discussed the fact that he needs handrails at home. ADL:  ADLs  In the past 7 days, did you need help from others to perform any of the following everyday activities? Eating, dressing, grooming, bathing, toileting, or walking/balance?: None  In the past 7 days, did you need help from others to take care of any of the following?  Laundry, housekeeping, banking/finances, shopping, telephone use, food preparation, transportation, or taking medications?: [employer], Food Preparation, Transportation  ADL Interventions:  · He has help at home from

## 2020-09-30 NOTE — PATIENT INSTRUCTIONS
or into a sealable bag. Cover up or remove any of your personal information on the empty containers by covering it with black permanent marker or duct tape. Place the sealed container with the mixture, and the empty drug containers, in the trash. · If you use a medication that is in the form of a patch, dispose of used patches by folding them in half so that the sticky sides meet, and then flushing them down a toilet. They should not be placed in the household trash where children or pets can find them. · If you have any questions, ask your provider or pharmacist for more information. · Be sure to keep all appointments for provider visits or tests. We are committed to providing you with the best care possible. In order to help us achieve these goals please remember to bring all medications, herbal products, and over the counter supplements with you to each visit. If your provider has ordered testing for you, please be sure to follow up with our office if you have not received results within 7 days after the testing took place. *If you receive a survey after visiting one of our offices, please take time to share your experience concerning your physician office visit. These surveys are confidential and no health information about you is shared. We are eager to improve for you and we are counting on your feedback to help make that happen. Personalized Preventive Plan for Marco A Bailey - 9/30/2020  Medicare offers a range of preventive health benefits. Some of the tests and screenings are paid in full while other may be subject to a deductible, co-insurance, and/or copay. Some of these benefits include a comprehensive review of your medical history including lifestyle, illnesses that may run in your family, and various assessments and screenings as appropriate.     After reviewing your medical record and screening and assessments performed today your provider may have ordered immunizations, labs, imaging, and/or referrals for you. A list of these orders (if applicable) as well as your Preventive Care list are included within your After Visit Summary for your review. Other Preventive Recommendations:    A preventive eye exam performed by an eye specialist is recommended every 1-2 years to screen for glaucoma; cataracts, macular degeneration, and other eye disorders. A preventive dental visit is recommended every 6 months. Try to get at least 150 minutes of exercise per week or 10,000 steps per day on a pedometer . Order or download the FREE \"Exercise & Physical Activity: Your Everyday Guide\" from [employer]. Call 0-815.941.1638 or search [employer] online. You need 6849-2573 mg of calcium and 5193-1345 IU of vitamin D per day. It is possible to meet your calcium requirement with diet alone, but a vitamin D supplement is usually necessary to meet this goal.  When exposed to the sun, use a sunscreen that protects against both UVA and UVB radiation with an SPF of 30 or greater. Reapply every 2 to 3 hours or after sweating, drying off with a towel, or swimming. Always wear a seat belt when traveling in a car. Always wear a helmet when riding a bicycle or motorcycle. Personalized Preventive Plan for [place] - 9/30/2020  Medicare offers a range of preventive health benefits. Some of the tests and screenings are paid in full while other may be subject to a deductible, co-insurance, and/or copay. Some of these benefits include a comprehensive review of your medical history including lifestyle, illnesses that may run in your family, and various assessments and screenings as appropriate. After reviewing your medical record and screening and assessments performed today your provider may have ordered immunizations, labs, imaging, and/or referrals for you.   A list of these orders (if applicable) as well as your Preventive Care list are included within your After Visit Summary for your review. Other Preventive Recommendations:    A preventive eye exam performed by an eye specialist is recommended every 1-2 years to screen for glaucoma; cataracts, macular degeneration, and other eye disorders. A preventive dental visit is recommended every 6 months. Try to get at least 150 minutes of exercise per week or 10,000 steps per day on a pedometer . Order or download the FREE \"Exercise & Physical Activity: Your Everyday Guide\" from [employer]. Call 9-824.675.6248 or search [employer] online. You need 6286-6438 mg of calcium and 5718-4016 IU of vitamin D per day. It is possible to meet your calcium requirement with diet alone, but a vitamin D supplement is usually necessary to meet this goal.  When exposed to the sun, use a sunscreen that protects against both UVA and UVB radiation with an SPF of 30 or greater. Reapply every 2 to 3 hours or after sweating, drying off with a towel, or swimming. Always wear a seat belt when traveling in a car. Always wear a helmet when riding a bicycle or motorcycle.

## 2020-10-01 LAB
A/G RATIO: 1.4 (ref 0.8–2)
ALBUMIN SERPL-MCNC: 4.1 G/DL (ref 3.4–4.8)
ALP BLD-CCNC: 198 U/L (ref 25–100)
ALT SERPL-CCNC: 27 U/L (ref 4–36)
ANION GAP SERPL CALCULATED.3IONS-SCNC: 9 MMOL/L (ref 3–16)
AST SERPL-CCNC: 27 U/L (ref 8–33)
BILIRUB SERPL-MCNC: 0.7 MG/DL (ref 0.3–1.2)
BUN BLDV-MCNC: 26 MG/DL (ref 6–20)
CALCIUM SERPL-MCNC: 9.5 MG/DL (ref 8.5–10.5)
CHLORIDE BLD-SCNC: 99 MMOL/L (ref 98–107)
CO2: 28 MMOL/L (ref 20–30)
CREAT SERPL-MCNC: 1.7 MG/DL (ref 0.4–1.2)
GAMMA GLUTAMYL TRANSFERASE: 227 U/L (ref 8–61)
GFR AFRICAN AMERICAN: 50
GFR NON-AFRICAN AMERICAN: 41
GLOBULIN: 3 G/DL
GLUCOSE BLD-MCNC: 199 MG/DL (ref 74–106)
HCT VFR BLD CALC: 47.7 % (ref 40–54)
HEMOGLOBIN: 15.2 G/DL (ref 13–18)
MAGNESIUM: 2.1 MG/DL (ref 1.7–2.4)
MCH RBC QN AUTO: 28.8 PG (ref 27–32)
MCHC RBC AUTO-ENTMCNC: 31.9 G/DL (ref 31–35)
MCV RBC AUTO: 90.5 FL (ref 80–100)
PDW BLD-RTO: 15.5 % (ref 11–16)
PLATELET # BLD: 149 K/UL (ref 150–400)
PMV BLD AUTO: 10.7 FL (ref 6–10)
POTASSIUM SERPL-SCNC: 5.2 MMOL/L (ref 3.4–5.1)
RBC # BLD: 5.27 M/UL (ref 4.5–6)
SODIUM BLD-SCNC: 136 MMOL/L (ref 136–145)
TOTAL PROTEIN: 7.1 G/DL (ref 6.4–8.3)
WBC # BLD: 9.7 K/UL (ref 4–11)

## 2020-10-09 ENCOUNTER — HOSPITAL ENCOUNTER (OUTPATIENT)
Facility: HOSPITAL | Age: 60
Discharge: HOME | End: 2020-10-09
Payer: MEDICARE

## 2020-10-09 PROCEDURE — 87077 CULTURE AEROBIC IDENTIFY: CPT

## 2020-10-09 PROCEDURE — 87086 URINE CULTURE/COLONY COUNT: CPT

## 2020-10-09 NOTE — TELEPHONE ENCOUNTER
Patient scheduled to see Dr. Roselia Choi ([facility]) on 11/13/20 at 3:15. Patient's referral, along with all pertinent medical records faxed to the attention of scheduling on 09/24/20. I have mailed patient referral with appointment date/time/location.

## 2020-10-11 LAB
ORGANISM: ABNORMAL
URINE CULTURE, ROUTINE: ABNORMAL

## 2020-12-08 ENCOUNTER — TELEPHONE (OUTPATIENT)
Dept: FAMILY MEDICINE CLINIC | Age: 60
End: 2020-12-08

## 2020-12-08 ENCOUNTER — VIRTUAL VISIT (OUTPATIENT)
Dept: FAMILY MEDICINE CLINIC | Age: 60
End: 2020-12-08
Payer: MEDICARE

## 2020-12-08 PROCEDURE — 99442 PR PHYS/QHP TELEPHONE EVALUATION 11-20 MIN: CPT | Performed by: FAMILY MEDICINE

## 2020-12-08 RX ORDER — PRAVASTATIN SODIUM 40 MG
TABLET ORAL
Qty: 90 TABLET | Refills: 3 | Status: SHIPPED | OUTPATIENT
Start: 2020-12-08 | End: 2021-12-15 | Stop reason: SDUPTHER

## 2020-12-08 RX ORDER — TRAZODONE HYDROCHLORIDE 150 MG/1
TABLET ORAL
Qty: 90 TABLET | Refills: 3 | Status: SHIPPED | OUTPATIENT
Start: 2020-12-08 | End: 2021-12-15 | Stop reason: SDUPTHER

## 2020-12-08 RX ORDER — OXYCODONE AND ACETAMINOPHEN 10; 325 MG/1; MG/1
1 TABLET ORAL EVERY 6 HOURS PRN
Qty: 90 TABLET | Refills: 0 | Status: SHIPPED | OUTPATIENT
Start: 2020-12-08 | End: 2021-03-09 | Stop reason: SDUPTHER

## 2020-12-08 RX ORDER — ALBUTEROL SULFATE 90 UG/1
200 POWDER, METERED RESPIRATORY (INHALATION) 2 TIMES DAILY
Qty: 1 INHALER | Refills: 5 | Status: SHIPPED | OUTPATIENT
Start: 2020-12-08 | End: 2022-07-12 | Stop reason: SDUPTHER

## 2020-12-08 RX ORDER — TACROLIMUS 1 MG/1
CAPSULE ORAL
Qty: 180 CAPSULE | Refills: 3 | Status: SHIPPED | OUTPATIENT
Start: 2020-12-08 | End: 2021-12-15 | Stop reason: SDUPTHER

## 2020-12-08 RX ORDER — OXYCODONE AND ACETAMINOPHEN 10; 325 MG/1; MG/1
1 TABLET ORAL EVERY 6 HOURS PRN
Qty: 90 TABLET | Refills: 0 | Status: SHIPPED | OUTPATIENT
Start: 2020-12-08 | End: 2020-12-08 | Stop reason: SDUPTHER

## 2020-12-08 ASSESSMENT — PATIENT HEALTH QUESTIONNAIRE - PHQ9
SUM OF ALL RESPONSES TO PHQ QUESTIONS 1-9: 2
SUM OF ALL RESPONSES TO PHQ9 QUESTIONS 1 & 2: 2
2. FEELING DOWN, DEPRESSED OR HOPELESS: 1
1. LITTLE INTEREST OR PLEASURE IN DOING THINGS: 1

## 2020-12-08 ASSESSMENT — ENCOUNTER SYMPTOMS: BACK PAIN: 1

## 2020-12-08 NOTE — PROGRESS NOTES
Chief Complaint   Patient presents with    Diabetes       Have you seen any other physician or provider since your last visit yes - Aissatou Ra    Have you had any other diagnostic tests since your last visit? yes - urine    Have you changed or stopped any medications since your last visit? no     I have recommended that this patient have a  but he declines at this time. I have discussed the risks and benefits of this examination with him. The patient verbalizes understanding. Diabetic retinal exam completed this year?  No    Will make appointment soon

## 2020-12-08 NOTE — PROGRESS NOTES
SUBJECTIVE:    Patient ID: Xiomara Bran is a 62 y.o. male. Chief Complaint   Patient presents with    Diabetes    Depression       HPI: Xiomara Bran is a 62 y.o. male evaluated via telephone on 12/8/2020. Consent:  He and/or health care decision maker is aware that that he may receive a bill for this telephone service, depending on his insurance coverage, and has provided verbal consent to proceed: yes      Documentation:  I communicated with the patient and/or health care decision maker about his blood sugar,  His medication , his pain  Details of this discussion including any medical advice provided: yes      I affirm this is a Patient Initiated Episode with a Patient who has not had a related appointment within my department in the past 7 days or scheduled within the next 24 hours. Patient identification was verified at the start of the visit: yes    Total Time: 12 minutes    Note: not billable if this call serves to triage the patient into an appointment for the relevant concern      Jazmyne Hart   He is off the metformin. He says he was having some significant nausea. He says that he was not taking it with food. He has not been good about taking his insulin all the time. He has not been checking his sugars like he should. He says he is been eating things probably that he should not as well. He says he has not done well at all in the last 3 months. He is not having any significant other medication problems. He has not had any chest pain. He denies any shortness of breath. He says he feels more tired than he probably should. He says he has been trying to be active as much as he can with coronavirus restrictions. Review of Systems   Musculoskeletal: Positive for back pain and gait problem. All other systems reviewed and are negative. OBJECTIVE:  There were no vitals taken for this visit.    Wt Readings from Last 3 Encounters:   09/30/20 225 lb 14.4 oz (102.5 kg) Glucose 245 (H) (12/10/2020) 74 - 106 mg/dL Not in Time Range    Potassium 5.5 (H) (12/10/2020) 3.4 - 5.1 mmol/L Not in Time Range    Sodium 135 (L) (12/10/2020) 136 - 145 mmol/L Not in Time Range    Total Bilirubin 0.5 (12/10/2020) 0.3 - 1.2 mg/dL Not in Time Range    Total Protein 6.7 (12/10/2020) 6.4 - 8.3 g/dL Not in Time Range      Hemoglobin A1C (%)   Date Value   12/10/2020 9.5 (H)     Microalbumin, Random Urine (mg/dL)   Date Value   05/13/2020 305.10 (H)     LDL Calculated (mg/dL)   Date Value   08/14/2019 55       Lab Results   Component Value Date    WBC 10.0 12/10/2020    NEUTROABS 7.0 12/10/2020    HGB 14.8 12/10/2020    HCT 44.7 12/10/2020    MCV 88.0 12/10/2020     12/10/2020     Lab Results   Component Value Date    TSH 3.47 05/13/2020       ASSESSMENT:    Diagnosis Orders   1. Type 2 diabetes mellitus with complication, with long-term current use of insulin (HCC)  Hemoglobin A1C    COMPREHENSIVE METABOLIC PANEL   2. Arthritis  oxyCODONE-acetaminophen (PERCOCET)  MG per tablet    DISCONTINUED: oxyCODONE-acetaminophen (PERCOCET)  MG per tablet   3. History of liver transplant (HCC)  tacrolimus (PROGRAF) 1 MG capsule   4. Essential hypertension  metoprolol tartrate (LOPRESSOR) 25 MG tablet   5. Anxiety     6. Diabetic polyneuropathy associated with type 2 diabetes mellitus ([facility] [facility]          PLAN:  Orders Placed This Encounter   Medications    DISCONTD: oxyCODONE-acetaminophen (PERCOCET)  MG per tablet     Sig: Take 1 tablet by mouth every 6 hours as needed for Pain for up to 30 days.      Dispense:  90 tablet     Refill:  0     Reduce doses taken as pain becomes manageable    tacrolimus (PROGRAF) 1 MG capsule     Sig: TAKE ONE CAPSULE BY MOUTH TWICE A DAY     Dispense:  180 capsule     Refill:  3    Cetirizine HCl 10 MG TBDP     Sig: Take 1 tablet by mouth daily     Dispense:  90 tablet     Refill:  3    metoprolol tartrate (LOPRESSOR) 25 MG tablet Sig: TAKE ONE TABLET BY MOUTH DAILY     Dispense:  90 tablet     Refill:  3    traZODone (DESYREL) 150 MG tablet     Sig: TAKE ONE TABLET BY MOUTH ONCE NIGHTLY     Dispense:  90 tablet     Refill:  3    pravastatin (PRAVACHOL) 40 MG tablet     Sig: TAKE ONE TABLET BY MOUTH DAILY     Dispense:  90 tablet     Refill:  3    SITagliptin (JANUVIA) 100 MG tablet     Sig: Take 1 tablet by mouth every morning (before breakfast)     Dispense:  90 tablet     Refill:  3    Insulin Glargine, 2 Unit Dial, 300 UNIT/ML SOPN     Sig: Inject 50 Units into the skin daily     Dispense:  3 mL     Refill:  5    PROAIR RESPICLICK 285 (90 Base) MCG/ACT aerosol powder inhalation     Sig: Inhale 200 puffs into the lungs 2 times daily     Dispense:  1 Inhaler     Refill:  5    oxyCODONE-acetaminophen (PERCOCET)  MG per tablet     Sig: Take 1 tablet by mouth every 6 hours as needed for Pain for up to 30 days. Dispense:  90 tablet     Refill:  0     Reduce doses taken as pain becomes manageable        Medications Discontinued During This Encounter   Medication Reason    metFORMIN (GLUCOPHAGE) 1000 MG tablet LIST CLEANUP    oxyCODONE-acetaminophen (PERCOCET)  MG per tablet REORDER    tacrolimus (PROGRAF) 1 MG capsule REORDER    Cetirizine HCl 10 MG TBDP REORDER    metoprolol tartrate (LOPRESSOR) 25 MG tablet REORDER    traZODone (DESYREL) 150 MG tablet REORDER    pravastatin (PRAVACHOL) 40 MG tablet REORDER    SITagliptin (JANUVIA) 100 MG tablet REORDER    Insulin Glargine, 2 Unit Dial, 300 UNIT/ML SOPN REORDER    PROAIR RESPICLICK 877 (90 Base) MCG/ACT aerosol powder inhalation REORDER    oxyCODONE-acetaminophen (PERCOCET)  MG per tablet REORDER       Controlled Substances Monitoring:      Please note: This chart was generated using Dragon dictation software. Although every effort was made to ensure the accuracy of this automated transcription, some errors in transcription may have occurred.

## 2020-12-10 ENCOUNTER — HOSPITAL ENCOUNTER (OUTPATIENT)
Facility: HOSPITAL | Age: 60
Discharge: HOME | End: 2020-12-10
Payer: MEDICARE

## 2020-12-10 LAB
A/G RATIO: 1.4 (ref 0.8–2)
ALBUMIN SERPL-MCNC: 3.9 G/DL (ref 3.4–4.8)
ALP BLD-CCNC: 219 U/L (ref 25–100)
ALT SERPL-CCNC: 27 U/L (ref 4–36)
ANION GAP SERPL CALCULATED.3IONS-SCNC: 9 MMOL/L (ref 3–16)
AST SERPL-CCNC: 27 U/L (ref 8–33)
BASOPHILS ABSOLUTE: 0.1 K/UL (ref 0–0.1)
BASOPHILS RELATIVE PERCENT: 0.8 %
BILIRUB SERPL-MCNC: 0.5 MG/DL (ref 0.3–1.2)
BUN BLDV-MCNC: 20 MG/DL (ref 6–20)
CALCIUM SERPL-MCNC: 9.2 MG/DL (ref 8.5–10.5)
CHLORIDE BLD-SCNC: 98 MMOL/L (ref 98–107)
CO2: 28 MMOL/L (ref 20–30)
CREAT SERPL-MCNC: 1.8 MG/DL (ref 0.4–1.2)
EOSINOPHILS ABSOLUTE: 0.2 K/UL (ref 0–0.4)
EOSINOPHILS RELATIVE PERCENT: 1.7 %
GAMMA GLUTAMYL TRANSFERASE: 249 U/L (ref 8–61)
GFR AFRICAN AMERICAN: 47
GFR NON-AFRICAN AMERICAN: 39
GLOBULIN: 2.8 G/DL
GLUCOSE BLD-MCNC: 245 MG/DL (ref 74–106)
HBA1C MFR BLD: 9.5 %
HCT VFR BLD CALC: 44.7 % (ref 40–54)
HEMOGLOBIN: 14.8 G/DL (ref 13–18)
IMMATURE GRANULOCYTES #: 0.1 K/UL
IMMATURE GRANULOCYTES %: 0.9 % (ref 0–5)
LYMPHOCYTES ABSOLUTE: 2.1 K/UL (ref 1.5–4)
LYMPHOCYTES RELATIVE PERCENT: 20.6 %
MAGNESIUM: 1.9 MG/DL (ref 1.7–2.4)
MCH RBC QN AUTO: 29.1 PG (ref 27–32)
MCHC RBC AUTO-ENTMCNC: 33.1 G/DL (ref 31–35)
MCV RBC AUTO: 88 FL (ref 80–100)
MONOCYTES ABSOLUTE: 0.6 K/UL (ref 0.2–0.8)
MONOCYTES RELATIVE PERCENT: 6.4 %
NEUTROPHILS ABSOLUTE: 7 K/UL (ref 2–7.5)
NEUTROPHILS RELATIVE PERCENT: 69.6 %
PDW BLD-RTO: 14.3 % (ref 11–16)
PLATELET # BLD: 152 K/UL (ref 150–400)
PMV BLD AUTO: 10.4 FL (ref 6–10)
POTASSIUM SERPL-SCNC: 5.5 MMOL/L (ref 3.4–5.1)
RBC # BLD: 5.08 M/UL (ref 4.5–6)
SODIUM BLD-SCNC: 135 MMOL/L (ref 136–145)
TOTAL PROTEIN: 6.7 G/DL (ref 6.4–8.3)
WBC # BLD: 10 K/UL (ref 4–11)

## 2020-12-10 PROCEDURE — 83735 ASSAY OF MAGNESIUM: CPT

## 2020-12-10 PROCEDURE — 82977 ASSAY OF GGT: CPT

## 2020-12-10 PROCEDURE — 36415 COLL VENOUS BLD VENIPUNCTURE: CPT

## 2020-12-10 PROCEDURE — 85025 COMPLETE CBC W/AUTO DIFF WBC: CPT

## 2020-12-10 PROCEDURE — 83036 HEMOGLOBIN GLYCOSYLATED A1C: CPT

## 2020-12-10 PROCEDURE — 80053 COMPREHEN METABOLIC PANEL: CPT

## 2021-01-12 ENCOUNTER — CARE COORDINATION (OUTPATIENT)
Dept: CARE COORDINATION | Age: 61
End: 2021-01-12

## 2021-01-12 NOTE — CARE COORDINATION
I placed a call to [facility] per PCP request for diabetes education. Message left with contact information.

## 2021-03-09 ENCOUNTER — OFFICE VISIT (OUTPATIENT)
Dept: FAMILY MEDICINE CLINIC | Age: 61
End: 2021-03-09
Payer: MEDICARE

## 2021-03-09 ENCOUNTER — HOSPITAL ENCOUNTER (OUTPATIENT)
Facility: HOSPITAL | Age: 61
Discharge: HOME | End: 2021-03-09
Payer: MEDICARE

## 2021-03-09 VITALS
WEIGHT: 220.5 LBS | BODY MASS INDEX: 30.87 KG/M2 | HEART RATE: 79 BPM | SYSTOLIC BLOOD PRESSURE: 138 MMHG | OXYGEN SATURATION: 99 % | HEIGHT: 71 IN | DIASTOLIC BLOOD PRESSURE: 76 MMHG | RESPIRATION RATE: 18 BRPM | TEMPERATURE: 97.2 F

## 2021-03-09 DIAGNOSIS — Z79.4 TYPE 2 DIABETES MELLITUS WITH COMPLICATION, WITH LONG-TERM CURRENT USE OF INSULIN (HCC): ICD-10-CM

## 2021-03-09 DIAGNOSIS — F41.9 ANXIETY: ICD-10-CM

## 2021-03-09 DIAGNOSIS — R35.0 URINE FREQUENCY: Primary | ICD-10-CM

## 2021-03-09 DIAGNOSIS — R53.83 FATIGUE, UNSPECIFIED TYPE: ICD-10-CM

## 2021-03-09 DIAGNOSIS — Z94.4 HISTORY OF LIVER TRANSPLANT (HCC): ICD-10-CM

## 2021-03-09 DIAGNOSIS — E11.8 TYPE 2 DIABETES MELLITUS WITH COMPLICATION, WITH LONG-TERM CURRENT USE OF INSULIN (HCC): ICD-10-CM

## 2021-03-09 DIAGNOSIS — I10 ESSENTIAL HYPERTENSION: ICD-10-CM

## 2021-03-09 DIAGNOSIS — M19.90 ARTHRITIS: ICD-10-CM

## 2021-03-09 DIAGNOSIS — E11.42 DIABETIC POLYNEUROPATHY ASSOCIATED WITH TYPE 2 DIABETES MELLITUS (HCC): ICD-10-CM

## 2021-03-09 LAB
A/G RATIO: 1 (ref 0.8–2)
ALBUMIN SERPL-MCNC: 3.6 G/DL (ref 3.4–4.8)
ALP BLD-CCNC: 238 U/L (ref 25–100)
ALT SERPL-CCNC: 27 U/L (ref 4–36)
ANION GAP SERPL CALCULATED.3IONS-SCNC: 8 MMOL/L (ref 3–16)
AST SERPL-CCNC: 22 U/L (ref 8–33)
BILIRUB SERPL-MCNC: 0.4 MG/DL (ref 0.3–1.2)
BILIRUBIN, POC: NORMAL
BLOOD URINE, POC: NORMAL
BUN BLDV-MCNC: 22 MG/DL (ref 6–20)
CALCIUM SERPL-MCNC: 9.3 MG/DL (ref 8.5–10.5)
CHLORIDE BLD-SCNC: 97 MMOL/L (ref 98–107)
CLARITY, POC: NORMAL
CO2: 29 MMOL/L (ref 20–30)
COLOR, POC: YELLOW
CREAT SERPL-MCNC: 1.8 MG/DL (ref 0.4–1.2)
GFR AFRICAN AMERICAN: 47
GFR NON-AFRICAN AMERICAN: 39
GLOBULIN: 3.6 G/DL
GLUCOSE BLD-MCNC: 384 MG/DL (ref 74–106)
GLUCOSE URINE, POC: 2
HBA1C MFR BLD: 9.6 %
KETONES, POC: NORMAL
LEUKOCYTE EST, POC: NORMAL
NITRITE, POC: NORMAL
PH, POC: 5
POTASSIUM SERPL-SCNC: 5.1 MMOL/L (ref 3.4–5.1)
PROTEIN, POC: 2000
SODIUM BLD-SCNC: 134 MMOL/L (ref 136–145)
SPECIFIC GRAVITY, POC: 1.03
TOTAL PROTEIN: 7.2 G/DL (ref 6.4–8.3)
TSH SERPL DL<=0.05 MIU/L-ACNC: 2.33 UIU/ML (ref 0.27–4.2)
UROBILINOGEN, POC: 0.2

## 2021-03-09 PROCEDURE — 99214 OFFICE O/P EST MOD 30 MIN: CPT | Performed by: FAMILY MEDICINE

## 2021-03-09 PROCEDURE — 87186 SC STD MICRODIL/AGAR DIL: CPT

## 2021-03-09 PROCEDURE — 81002 URINALYSIS NONAUTO W/O SCOPE: CPT | Performed by: FAMILY MEDICINE

## 2021-03-09 PROCEDURE — 80053 COMPREHEN METABOLIC PANEL: CPT

## 2021-03-09 PROCEDURE — 87077 CULTURE AEROBIC IDENTIFY: CPT

## 2021-03-09 PROCEDURE — 84443 ASSAY THYROID STIM HORMONE: CPT

## 2021-03-09 PROCEDURE — 87086 URINE CULTURE/COLONY COUNT: CPT

## 2021-03-09 PROCEDURE — 83036 HEMOGLOBIN GLYCOSYLATED A1C: CPT

## 2021-03-09 RX ORDER — GABAPENTIN 300 MG/1
CAPSULE ORAL
Qty: 270 CAPSULE | Refills: 1 | Status: SHIPPED | OUTPATIENT
Start: 2021-03-09 | End: 2021-04-29

## 2021-03-09 RX ORDER — OXYCODONE AND ACETAMINOPHEN 10; 325 MG/1; MG/1
1 TABLET ORAL EVERY 6 HOURS PRN
Qty: 90 TABLET | Refills: 0 | Status: SHIPPED | OUTPATIENT
Start: 2021-03-09 | End: 2021-06-27 | Stop reason: SDUPTHER

## 2021-03-09 RX ORDER — FLUTICASONE PROPIONATE 50 MCG
2 SPRAY, SUSPENSION (ML) NASAL DAILY
Qty: 3 BOTTLE | Refills: 3 | Status: SHIPPED | OUTPATIENT
Start: 2021-03-09 | End: 2021-04-29

## 2021-03-09 RX ORDER — CLONAZEPAM 1 MG/1
1 TABLET ORAL 2 TIMES DAILY PRN
Qty: 60 TABLET | Refills: 2 | Status: SHIPPED | OUTPATIENT
Start: 2021-03-09 | End: 2021-06-27 | Stop reason: SDUPTHER

## 2021-03-09 RX ORDER — LEVOTHYROXINE SODIUM 88 UG/1
TABLET ORAL
Qty: 90 TABLET | Refills: 3 | Status: SHIPPED | OUTPATIENT
Start: 2021-03-09 | End: 2021-12-15 | Stop reason: SDUPTHER

## 2021-03-09 ASSESSMENT — PATIENT HEALTH QUESTIONNAIRE - PHQ9
2. FEELING DOWN, DEPRESSED OR HOPELESS: 0
SUM OF ALL RESPONSES TO PHQ QUESTIONS 1-9: 0

## 2021-03-09 ASSESSMENT — ENCOUNTER SYMPTOMS: BACK PAIN: 1

## 2021-03-09 NOTE — PROGRESS NOTES
Chief Complaint   Patient presents with    Diabetes       Have you seen any other physician or provider since your last visit yes - noe    Have you had any other diagnostic tests since your last visit? yes - u/s    Have you changed or stopped any medications since your last visit? no     I have recommended that this patient have a covid but he declines at this time. I have discussed the risks and benefits of this examination with him. The patient verbalizes understanding.

## 2021-03-09 NOTE — PROGRESS NOTES
motion and neck supple. Thyroid: No thyroid mass or thyromegaly. Vascular: No carotid bruit. Trachea: Trachea and phonation normal.   Cardiovascular:      Rate and Rhythm: Normal rate and regular rhythm. Pulses: Normal pulses. Heart sounds: Normal heart sounds, S1 normal and S2 normal. No murmur. No friction rub. No gallop. Pulmonary:      Effort: Pulmonary effort is normal.      Breath sounds: Normal breath sounds. Musculoskeletal:      Cervical back: He exhibits decreased range of motion, tenderness, pain and spasm. Lumbar back: He exhibits decreased range of motion, tenderness, pain and spasm. Skin:     General: Skin is warm and dry. Neurological:      Mental Status: He is alert and oriented to person, place, and time. Mental status is at baseline. Psychiatric:         Attention and Perception: Attention and perception normal.         Mood and Affect: Mood and affect normal.         Speech: Speech normal.         Behavior: Behavior is cooperative. Thought Content:  Thought content normal.         Cognition and Memory: Cognition and memory normal.         Judgment: Judgment normal.          Results in Past 30 Days  Result Component Current Result Ref Range Previous Result Ref Range   Albumin/Globulin Ratio 1.0 (3/9/2021) 0.8 - 2.0 Not in Time Range    Albumin 3.6 (3/9/2021) 3.4 - 4.8 g/dL Not in Time Range    Alkaline Phosphatase 238 (H) (3/9/2021) 25 - 100 U/L Not in Time Range    ALT 27 (3/9/2021) 4 - 36 U/L Not in Time Range    AST 22 (3/9/2021) 8 - 33 U/L Not in Time Range    BUN 22 (H) (3/9/2021) 6 - 20 mg/dL Not in Time Range    Calcium 9.3 (3/9/2021) 8.5 - 10.5 mg/dL Not in Time Range    Chloride 97 (L) (3/9/2021) 98 - 107 mmol/L Not in Time Range    CO2 29 (3/9/2021) 20 - 30 mmol/L Not in Time Range    CREATININE 1.8 (H) (3/9/2021) 0.4 - 1.2 mg/dL Not in Time Range    GFR  47 (L) (3/9/2021) >59 Not in Time Range    GFR Non- 39 (L) (3/9/2021) >59 Not in Time Range    Globulin 3.6 (3/9/2021) g/dL Not in Time Range    Glucose 384 (H) (3/9/2021) 74 - 106 mg/dL Not in Time Range    Potassium 5.1 (3/9/2021) 3.4 - 5.1 mmol/L Not in Time Range    Sodium 134 (L) (3/9/2021) 136 - 145 mmol/L Not in Time Range    Total Bilirubin 0.4 (3/9/2021) 0.3 - 1.2 mg/dL Not in Time Range    Total Protein 7.2 (3/9/2021) 6.4 - 8.3 g/dL Not in Time Range      Hemoglobin A1C (%)   Date Value   03/09/2021 9.6 (H)     Microalbumin, Random Urine (mg/dL)   Date Value   05/13/2020 305.10 (H)     LDL Calculated (mg/dL)   Date Value   08/14/2019 55       Lab Results   Component Value Date    WBC 10.0 12/10/2020    NEUTROABS 7.0 12/10/2020    HGB 14.8 12/10/2020    HCT 44.7 12/10/2020    MCV 88.0 12/10/2020     12/10/2020     Lab Results   Component Value Date    TSH 2.33 03/09/2021       ASSESSMENT:    Diagnosis Orders   1. Urine frequency  POCT Urinalysis no Micro    Culture, Urine   2. Type 2 diabetes mellitus with complication, with long-term current use of insulin (HCC)  gabapentin (NEURONTIN) 300 MG capsule    COMPREHENSIVE METABOLIC PANEL    HEMOGLOBIN A1C   3. Anxiety  clonazePAM (KLONOPIN) 1 MG tablet   4. Arthritis  oxyCODONE-acetaminophen (PERCOCET)  MG per tablet   5. History of liver transplant ([facility]     6. Essential hypertension     7. Diabetic polyneuropathy associated with type 2 diabetes mellitus ([facility]     8.  Fatigue, unspecified type  TSH without Reflex        PLAN:  Orders Placed This Encounter   Medications    levothyroxine (SYNTHROID) 88 MCG tablet     Sig: TAKE ONE TABLET BY MOUTH DAILY WITH WATER ON AN EMPTY STOMACH, WAIT 30 MINUTES BEFORE EATING OR TAKING OTHER MEDS     Dispense:  90 tablet     Refill:  3    gabapentin (NEURONTIN) 300 MG capsule     Sig: TAKE ONE CAPSULE BY MOUTH THREE TIMES A DAY FOR LEG AND FOOT PAIN     Dispense:  270 capsule     Refill:  1    clonazePAM (KLONOPIN) 1 MG tablet     Sig: Take 1 tablet by mouth 2 times daily as needed for Anxiety for up to 30 days. Dispense:  60 tablet     Refill:  2    fluticasone (FLONASE) 50 MCG/ACT nasal spray     Si sprays by Each Nostril route daily     Dispense:  3 Bottle     Refill:  3    oxyCODONE-acetaminophen (PERCOCET)  MG per tablet     Sig: Take 1 tablet by mouth every 6 hours as needed for Pain for up to 30 days. Dispense:  90 tablet     Refill:  0     Reduce doses taken as pain becomes manageable        Medications Discontinued During This Encounter   Medication Reason    fluticasone (FLONASE) 50 MCG/ACT nasal spray REORDER    levothyroxine (SYNTHROID) 88 MCG tablet REORDER    gabapentin (NEURONTIN) 300 MG capsule REORDER    clonazePAM (KLONOPIN) 1 MG tablet REORDER    oxyCODONE-acetaminophen (PERCOCET)  MG per tablet REORDER       Controlled Substances Monitoring: Attestation: The Prescription Monitoring Report for this patient was reviewed today. Emerita Landers MD)  Periodic Controlled Substance Monitoring: Possible medication side effects, risk of tolerance/dependence & alternative treatments discussed., No signs of potential drug abuse or diversion identified. , Assessed functional status., Obtaining appropriate analgesic effect of treatment. Emerita Landers MD)    Please note: This chart was generated using Dragon dictation software. Although every effort was made to ensure the accuracy of this automated transcription, some errors in transcription may have occurred.

## 2021-03-09 NOTE — PATIENT INSTRUCTIONS
they seem to have the same symptoms. What may be good for you may be harmful to others. · If you are no longer taking a prescribed medication and you have pills left please take your pills out of their original containers. Mix crushed pills with an undesirable substance, such as cat litter or used coffee grounds. Put the mixture into a disposable container with a lid, such as an empty margarine tub, or into a sealable bag. Cover up or remove any of your personal information on the empty containers by covering it with black permanent marker or duct tape. Place the sealed container with the mixture, and the empty drug containers, in the trash. · If you use a medication that is in the form of a patch, dispose of used patches by folding them in half so that the sticky sides meet, and then flushing them down a toilet. They should not be placed in the household trash where children or pets can find them. · If you have any questions, ask your provider or pharmacist for more information. · Be sure to keep all appointments for provider visits or tests.     Ready to quit: No  Counseling given: Yes  Comment: pt states has not smoked in 2 weeks

## 2021-03-12 LAB
ORGANISM: ABNORMAL
URINE CULTURE, ROUTINE: ABNORMAL

## 2021-03-22 RX ORDER — NITROFURANTOIN 25; 75 MG/1; MG/1
100 CAPSULE ORAL 2 TIMES DAILY
Qty: 14 CAPSULE | Refills: 0 | Status: SHIPPED | OUTPATIENT
Start: 2021-03-22 | End: 2021-03-29

## 2021-03-30 NOTE — PROGRESS NOTES
Chief Complaint   Patient presents with    Diabetes       Have you seen any other physician or provider since your last visit no    Have you had any other diagnostic tests since your last visit? no    Have you changed or stopped any medications since your last visit? no           2Health Maintenance Due This Visit   Colonoscopy Yes   Mammogram No   Annual Wellness Visit No   Microalbumin No   HgbA1C No   Diabetic Eye Exam Yes    House Bill One Due This Visit   MELLO No   UDS No   Contract No

## 2021-03-31 ENCOUNTER — OFFICE VISIT (OUTPATIENT)
Dept: FAMILY MEDICINE CLINIC | Age: 61
End: 2021-03-31
Payer: MEDICARE

## 2021-03-31 VITALS
RESPIRATION RATE: 18 BRPM | HEART RATE: 76 BPM | SYSTOLIC BLOOD PRESSURE: 128 MMHG | DIASTOLIC BLOOD PRESSURE: 72 MMHG | WEIGHT: 222 LBS | HEIGHT: 71 IN | BODY MASS INDEX: 31.08 KG/M2 | OXYGEN SATURATION: 97 % | TEMPERATURE: 96.6 F

## 2021-03-31 DIAGNOSIS — I10 ESSENTIAL HYPERTENSION: ICD-10-CM

## 2021-03-31 DIAGNOSIS — E11.8 TYPE 2 DIABETES MELLITUS WITH COMPLICATION, WITH LONG-TERM CURRENT USE OF INSULIN (HCC): Primary | ICD-10-CM

## 2021-03-31 DIAGNOSIS — Z79.4 TYPE 2 DIABETES MELLITUS WITH COMPLICATION, WITH LONG-TERM CURRENT USE OF INSULIN (HCC): Primary | ICD-10-CM

## 2021-03-31 LAB
CHP ED QC CHECK: NORMAL
GLUCOSE BLD-MCNC: 280 MG/DL

## 2021-03-31 PROCEDURE — 82962 GLUCOSE BLOOD TEST: CPT | Performed by: FAMILY MEDICINE

## 2021-03-31 PROCEDURE — 99213 OFFICE O/P EST LOW 20 MIN: CPT | Performed by: FAMILY MEDICINE

## 2021-03-31 ASSESSMENT — ENCOUNTER SYMPTOMS: BACK PAIN: 1

## 2021-03-31 NOTE — PROGRESS NOTES
SUBJECTIVE:    Patient ID: Darion Alexis is a 62 y.o. male. Chief Complaint   Patient presents with    Diabetes       HPI: office visit  His blood sugars are doing better. He says been trying much better more to control his diet. He is actually been taking his medicines like he supposed to. He says he is realizing that he is definitely got it better than he has been. He has not had any significant chest pain or shortness of breath. He is having relatively good blood pressure readings. He has not had any significant medication problems that he can tell. Review of Systems   Musculoskeletal: Positive for back pain and gait problem. All other systems reviewed and are negative. OBJECTIVE:  /72   Pulse 76   Temp 96.6 °F (35.9 °C) (Infrared)   Resp 18   Ht 5' 11\" (1.803 m)   Wt 222 lb (100.7 kg)   SpO2 97%   BMI 30.96 kg/m²    Wt Readings from Last 3 Encounters:   03/31/21 222 lb (100.7 kg)   03/09/21 220 lb 8 oz (100 kg)   09/30/20 225 lb 14.4 oz (102.5 kg)     BP Readings from Last 3 Encounters:   03/31/21 128/72   03/09/21 138/76   09/30/20 114/72      Pulse Readings from Last 3 Encounters:   03/31/21 76   03/09/21 79   09/30/20 75     Body mass index is 30.96 kg/m². Resp Readings from Last 3 Encounters:   03/31/21 18   03/09/21 18   09/30/20 18     Past medical, surgical, family and social history were reviewed and updated with the patient. Physical Exam  Vitals signs and nursing note reviewed. Constitutional:       Appearance: He is well-developed. HENT:      Head: Normocephalic and atraumatic. Right Ear: Hearing and external ear normal.      Left Ear: Hearing and external ear normal.      Nose: Nose normal.      Mouth/Throat:      Pharynx: Uvula midline. Eyes:      General: Lids are normal.      Conjunctiva/sclera: Conjunctivae normal.      Pupils: Pupils are equal, round, and reactive to light. Neck:      Musculoskeletal: Normal range of motion and neck supple. continue to take his medication daily. I also encouraged him to try his best to stay on his diet. There are no discontinued medications. Controlled Substances Monitoring:      Please note: This chart was generated using Dragon dictation software. Although every effort was made to ensure the accuracy of this automated transcription, some errors in transcription may have occurred.

## 2021-03-31 NOTE — PATIENT INSTRUCTIONS
· Keep a list of your medicines with you. List all of the prescription medicines, nonprescription medicines, supplements, natural remedies, and vitamins that you take. Tell your healthcare providers who treat you about all of the products you are taking. Your provider can provide you with a form to keep track of them. Just ask. · Follow the directions that come with your medicine, including information about food or alcohol. Make sure you know how and when to take your medicine. Do not take more or less than you are supposed to take. · Keep all medicines out of the reach of children. · Store medicines according to the directions on the label. · Monitor yourself. Learn to know how your body reacts to your new medicine and keep track of how it makes you feel before attempting (If your provider has allowed you to do so) to drive or go to work. · Seek emergency medical attention if you think you have used too much of this medicine. An overdose of any prescription medicine can be fatal. Overdose symptoms may include extreme drowsiness, muscle weakness, confusion, cold and clammy skin, pinpoint pupils, shallow breathing, slow heart rate, fainting, or coma. · Don't share prescription medicines with others, even when they seem to have the same symptoms. What may be good for you may be harmful to others. · If you are no longer taking a prescribed medication and you have pills left please take your pills out of their original containers. Mix crushed pills with an undesirable substance, such as cat litter or used coffee grounds. Put the mixture into a disposable container with a lid, such as an empty margarine tub, or into a sealable bag. Cover up or remove any of your personal information on the empty containers by covering it with black permanent marker or duct tape. Place the sealed container with the mixture, and the empty drug containers, in the trash.    · If you use a medication that is in the form of a patch, dispose of used patches by folding them in half so that the sticky sides meet, and then flushing them down a toilet. They should not be placed in the household trash where children or pets can find them. · If you have any questions, ask your provider or pharmacist for more information. · Be sure to keep all appointments for provider visits or tests. We are committed to providing you with the best care possible. In order to help us achieve these goals please remember to bring all medications, herbal products, and over the counter supplements with you to each visit. If your provider has ordered testing for you, please be sure to follow up with our office if you have not received results within 7 days after the testing took place. *If you receive a survey after visiting one of our offices, please take time to share your experience concerning your physician office visit. These surveys are confidential and no health information about you is shared. We are eager to improve for you and we are counting on your feedback to help make that happen. ips to Help You Stop Smoking       Cigarette smoking is a preventable cause of death in the [place]. If you have thought about quitting but haven't been able to, here are some reasons why you should and some ways to do it. Here's Why   Quitting smoking now can decrease your risk of getting smoking-related illnesses like:   Heart disease   Stroke   Several types of cancer, including:   Lung   Mouth   Esophagus   Larynx   Bladder   Pancreas   Kidney   Chronic lung diseases:   Bronchitis   Emphysema   Asthma   Cataracts   Macular degeneration   Thyroid conditions   Hearing loss   Erectile dysfunction   Dementia   Osteoporosis   Here's How   Once you've decided to quit smoking, set your target quit date a few weeks away.  In the time leading up to your quit day, try some of these ideas offered by the [address] [place] [address] [place] to help you successfully quit smoking. For the best results, work with your doctor. Together, you can test your lung function and compare the results to those of a nonsmoking person. The results can be given to you as your lung age. Finding out your lung age right after having the test done may help you to stop smoking. Your doctor can also discuss with you all of your options and refer you to smoking-cessation support groups. You may wish to use nicotine replacement (gum, patches, inhaler) or one of the prescription medications that have been shown to increase quit rates and prolong abstinence from smoking. But whatever you and your doctor decide on these matters, it will still be you who decides when an how to quit. Here are some techniques:   Switch Brands   Switch to a brand you find distasteful. Change to a brand that is low in tar and nicotine a couple of weeks before your target quit date. This will help change your smoking behavior. However, do not smoke more cigarettes, inhale them more often or more deeply, or place your fingertips over the holes in the filters. All of these actions will increase your nicotine intake, and the idea is to get your body used to functioning without nicotine. Cut Down the Number of Cigarettes You Smoke   Smoke only half of each cigarette. Each day, postpone the lighting of your first cigarette by one hour. Decide you'll only smoke during odd or even hours of the day. Decide beforehand how many cigarettes you'll smoke during the day. For each additional cigarette, give a dollar to your favorite cecil. Change your eating habits to help you cut down. For example, drink milk, which many people consider incompatible with smoking. End meals or snacks with something that won't lead to a cigarette. Reach for a glass of juice instead of a cigarette for a \"pick-me-up. \"   Remember: Cutting down can help you quit, but it's not a substitute for quitting.  If you're down to about seven cigarettes a day, it's time to set your target quit date, and get ready to stick to it. Don't Smoke \"Automatically\"   Smoke only those cigarettes you really want. Catch yourself before you light up a cigarette out of pure habit. Don't empty your ashtrays. This will remind you of how many cigarettes you've smoked each day, and the sight and the smell of stale cigarettes butts will be very unpleasant. Make yourself aware of each cigarette by using the opposite hand or putting cigarettes in an unfamiliar location or a different pocket to break the automatic reach. If you light up many times during the day without even thinking about it, try to look in a mirror each time you put a match to your cigarette. You may decide you don't need it. Make Smoking Inconvenient   Stop buying cigarettes by the carton. Wait until one pack is empty before you buy another. Stop carrying cigarettes with you at home or at work. Make them difficult to get to. Make Smoking Unpleasant   Smoke only under circumstances that aren't especially pleasurable for you. If you like to smoke with others, smoke alone. Turn your chair to an empty corner and focus only on the cigarette you are smoking and all its many negative effects. Collect all your cigarette butts in one large glass container as a visual reminder of the filth made by smoking. Just Before Quitting   Practice going without cigarettes. Don't think of never smoking again. Think of quitting in terms of one day at a time . Tell yourself you won't smoke today, and then don't. Clean your clothes to rid them of the cigarette smell, which can linger a long time. On the Day You Quit   Throw away all your cigarettes and matches. Hide your lighters and ashtrays. Visit the dentist and have your teeth cleaned to get rid of tobacco stains. Notice how nice they look and resolve to keep them that way.    Make a list of things you'd like to buy for yourself or someone else. Estimate the cost in terms of packs of cigarettes, and put the money aside to buy these presents. Keep very busy on the big day. Go to the movies, exercise, take long walks, or go bike riding. Remind your family and friends that this is your quit date, and ask them to help you over the rough spots of the first couple of days and weeks. Buy yourself a treat or do something special to celebrate. Telephone and Internet Support   Telephone, web-, and computer-based programs can offer you the support that you need to quit and to stay smoke-free. You can find many programs online, like the American [employer] Association's [place] from Smoking . Immediately After Quitting   Develop a clean, fresh, nonsmoking environment around yourselfat work and at home. Buy yourself flowersyou may be surprised how much you can enjoy their scent now. The first few days after you quit, spend as much free time as possible in places where smoking isn't allowed, such as [address] [place] [address], museums, theaters, department stores, and churches. Drink large quantities of water and fruit juice (but avoid sodas that contain caffeine). Try to avoid alcohol, coffee, and other beverages that you associate with cigarette smoking. Strike up conversation instead of a match for a cigarette. If you miss the sensation of having a cigarette in your hand, play with something elsea pencil, a paper clip, a marble. If you miss having something in your mouth, try toothpicks or a fake cigarette.

## 2021-04-15 ENCOUNTER — OFFICE VISIT (OUTPATIENT)
Dept: FAMILY MEDICINE CLINIC | Age: 61
End: 2021-04-15
Payer: MEDICARE

## 2021-04-15 VITALS — OXYGEN SATURATION: 97 % | RESPIRATION RATE: 20 BRPM | HEART RATE: 66 BPM | TEMPERATURE: 97.5 F

## 2021-04-15 DIAGNOSIS — I50.9 CONGESTIVE HEART FAILURE, UNSPECIFIED HF CHRONICITY, UNSPECIFIED HEART FAILURE TYPE (HCC): ICD-10-CM

## 2021-04-15 DIAGNOSIS — R06.02 SOB (SHORTNESS OF BREATH): Primary | ICD-10-CM

## 2021-04-15 PROCEDURE — 99212 OFFICE O/P EST SF 10 MIN: CPT | Performed by: NURSE PRACTITIONER

## 2021-04-15 RX ORDER — FLUTICASONE FUROATE AND VILANTEROL 200; 25 UG/1; UG/1
1 POWDER RESPIRATORY (INHALATION) DAILY
Qty: 1 EACH | Refills: 0 | Status: SHIPPED | OUTPATIENT
Start: 2021-04-15 | End: 2022-07-12 | Stop reason: SDUPTHER

## 2021-04-15 NOTE — PROGRESS NOTES
SUBJECTIVE:    Patient ID: Nicki Collins is a 62 y.o.male. Chief Complaint   Patient presents with    Shortness of Breath    Cough    Leg Pain     Lt    Otalgia     Lt         HPI:    Pt c/o BLE swelling and cough with SOB. Had J&J covid vaccine about 1 week ago and symptoms started about 4-5 days ago. Hx CHF, HTN, arthritis, liver transplant, DM. On lasix but feels like not helping very much. C/o worseing SOB and thinks pneumonia. Concerned for blood clot from vaccine. Requesting antibiotic. Denies chills, decreased urine, fevers, redness BLE, loss of sensation, injury, CP. Requesting breo inhaler which has helped him in the past.     Patient's medications, allergies, past medical, surgical, social and family histories were reviewed and updated as appropriate in electronic medical record. No outpatient medications have been marked as taking for the 4/15/21 encounter (Office Visit) with DELMY Thomas CNP. Review of Systems   Constitutional: Positive for fatigue. Negative for chills, diaphoresis and fever. HENT: Positive for congestion. Negative for sinus pain, sore throat and trouble swallowing. Eyes: Negative. Respiratory: Positive for cough and shortness of breath. Negative for wheezing and stridor. Cardiovascular: Negative. Gastrointestinal: Negative for diarrhea, nausea and vomiting. Genitourinary: Negative. Musculoskeletal: Positive for arthralgias and myalgias. Neurological: Negative.         Past Medical History:   Diagnosis Date    CHF (congestive heart failure) (HCC)     Crohn disease ([address]     Osteoarthritis     Vision abnormalities      Past Surgical History:   Procedure Laterality Date    CORONARY ANGIOPLASTY      LIVER TRANSPLANT       Family History   Problem Relation Age of Onset    Vision Loss Mother     Diabetes Father     Heart Disease Father     High Blood Pressure Father     High Cholesterol Father     Early Death Brother    [facility] Trace edema ankles   Lymphadenopathy:      Cervical: No cervical adenopathy. Skin:     General: Skin is warm. Capillary Refill: Capillary refill takes 2 to 3 seconds. Neurological:      General: No focal deficit present. Mental Status: He is alert and oriented to person, place, and time. Psychiatric:         Mood and Affect: Mood normal.         Thought Content: Thought content normal.         Lab Results   Component Value Date     03/09/2021    K 5.1 03/09/2021    CL 97 03/09/2021    CO2 29 03/09/2021    GLUCOSE 280 03/31/2021    BUN 22 03/09/2021    CREATININE 1.8 03/09/2021    CALCIUM 9.3 03/09/2021    PROT 7.2 03/09/2021    LABALBU 3.6 03/09/2021    BILITOT 0.4 03/09/2021    ALT 27 03/09/2021    AST 22 03/09/2021       Hemoglobin A1C (%)   Date Value   03/09/2021 9.6 (H)     Microalbumin, Random Urine (mg/dL)   Date Value   05/13/2020 305.10 (H)     LDL Calculated (mg/dL)   Date Value   08/14/2019 55         Lab Results   Component Value Date    WBC 10.0 12/10/2020    NEUTROABS 7.0 12/10/2020    HGB 14.8 12/10/2020    HCT 44.7 12/10/2020    MCV 88.0 12/10/2020     12/10/2020       Lab Results   Component Value Date    TSH 2.33 03/09/2021         ASSESSMENT/PLAN:     1. SOB (shortness of breath)  2. Congestive heart failure, unspecified HF chronicity, unspecified heart failure type (HCC)  NAD, oxygen sats >95%. Suspect CHF exacerbation and needs to go to ED. Antibiotics not needed at this time. Rx sent for breo inhaler. No concerns for DVT. Strongly advised ED but patient not wanting to go at this time. Education provided. RTC PRN or ED.     - Fluticasone furoate-vilanterol (BREO ELLIPTA) 200-25 MCG/INH AEPB inhaler; Inhale 1 puff into the lungs daily  Dispense: 1 each;  Refill: 0        Orders Placed This Encounter   Medications    Fluticasone furoate-vilanterol (BREO ELLIPTA) 200-25 MCG/INH AEPB inhaler     Sig: Inhale 1 puff into the lungs daily     Dispense:  1 each Refill:  0

## 2021-04-20 ENCOUNTER — TELEPHONE (OUTPATIENT)
Dept: FAMILY MEDICINE CLINIC | Age: 61
End: 2021-04-20

## 2021-04-20 NOTE — TELEPHONE ENCOUNTER
Patient left voicemail that his legs are still swollen and SOB whenever he lays down. I tried to call patient back. I left voicemail for him to call office.

## 2021-04-27 ENCOUNTER — CARE COORDINATION (OUTPATIENT)
Dept: CARE COORDINATION | Age: 61
End: 2021-04-27

## 2021-04-27 NOTE — CARE COORDINATION
[facility] Initial Follow Up Call    Call within 2 business days of discharge: Yes     Patient: Teo Zapata Patient : 1960 MRN: <[id number]>    Last Discharge [address]  22  Afib and pulmonary edema. RARS: No data recorded     Spoke with: Attempting HFU call, unsuccessful. Message left with contact information.      Discharge department/facility: [facility].     Non-face-to-face services provided:  Scheduled appointment with [facility]    Follow Up  Future Appointments   Date Time Provider Dasha Daley   2021  2:30 PM Demian Castro MD [address] [place], [place] [address]   2021  1:30 PM Demian Castro MD [address] [place] [address]   10/6/2021  1:15 PM Demian Castro MD [address] [place] [address]       Yulia Elizabeth RN

## 2021-04-28 NOTE — CARE COORDINATION
[facility] Initial Follow Up Call    Call within 2 business days of discharge: Yes     Patient: Renee Sandhu Patient : 1960 MRN: <[id number]>    Last Discharge [address] [place] [address]  22  A Fib       RARS: No data recorded     Spoke with: Attempting HFU call, unsuccessful. Message left with contact information. Information about HFU appointment for tomorrow provided as well.      Discharge department/facility: [facility]    Non-face-to-face services provided:  Scheduled appointment with [facility]  Obtained and reviewed discharge summary and/or continuity of care documents    Follow Up  Future Appointments   Date Time Provider Dasha Daley   2021  2:30 PM Candace Bumpers, MD [address] [place] [address]   2021  1:30 PM Candace Bumpers, MD [address] [place], [place] [address]   10/6/2021  1:15 PM Candace Bumpers, MD [address] [place] [address]       Luis Fernando Escamilla RN

## 2021-04-28 NOTE — CARE COORDINATION
Multiple unsuccessful attempts for HFU call. Messages left with contact information. HFU appointment scheduled for tomorrow.

## 2021-04-29 ENCOUNTER — HOSPITAL ENCOUNTER (OUTPATIENT)
Facility: HOSPITAL | Age: 61
Discharge: HOME | End: 2021-04-29
Payer: MEDICARE

## 2021-04-29 ENCOUNTER — TELEPHONE (OUTPATIENT)
Dept: FAMILY MEDICINE CLINIC | Age: 61
End: 2021-04-29

## 2021-04-29 ENCOUNTER — OFFICE VISIT (OUTPATIENT)
Dept: FAMILY MEDICINE CLINIC | Age: 61
End: 2021-04-29
Payer: MEDICARE

## 2021-04-29 VITALS
WEIGHT: 233 LBS | HEIGHT: 71 IN | DIASTOLIC BLOOD PRESSURE: 68 MMHG | TEMPERATURE: 97.2 F | OXYGEN SATURATION: 100 % | SYSTOLIC BLOOD PRESSURE: 112 MMHG | HEART RATE: 89 BPM | BODY MASS INDEX: 32.62 KG/M2 | RESPIRATION RATE: 18 BRPM

## 2021-04-29 DIAGNOSIS — E11.8 TYPE 2 DIABETES MELLITUS WITH COMPLICATION (HCC): ICD-10-CM

## 2021-04-29 DIAGNOSIS — R06.02 SOB (SHORTNESS OF BREATH): ICD-10-CM

## 2021-04-29 DIAGNOSIS — I50.9 CONGESTIVE HEART FAILURE, UNSPECIFIED HF CHRONICITY, UNSPECIFIED HEART FAILURE TYPE (HCC): ICD-10-CM

## 2021-04-29 DIAGNOSIS — N28.9 RENAL INSUFFICIENCY: ICD-10-CM

## 2021-04-29 DIAGNOSIS — N28.9 RENAL INSUFFICIENCY: Primary | ICD-10-CM

## 2021-04-29 DIAGNOSIS — I10 ESSENTIAL HYPERTENSION: ICD-10-CM

## 2021-04-29 PROCEDURE — 80053 COMPREHEN METABOLIC PANEL: CPT

## 2021-04-29 PROCEDURE — 99496 TRANSJ CARE MGMT HIGH F2F 7D: CPT | Performed by: FAMILY MEDICINE

## 2021-04-29 PROCEDURE — 85027 COMPLETE CBC AUTOMATED: CPT

## 2021-04-29 RX ORDER — APIXABAN 5 MG/1
5 TABLET, FILM COATED ORAL 2 TIMES DAILY
COMMUNITY
Start: 2021-04-22 | End: 2021-09-14 | Stop reason: DRUGHIGH

## 2021-04-29 RX ORDER — SPIRONOLACTONE 25 MG/1
25 TABLET ORAL DAILY
Qty: 30 TABLET | Refills: 3 | Status: SHIPPED | OUTPATIENT
Start: 2021-04-29 | End: 2021-05-26

## 2021-04-29 RX ORDER — METOPROLOL TARTRATE 50 MG/1
50 TABLET, FILM COATED ORAL 2 TIMES DAILY
COMMUNITY
Start: 2021-04-23 | End: 2022-06-02 | Stop reason: DRUGHIGH

## 2021-04-29 RX ORDER — FUROSEMIDE 40 MG/1
40 TABLET ORAL DAILY
COMMUNITY
Start: 2021-04-24 | End: 2021-05-26

## 2021-04-29 ASSESSMENT — ENCOUNTER SYMPTOMS
WHEEZING: 1
BACK PAIN: 1
SHORTNESS OF BREATH: 1
CHEST TIGHTNESS: 1

## 2021-04-29 NOTE — TELEPHONE ENCOUNTER
There is a possible drug interaction between Spironolactone and Tacrolimus. It could cause hyperkalemia. They are wanting to know if patient has any kidney problems.

## 2021-04-29 NOTE — PROGRESS NOTES
SUBJECTIVE:    Patient ID: Chad Burnette is a 62 y.o. male. Chief Complaint   Patient presents with    Extremity Weakness    Diabetes    Atrial Fibrillation       HPI: office visit  He is here after being in the hospital for atrial fib and swelling and shortness of breath. He is extremely weak. His blood sugars are doing relatively well. He is still having a lot of difficulty with breathing. He is using oxygen all the time. He has not had any recent falls or injuries. He still is having difficulty with just getting the fluid off. He says he is trying to watch what he is eating and drinking. He is having relatively good blood pressures. Review of Systems   Respiratory: Positive for chest tightness, shortness of breath and wheezing. Cardiovascular: Positive for leg swelling. Musculoskeletal: Positive for back pain and gait problem. All other systems reviewed and are negative. OBJECTIVE:  /68   Pulse 89   Temp 97.2 °F (36.2 °C)   Resp 18   Ht 5' 11\" (1.803 m)   Wt 233 lb (105.7 kg)   SpO2 100% Comment: 2 liters  BMI 32.50 kg/m²    Wt Readings from Last 3 Encounters:   04/29/21 233 lb (105.7 kg)   03/31/21 222 lb (100.7 kg)   03/09/21 220 lb 8 oz (100 kg)     BP Readings from Last 3 Encounters:   04/29/21 112/68   03/31/21 128/72   03/09/21 138/76      Pulse Readings from Last 3 Encounters:   04/29/21 89   04/15/21 66   03/31/21 76     Body mass index is 32.5 kg/m². Resp Readings from Last 3 Encounters:   04/29/21 18   04/15/21 20   03/31/21 18     Past medical, surgical, family and social history were reviewed and updated with the patient. Physical Exam  Vitals signs and nursing note reviewed. Constitutional:       Appearance: He is well-developed. HENT:      Head: Normocephalic and atraumatic. Right Ear: Hearing and external ear normal.      Left Ear: Hearing and external ear normal.      Nose: Nose normal.      Mouth/Throat:      Pharynx: Uvula midline. Diagnosis Orders   1. Renal insufficiency  COMPREHENSIVE METABOLIC PANEL    CBC   2. Essential hypertension     3. SOB (shortness of breath)     4. Congestive heart failure, unspecified HF chronicity, unspecified heart failure type ([facility]     5. Type 2 diabetes mellitus with complication (HCC)          PLAN:  Orders Placed This Encounter   Medications    spironolactone (ALDACTONE) 25 MG tablet     Sig: Take 1 tablet by mouth daily     Dispense:  30 tablet     Refill:  3        Medications Discontinued During This Encounter   Medication Reason    metoprolol tartrate (LOPRESSOR) 25 MG tablet LIST CLEANUP    Vitamin D (CHOLECALCIFEROL) 1000 UNITS CAPS capsule LIST CLEANUP    gabapentin (NEURONTIN) 300 MG capsule LIST CLEANUP    aspirin 325 MG tablet LIST CLEANUP    b complex vitamins capsule LIST CLEANUP    Cetirizine HCl 10 MG TBDP LIST CLEANUP    fluticasone (FLONASE) 50 MCG/ACT nasal spray LIST CLEANUP       Controlled Substances Monitoring:      Please note: This chart was generated using Dragon dictation software. Although every effort was made to ensure the accuracy of this automated transcription, some errors in transcription may have occurred.

## 2021-04-29 NOTE — PROGRESS NOTES
No chief complaint on file. Have you seen any other physician or provider since your last visit yes - [facility]    Have you had any other diagnostic tests since your last visit? yes - see list    Have you changed or stopped any medications since your last visit?  yes

## 2021-04-30 LAB
A/G RATIO: 1 (ref 0.8–2)
ALBUMIN SERPL-MCNC: 3.3 G/DL (ref 3.4–4.8)
ALP BLD-CCNC: 145 U/L (ref 25–100)
ALT SERPL-CCNC: 14 U/L (ref 4–36)
ANION GAP SERPL CALCULATED.3IONS-SCNC: 8 MMOL/L (ref 3–16)
AST SERPL-CCNC: 14 U/L (ref 8–33)
BILIRUB SERPL-MCNC: 0.6 MG/DL (ref 0.3–1.2)
BUN BLDV-MCNC: 29 MG/DL (ref 6–20)
CALCIUM SERPL-MCNC: 8.9 MG/DL (ref 8.5–10.5)
CHLORIDE BLD-SCNC: 95 MMOL/L (ref 98–107)
CO2: 33 MMOL/L (ref 20–30)
CREAT SERPL-MCNC: 1.9 MG/DL (ref 0.4–1.2)
GFR AFRICAN AMERICAN: 44
GFR NON-AFRICAN AMERICAN: 36
GLOBULIN: 3.2 G/DL
GLUCOSE BLD-MCNC: 314 MG/DL (ref 74–106)
HCT VFR BLD CALC: 42.1 % (ref 40–54)
HEMOGLOBIN: 12.9 G/DL (ref 13–18)
MCH RBC QN AUTO: 28 PG (ref 27–32)
MCHC RBC AUTO-ENTMCNC: 30.6 G/DL (ref 31–35)
MCV RBC AUTO: 91.3 FL (ref 80–100)
PDW BLD-RTO: 14.8 % (ref 11–16)
PLATELET # BLD: 144 K/UL (ref 150–400)
PMV BLD AUTO: 11.2 FL (ref 6–10)
POTASSIUM SERPL-SCNC: 4.4 MMOL/L (ref 3.4–5.1)
RBC # BLD: 4.61 M/UL (ref 4.5–6)
SODIUM BLD-SCNC: 136 MMOL/L (ref 136–145)
TOTAL PROTEIN: 6.5 G/DL (ref 6.4–8.3)
WBC # BLD: 8.2 K/UL (ref 4–11)

## 2021-05-04 RX ORDER — CEPHALEXIN 500 MG/1
500 CAPSULE ORAL 3 TIMES DAILY
Qty: 30 CAPSULE | Refills: 0 | Status: SHIPPED | OUTPATIENT
Start: 2021-05-04 | End: 2021-05-14

## 2021-05-10 ASSESSMENT — ENCOUNTER SYMPTOMS
SHORTNESS OF BREATH: 1
WHEEZING: 0
TROUBLE SWALLOWING: 0
NAUSEA: 0
SORE THROAT: 0
SINUS PAIN: 0
EYES NEGATIVE: 1
COUGH: 1
DIARRHEA: 0
VOMITING: 0
STRIDOR: 0

## 2021-05-26 ENCOUNTER — OFFICE VISIT (OUTPATIENT)
Dept: FAMILY MEDICINE CLINIC | Age: 61
End: 2021-05-26
Payer: MEDICARE

## 2021-05-26 VITALS
RESPIRATION RATE: 18 BRPM | DIASTOLIC BLOOD PRESSURE: 68 MMHG | HEART RATE: 86 BPM | WEIGHT: 209 LBS | OXYGEN SATURATION: 98 % | SYSTOLIC BLOOD PRESSURE: 100 MMHG | BODY MASS INDEX: 29.26 KG/M2 | HEIGHT: 71 IN | TEMPERATURE: 98.4 F

## 2021-05-26 DIAGNOSIS — I48.91 ATRIAL FIBRILLATION, UNSPECIFIED TYPE (HCC): ICD-10-CM

## 2021-05-26 DIAGNOSIS — I50.9 CONGESTIVE HEART FAILURE, UNSPECIFIED HF CHRONICITY, UNSPECIFIED HEART FAILURE TYPE (HCC): Primary | ICD-10-CM

## 2021-05-26 DIAGNOSIS — F41.9 ANXIETY: ICD-10-CM

## 2021-05-26 DIAGNOSIS — E11.8 TYPE 2 DIABETES MELLITUS WITH COMPLICATION (HCC): ICD-10-CM

## 2021-05-26 DIAGNOSIS — I10 ESSENTIAL HYPERTENSION: ICD-10-CM

## 2021-05-26 PROCEDURE — 99213 OFFICE O/P EST LOW 20 MIN: CPT | Performed by: FAMILY MEDICINE

## 2021-05-26 RX ORDER — TORSEMIDE 20 MG/1
20 TABLET ORAL DAILY
COMMUNITY
Start: 2021-04-30 | End: 2021-10-05 | Stop reason: ALTCHOICE

## 2021-05-26 SDOH — ECONOMIC STABILITY: TRANSPORTATION INSECURITY
IN THE PAST 12 MONTHS, HAS THE LACK OF TRANSPORTATION KEPT YOU FROM MEDICAL APPOINTMENTS OR FROM GETTING MEDICATIONS?: NO

## 2021-05-26 SDOH — ECONOMIC STABILITY: FOOD INSECURITY: WITHIN THE PAST 12 MONTHS, THE FOOD YOU BOUGHT JUST DIDN'T LAST AND YOU DIDN'T HAVE MONEY TO GET MORE.: NEVER TRUE

## 2021-05-26 SDOH — ECONOMIC STABILITY: FOOD INSECURITY: WITHIN THE PAST 12 MONTHS, YOU WORRIED THAT YOUR FOOD WOULD RUN OUT BEFORE YOU GOT MONEY TO BUY MORE.: NEVER TRUE

## 2021-05-26 SDOH — ECONOMIC STABILITY: TRANSPORTATION INSECURITY
IN THE PAST 12 MONTHS, HAS LACK OF TRANSPORTATION KEPT YOU FROM MEETINGS, WORK, OR FROM GETTING THINGS NEEDED FOR DAILY LIVING?: NO

## 2021-05-26 ASSESSMENT — ENCOUNTER SYMPTOMS
WHEEZING: 1
SHORTNESS OF BREATH: 1
BACK PAIN: 1
CHEST TIGHTNESS: 1

## 2021-05-26 ASSESSMENT — SOCIAL DETERMINANTS OF HEALTH (SDOH): HOW HARD IS IT FOR YOU TO PAY FOR THE VERY BASICS LIKE FOOD, HOUSING, MEDICAL CARE, AND HEATING?: NOT VERY HARD

## 2021-05-26 NOTE — PROGRESS NOTES
SUBJECTIVE:    Patient ID: Nickolas Barragan is a 62 y.o. male. Chief Complaint   Patient presents with    Diabetes     not checking sugar    Depression       HPI: office visit  He is in the office today complaining of continued issues with issues with fatigue. He is not quite as bad as he was. He still having a significant amount of weakness not quite as much shortness of breath. He says he is doing pretty good with his blood pressures. His blood sugars are still too high. He still not doing well with his appetite. He is not really checking his sugars like he should be. He still having quite a bit of depressive symptoms he does have a lot of issues with his mother right now. He may have to get guardianship of her. There is a lot of stress related to that. Review of Systems   Respiratory: Positive for chest tightness, shortness of breath and wheezing. Cardiovascular: Positive for leg swelling. Musculoskeletal: Positive for back pain and gait problem. All other systems reviewed and are negative. OBJECTIVE:  /68   Pulse 86   Temp 98.4 °F (36.9 °C)   Resp 18   Ht 5' 11\" (1.803 m)   Wt 209 lb (94.8 kg)   SpO2 98% Comment: ra  BMI 29.15 kg/m²    Wt Readings from Last 3 Encounters:   05/26/21 209 lb (94.8 kg)   04/29/21 233 lb (105.7 kg)   03/31/21 222 lb (100.7 kg)     BP Readings from Last 3 Encounters:   05/26/21 100/68   04/29/21 112/68   03/31/21 128/72      Pulse Readings from Last 3 Encounters:   05/26/21 86   04/29/21 89   04/15/21 66     Body mass index is 29.15 kg/m². Resp Readings from Last 3 Encounters:   05/26/21 18   04/29/21 18   04/15/21 20     Past medical, surgical, family and social history were reviewed and updated with the patient. Physical Exam  Vitals and nursing note reviewed. Constitutional:       Appearance: He is well-developed. HENT:      Head: Normocephalic and atraumatic.       Right Ear: Hearing and external ear normal.      Left Ear: Hearing and external ear normal.      Nose: Nose normal.      Mouth/Throat:      Pharynx: Uvula midline. Eyes:      General: Lids are normal.      Conjunctiva/sclera: Conjunctivae normal.      Pupils: Pupils are equal, round, and reactive to light. Neck:      Thyroid: No thyroid mass or thyromegaly. Vascular: No carotid bruit. Trachea: Trachea and phonation normal.   Cardiovascular:      Rate and Rhythm: Normal rate and regular rhythm. Pulses: Normal pulses. Heart sounds: Normal heart sounds, S1 normal and S2 normal. No murmur heard. No friction rub. No gallop. Pulmonary:      Effort: Pulmonary effort is normal.      Breath sounds: Normal breath sounds. Musculoskeletal:      Cervical back: Normal range of motion and neck supple. Spasms and tenderness present. Lumbar back: Spasms and tenderness present. Decreased range of motion. Skin:     General: Skin is warm and dry. Neurological:      Mental Status: He is alert and oriented to person, place, and time. Mental status is at baseline. Psychiatric:         Attention and Perception: Attention and perception normal.         Mood and Affect: Mood and affect normal.         Speech: Speech normal.         Behavior: Behavior is cooperative. Thought Content: Thought content normal.         Cognition and Memory: Cognition and memory normal.         Judgment: Judgment normal.          No results found for requested labs within last 30 days. Hemoglobin A1C (%)   Date Value   03/09/2021 9.6 (H)     Microalbumin, Random Urine (mg/dL)   Date Value   05/13/2020 305.10 (H)     LDL Calculated (mg/dL)   Date Value   08/14/2019 55       Lab Results   Component Value Date    WBC 8.2 04/29/2021    NEUTROABS 7.0 12/10/2020    HGB 12.9 04/29/2021    HCT 42.1 04/29/2021    MCV 91.3 04/29/2021     04/29/2021     Lab Results   Component Value Date    TSH 2.33 03/09/2021       ASSESSMENT:    Diagnosis Orders   1.  Congestive heart failure,

## 2021-06-08 ENCOUNTER — TELEPHONE (OUTPATIENT)
Dept: FAMILY MEDICINE CLINIC | Age: 61
End: 2021-06-08

## 2021-06-23 ENCOUNTER — OFFICE VISIT (OUTPATIENT)
Dept: FAMILY MEDICINE CLINIC | Age: 61
End: 2021-06-23
Payer: MEDICARE

## 2021-06-23 ENCOUNTER — HOSPITAL ENCOUNTER (OUTPATIENT)
Facility: HOSPITAL | Age: 61
Discharge: HOME | End: 2021-06-23
Payer: MEDICARE

## 2021-06-23 VITALS
BODY MASS INDEX: 29.92 KG/M2 | HEIGHT: 71 IN | DIASTOLIC BLOOD PRESSURE: 60 MMHG | SYSTOLIC BLOOD PRESSURE: 122 MMHG | HEART RATE: 69 BPM | TEMPERATURE: 97.4 F | RESPIRATION RATE: 18 BRPM | OXYGEN SATURATION: 99 % | WEIGHT: 213.7 LBS

## 2021-06-23 DIAGNOSIS — M19.90 ARTHRITIS: ICD-10-CM

## 2021-06-23 DIAGNOSIS — E11.8 TYPE 2 DIABETES MELLITUS WITH COMPLICATION (HCC): ICD-10-CM

## 2021-06-23 DIAGNOSIS — E11.8 TYPE 2 DIABETES MELLITUS WITH COMPLICATION, WITH LONG-TERM CURRENT USE OF INSULIN (HCC): ICD-10-CM

## 2021-06-23 DIAGNOSIS — I10 ESSENTIAL HYPERTENSION: ICD-10-CM

## 2021-06-23 DIAGNOSIS — Z79.4 TYPE 2 DIABETES MELLITUS WITH COMPLICATION, WITH LONG-TERM CURRENT USE OF INSULIN (HCC): ICD-10-CM

## 2021-06-23 DIAGNOSIS — I48.91 ATRIAL FIBRILLATION, UNSPECIFIED TYPE (HCC): Primary | ICD-10-CM

## 2021-06-23 LAB
A/G RATIO: 1.1 (ref 0.8–2)
ALBUMIN SERPL-MCNC: 3.9 G/DL (ref 3.4–4.8)
ALP BLD-CCNC: 293 U/L (ref 25–100)
ALT SERPL-CCNC: 35 U/L (ref 4–36)
ANION GAP SERPL CALCULATED.3IONS-SCNC: 11 MMOL/L (ref 3–16)
AST SERPL-CCNC: 29 U/L (ref 8–33)
BILIRUB SERPL-MCNC: 0.6 MG/DL (ref 0.3–1.2)
BUN BLDV-MCNC: 30 MG/DL (ref 6–20)
CALCIUM SERPL-MCNC: 9.3 MG/DL (ref 8.5–10.5)
CHLORIDE BLD-SCNC: 99 MMOL/L (ref 98–107)
CO2: 24 MMOL/L (ref 20–30)
CREAT SERPL-MCNC: 1.9 MG/DL (ref 0.4–1.2)
GFR AFRICAN AMERICAN: 44
GFR NON-AFRICAN AMERICAN: 36
GLOBULIN: 3.7 G/DL
GLUCOSE BLD-MCNC: 272 MG/DL (ref 74–106)
HBA1C MFR BLD: 9.7 %
POTASSIUM SERPL-SCNC: 5.2 MMOL/L (ref 3.4–5.1)
SODIUM BLD-SCNC: 134 MMOL/L (ref 136–145)
TOTAL PROTEIN: 7.6 G/DL (ref 6.4–8.3)

## 2021-06-23 PROCEDURE — 99214 OFFICE O/P EST MOD 30 MIN: CPT | Performed by: FAMILY MEDICINE

## 2021-06-23 PROCEDURE — 80053 COMPREHEN METABOLIC PANEL: CPT

## 2021-06-23 PROCEDURE — 83036 HEMOGLOBIN GLYCOSYLATED A1C: CPT

## 2021-06-23 ASSESSMENT — ENCOUNTER SYMPTOMS
CHEST TIGHTNESS: 1
SHORTNESS OF BREATH: 1
WHEEZING: 1
BACK PAIN: 1

## 2021-06-23 NOTE — PROGRESS NOTES
89     Body mass index is 29.81 kg/m². Resp Readings from Last 3 Encounters:   06/23/21 18   05/26/21 18   04/29/21 18     Past medical, surgical, family and social history were reviewed and updated with the patient. Physical Exam  Vitals and nursing note reviewed. Constitutional:       Appearance: He is well-developed. HENT:      Head: Normocephalic and atraumatic. Right Ear: Hearing and external ear normal.      Left Ear: Hearing and external ear normal.      Nose: Nose normal.      Mouth/Throat:      Pharynx: Uvula midline. Eyes:      General: Lids are normal.      Conjunctiva/sclera: Conjunctivae normal.      Pupils: Pupils are equal, round, and reactive to light. Neck:      Thyroid: No thyroid mass or thyromegaly. Vascular: No carotid bruit. Trachea: Trachea and phonation normal.   Cardiovascular:      Rate and Rhythm: Normal rate and regular rhythm. Pulses: Normal pulses. Heart sounds: Normal heart sounds, S1 normal and S2 normal. No murmur heard. No friction rub. No gallop. Pulmonary:      Effort: Pulmonary effort is normal.      Breath sounds: Normal breath sounds. Musculoskeletal:      Cervical back: Normal range of motion and neck supple. Spasms and tenderness present. Lumbar back: Spasms and tenderness present. Decreased range of motion. Skin:     General: Skin is warm and dry. Neurological:      Mental Status: He is alert and oriented to person, place, and time. Mental status is at baseline. Psychiatric:         Attention and Perception: Attention and perception normal.         Mood and Affect: Mood and affect normal.         Speech: Speech normal.         Behavior: Behavior is cooperative. Thought Content:  Thought content normal.         Cognition and Memory: Cognition and memory normal.         Judgment: Judgment normal.          Results in Past 30 Days  Result Component Current Result Ref Range Previous Result Ref Range Albumin/Globulin Ratio 1.1 (6/23/2021) 0.8 - 2.0 Not in Time Range    Albumin 3.9 (6/23/2021) 3.4 - 4.8 g/dL Not in Time Range    Alkaline Phosphatase 293 (H) (6/23/2021) 25 - 100 U/L Not in Time Range    ALT 35 (6/23/2021) 4 - 36 U/L Not in Time Range    AST 29 (6/23/2021) 8 - 33 U/L Not in Time Range    BUN 30 (H) (6/23/2021) 6 - 20 mg/dL Not in Time Range    Calcium 9.3 (6/23/2021) 8.5 - 10.5 mg/dL Not in Time Range    Chloride 99 (6/23/2021) 98 - 107 mmol/L Not in Time Range    CO2 24 (6/23/2021) 20 - 30 mmol/L Not in Time Range    CREATININE 1.9 (H) (6/23/2021) 0.4 - 1.2 mg/dL Not in Time Range    GFR  44 (L) (6/23/2021) >59 Not in Time Range    GFR Non- 36 (L) (6/23/2021) >59 Not in Time Range    Globulin 3.7 (6/23/2021) g/dL Not in Time Range    Glucose 272 (H) (6/23/2021) 74 - 106 mg/dL Not in Time Range    Potassium 5.2 (H) (6/23/2021) 3.4 - 5.1 mmol/L Not in Time Range    Sodium 134 (L) (6/23/2021) 136 - 145 mmol/L Not in Time Range    Total Bilirubin 0.6 (6/23/2021) 0.3 - 1.2 mg/dL Not in Time Range    Total Protein 7.6 (6/23/2021) 6.4 - 8.3 g/dL Not in Time Range      Hemoglobin A1C (%)   Date Value   06/23/2021 9.7 (H)     Microalbumin, Random Urine (mg/dL)   Date Value   05/13/2020 305.10 (H)     LDL Calculated (mg/dL)   Date Value   08/14/2019 55       Lab Results   Component Value Date    WBC 8.2 04/29/2021    NEUTROABS 7.0 12/10/2020    HGB 12.9 04/29/2021    HCT 42.1 04/29/2021    MCV 91.3 04/29/2021     04/29/2021     Lab Results   Component Value Date    TSH 2.33 03/09/2021       ASSESSMENT:    Diagnosis Orders   1. Atrial fibrillation, unspecified type ([facility] [address]     2. Type 2 diabetes mellitus with complication (HCC)  COMPREHENSIVE METABOLIC PANEL    HEMOGLOBIN A1C   3. Essential hypertension     4. Arthritis     5.  Type 2 diabetes mellitus with complication, with long-term current use of insulin (HCC)          PLAN:  No orders of the defined types were

## 2021-06-24 DIAGNOSIS — F41.9 ANXIETY: ICD-10-CM

## 2021-06-24 DIAGNOSIS — M19.90 ARTHRITIS: ICD-10-CM

## 2021-06-24 NOTE — TELEPHONE ENCOUNTER
Patient called, requested refill.        Next Office Visit Date:  Future Appointments   Date Time Provider Dasha Daley   9/23/2021 11:15 AM Medhat Naik MD [address]   10/6/2021  1:15 PM Medhat Naik MD [facility] 81 Up to date

## 2021-06-27 RX ORDER — OXYCODONE AND ACETAMINOPHEN 10; 325 MG/1; MG/1
1 TABLET ORAL EVERY 6 HOURS PRN
Qty: 90 TABLET | Refills: 0 | Status: SHIPPED | OUTPATIENT
Start: 2021-06-27 | End: 2021-10-05 | Stop reason: SDUPTHER

## 2021-06-27 RX ORDER — CLONAZEPAM 1 MG/1
1 TABLET ORAL 2 TIMES DAILY PRN
Qty: 60 TABLET | Refills: 2 | Status: SHIPPED | OUTPATIENT
Start: 2021-06-27 | End: 2021-10-05 | Stop reason: SDUPTHER

## 2021-09-03 DIAGNOSIS — U07.1 COVID-19: Primary | ICD-10-CM

## 2021-09-03 RX ORDER — PREDNISONE 1 MG/1
5 TABLET ORAL DAILY
Qty: 21 TABLET | Refills: 0 | Status: SHIPPED | OUTPATIENT
Start: 2021-09-03 | End: 2021-09-09

## 2021-09-03 RX ORDER — AZITHROMYCIN 250 MG/1
250 TABLET, FILM COATED ORAL SEE ADMIN INSTRUCTIONS
Qty: 6 TABLET | Refills: 0 | Status: SHIPPED | OUTPATIENT
Start: 2021-09-03 | End: 2021-09-08

## 2021-09-14 ENCOUNTER — OFFICE VISIT (OUTPATIENT)
Dept: FAMILY MEDICINE CLINIC | Age: 61
End: 2021-09-14
Payer: MEDICARE

## 2021-09-14 ENCOUNTER — HOSPITAL ENCOUNTER (OUTPATIENT)
Facility: HOSPITAL | Age: 61
Discharge: HOME | End: 2021-09-14
Payer: MEDICARE

## 2021-09-14 VITALS
RESPIRATION RATE: 18 BRPM | TEMPERATURE: 97.2 F | DIASTOLIC BLOOD PRESSURE: 66 MMHG | HEART RATE: 86 BPM | SYSTOLIC BLOOD PRESSURE: 118 MMHG | OXYGEN SATURATION: 95 %

## 2021-09-14 DIAGNOSIS — U07.1 COVID-19: ICD-10-CM

## 2021-09-14 DIAGNOSIS — E86.0 DEHYDRATION: ICD-10-CM

## 2021-09-14 DIAGNOSIS — R53.83 FATIGUE, UNSPECIFIED TYPE: ICD-10-CM

## 2021-09-14 DIAGNOSIS — E11.8 TYPE 2 DIABETES MELLITUS WITH COMPLICATION (HCC): ICD-10-CM

## 2021-09-14 DIAGNOSIS — I48.91 ATRIAL FIBRILLATION, UNSPECIFIED TYPE (HCC): ICD-10-CM

## 2021-09-14 DIAGNOSIS — R07.9 CHEST PAIN, UNSPECIFIED TYPE: Primary | ICD-10-CM

## 2021-09-14 DIAGNOSIS — R07.9 CHEST PAIN, UNSPECIFIED TYPE: ICD-10-CM

## 2021-09-14 DIAGNOSIS — R06.02 SOB (SHORTNESS OF BREATH): ICD-10-CM

## 2021-09-14 LAB
A/G RATIO: 0.9 (ref 0.8–2)
ALBUMIN SERPL-MCNC: 3.1 G/DL (ref 3.4–4.8)
ALP BLD-CCNC: 172 U/L (ref 25–100)
ALT SERPL-CCNC: 33 U/L (ref 4–36)
ANION GAP SERPL CALCULATED.3IONS-SCNC: 11 MMOL/L (ref 3–16)
AST SERPL-CCNC: 18 U/L (ref 8–33)
BILIRUB SERPL-MCNC: 0.5 MG/DL (ref 0.3–1.2)
BUN BLDV-MCNC: 61 MG/DL (ref 6–20)
CALCIUM SERPL-MCNC: 9.7 MG/DL (ref 8.5–10.5)
CHLORIDE BLD-SCNC: 93 MMOL/L (ref 98–107)
CO2: 21 MMOL/L (ref 20–30)
CREAT SERPL-MCNC: 2.2 MG/DL (ref 0.4–1.2)
GFR AFRICAN AMERICAN: 37
GFR NON-AFRICAN AMERICAN: 31
GLOBULIN: 3.3 G/DL
GLUCOSE BLD-MCNC: 503 MG/DL (ref 74–106)
HCT VFR BLD CALC: 41.9 % (ref 40–54)
HEMOGLOBIN: 13.9 G/DL (ref 13–18)
MCH RBC QN AUTO: 26.9 PG (ref 27–32)
MCHC RBC AUTO-ENTMCNC: 33.2 G/DL (ref 31–35)
MCV RBC AUTO: 81.2 FL (ref 80–100)
PDW BLD-RTO: 15 % (ref 11–16)
PLATELET # BLD: 125 K/UL (ref 150–400)
PMV BLD AUTO: 11 FL (ref 6–10)
POTASSIUM SERPL-SCNC: 4.3 MMOL/L (ref 3.4–5.1)
RBC # BLD: 5.16 M/UL (ref 4.5–6)
SODIUM BLD-SCNC: 125 MMOL/L (ref 136–145)
TOTAL PROTEIN: 6.4 G/DL (ref 6.4–8.3)
WBC # BLD: 9.3 K/UL (ref 4–11)

## 2021-09-14 PROCEDURE — 99214 OFFICE O/P EST MOD 30 MIN: CPT | Performed by: FAMILY MEDICINE

## 2021-09-14 PROCEDURE — 96360 HYDRATION IV INFUSION INIT: CPT | Performed by: FAMILY MEDICINE

## 2021-09-14 PROCEDURE — 80053 COMPREHEN METABOLIC PANEL: CPT

## 2021-09-14 PROCEDURE — 96361 HYDRATE IV INFUSION ADD-ON: CPT | Performed by: FAMILY MEDICINE

## 2021-09-14 PROCEDURE — 85027 COMPLETE CBC AUTOMATED: CPT

## 2021-09-14 RX ORDER — SODIUM CHLORIDE 9 MG/ML
INJECTION, SOLUTION INTRAVENOUS ONCE
Status: SHIPPED | OUTPATIENT
Start: 2021-09-14

## 2021-09-14 RX ORDER — APIXABAN 2.5 MG/1
1 TABLET, FILM COATED ORAL 2 TIMES DAILY
COMMUNITY
Start: 2021-08-13 | End: 2022-07-12 | Stop reason: SDUPTHER

## 2021-09-14 RX ORDER — ONDANSETRON 4 MG/1
4 TABLET, FILM COATED ORAL DAILY PRN
Qty: 30 TABLET | Refills: 0 | Status: SHIPPED | OUTPATIENT
Start: 2021-09-14 | End: 2021-12-15

## 2021-09-14 ASSESSMENT — PATIENT HEALTH QUESTIONNAIRE - PHQ9
SUM OF ALL RESPONSES TO PHQ QUESTIONS 1-9: 0
SUM OF ALL RESPONSES TO PHQ9 QUESTIONS 1 & 2: 0
2. FEELING DOWN, DEPRESSED OR HOPELESS: 0
SUM OF ALL RESPONSES TO PHQ QUESTIONS 1-9: 0
1. LITTLE INTEREST OR PLEASURE IN DOING THINGS: 0
SUM OF ALL RESPONSES TO PHQ QUESTIONS 1-9: 0

## 2021-09-14 ASSESSMENT — ENCOUNTER SYMPTOMS
SHORTNESS OF BREATH: 1
BACK PAIN: 1
WHEEZING: 1
CHEST TIGHTNESS: 1

## 2021-09-14 NOTE — PROGRESS NOTES
Per Midge Dance, MD order 22 G IV inserted Right w/o difficulty. IV patent/positive blood return. IV secured with tape and tegaderm. Patient tolerated procedure well. Administrations This Visit     0.9 % sodium chloride infusion     Admin Date  09/14/2021  11:10 Action  New Bag Dose  1,000 mL Rate  999 mL/hr Route  IntraVENous Site  Arm Right Administered By  Kasi Rizo RN    Ordering Provider: Midge Dance, MD    [address]: [id number]    Lot#: [id number]    : [id number] [place]    Patient Supplied?: No              1055 1 Liter NS IV Fluids Started   1229 1 Liter IV fluids completed     IV catheter discontinued intact. Site without signs and symptoms of complications. Dressing and pressure applied.

## 2021-09-14 NOTE — PROGRESS NOTES
Range    Chloride 93 (L) (9/14/2021) 98 - 107 mmol/L Not in Time Range    CO2 21 (9/14/2021) 20 - 30 mmol/L Not in Time Range    CREATININE 2.2 (H) (9/14/2021) 0.4 - 1.2 mg/dL Not in Time Range    GFR  37 (L) (9/14/2021) >59 Not in Time Range    GFR Non- 31 (L) (9/14/2021) >59 Not in Time Range    Globulin 3.3 (9/14/2021) g/dL Not in Time Range    Glucose 503 (HH) (9/14/2021) 74 - 106 mg/dL Not in Time Range    Potassium 4.3 (9/14/2021) 3.4 - 5.1 mmol/L Not in Time Range    Sodium 125 (L) (9/14/2021) 136 - 145 mmol/L Not in Time Range    Total Bilirubin 0.5 (9/14/2021) 0.3 - 1.2 mg/dL Not in Time Range    Total Protein 6.4 (9/14/2021) 6.4 - 8.3 g/dL Not in Time Range      Hemoglobin A1C (%)   Date Value   06/23/2021 9.7 (H)     Microalbumin, Random Urine (mg/dL)   Date Value   05/13/2020 305.10 (H)     LDL Calculated (mg/dL)   Date Value   08/14/2019 55       Lab Results   Component Value Date    WBC 9.3 09/14/2021    NEUTROABS 7.0 12/10/2020    HGB 13.9 09/14/2021    HCT 41.9 09/14/2021    MCV 81.2 09/14/2021     09/14/2021     Lab Results   Component Value Date    TSH 2.33 03/09/2021       ASSESSMENT:    Diagnosis Orders   1. Chest pain, unspecified type  EKG 12 Lead    CBC    COMPREHENSIVE METABOLIC PANEL   2. Dehydration  0.9 % sodium chloride infusion   3. COVID-19     4. Atrial fibrillation, unspecified type ([address]     5. Type 2 diabetes mellitus with complication (HCC)     6. SOB (shortness of breath)     7. Fatigue, unspecified type          PLAN:  Orders Placed This Encounter   Medications    0.9 % sodium chloride infusion    ondansetron (ZOFRAN) 4 MG tablet     Sig: Take 1 tablet by mouth daily as needed for Nausea or Vomiting     Dispense:  30 tablet     Refill:  0        Medications Discontinued During This Encounter   Medication Reason    ELIQUIS 5 MG TABS tablet DOSE ADJUSTMENT       Controlled Substances Monitoring:      Please note:  This chart was generated using Dragon dictation software. Although every effort was made to ensure the accuracy of this automated transcription, some errors in transcription may have occurred.

## 2021-09-16 ENCOUNTER — OFFICE VISIT (OUTPATIENT)
Dept: FAMILY MEDICINE CLINIC | Age: 61
End: 2021-09-16
Payer: MEDICARE

## 2021-09-16 VITALS
TEMPERATURE: 97.7 F | HEART RATE: 92 BPM | OXYGEN SATURATION: 96 % | DIASTOLIC BLOOD PRESSURE: 56 MMHG | RESPIRATION RATE: 18 BRPM | SYSTOLIC BLOOD PRESSURE: 100 MMHG

## 2021-09-16 DIAGNOSIS — E86.0 DEHYDRATION: Primary | ICD-10-CM

## 2021-09-16 DIAGNOSIS — R19.7 DIARRHEA, UNSPECIFIED TYPE: ICD-10-CM

## 2021-09-16 PROCEDURE — 96360 HYDRATION IV INFUSION INIT: CPT | Performed by: FAMILY MEDICINE

## 2021-09-16 PROCEDURE — 99213 OFFICE O/P EST LOW 20 MIN: CPT | Performed by: FAMILY MEDICINE

## 2021-09-16 RX ORDER — SODIUM CHLORIDE 9 MG/ML
INJECTION, SOLUTION INTRAVENOUS ONCE
Status: SHIPPED | OUTPATIENT
Start: 2021-09-16

## 2021-09-16 ASSESSMENT — PATIENT HEALTH QUESTIONNAIRE - PHQ9
SUM OF ALL RESPONSES TO PHQ QUESTIONS 1-9: 0
1. LITTLE INTEREST OR PLEASURE IN DOING THINGS: 0
SUM OF ALL RESPONSES TO PHQ QUESTIONS 1-9: 0
SUM OF ALL RESPONSES TO PHQ QUESTIONS 1-9: 0
SUM OF ALL RESPONSES TO PHQ9 QUESTIONS 1 & 2: 0
2. FEELING DOWN, DEPRESSED OR HOPELESS: 0

## 2021-09-16 NOTE — PROGRESS NOTES
Per Carlton Urbano MD order 22 G IV inserted Right AC  w/o difficulty. IV patent/positive blood return. IV secured with tape and tegaderm. Patient tolerated procedure well. 1320 1 Liter Normal Saline IV Fluids started   1426 1 Liter IV Fluids Completed    IV catheter discontinued intact. Site without signs and symptoms of complications. Dressing and pressure applied.

## 2021-09-21 ASSESSMENT — ENCOUNTER SYMPTOMS
WHEEZING: 1
SHORTNESS OF BREATH: 1
BACK PAIN: 1
CHEST TIGHTNESS: 1

## 2021-09-22 NOTE — PROGRESS NOTES
SUBJECTIVE:    Patient ID: Eva Palomo is a 62 y.o. male. Chief Complaint   Patient presents with    Dehydration     \"may need more fluids\"    Diarrhea    Extremity Weakness       HPI: office visit  He still having a lot of weakness. He still having some diarrhea. He still have a lot of weakness and fatigue. He has not had any chest pain. Is not had any increase in shortness of breath. His blood pressures have been a little better. He still having high blood sugars. He is not eating much at all. Review of Systems   Respiratory: Positive for chest tightness, shortness of breath and wheezing. Cardiovascular: Positive for leg swelling. Musculoskeletal: Positive for back pain and gait problem. All other systems reviewed and are negative. OBJECTIVE:  BP (!) 100/56   Pulse 92   Temp 97.7 °F (36.5 °C) (Infrared)   Resp 18   SpO2 96% Comment: room air   Wt Readings from Last 3 Encounters:   06/23/21 213 lb 11.2 oz (96.9 kg)   05/26/21 209 lb (94.8 kg)   04/29/21 233 lb (105.7 kg)     BP Readings from Last 3 Encounters:   09/16/21 (!) 100/56   09/14/21 118/66   06/23/21 122/60      Pulse Readings from Last 3 Encounters:   09/16/21 92   09/14/21 86   06/23/21 69     There is no height or weight on file to calculate BMI. Resp Readings from Last 3 Encounters:   09/16/21 18   09/14/21 18   06/23/21 18     Past medical, surgical, family and social history were reviewed and updated with the patient. Physical Exam  Vitals and nursing note reviewed. Constitutional:       Appearance: He is well-developed. HENT:      Head: Normocephalic and atraumatic. Right Ear: Hearing and external ear normal.      Left Ear: Hearing and external ear normal.      Nose: Nose normal.      Mouth/Throat:      Pharynx: Uvula midline. Eyes:      General: Lids are normal.      Conjunctiva/sclera: Conjunctivae normal.      Pupils: Pupils are equal, round, and reactive to light.    Neck:      Thyroid: No thyroid mass or thyromegaly. Vascular: No carotid bruit. Trachea: Trachea and phonation normal.   Cardiovascular:      Rate and Rhythm: Normal rate and regular rhythm. Pulses: Normal pulses. Heart sounds: Normal heart sounds, S1 normal and S2 normal. No murmur heard. No friction rub. No gallop. Pulmonary:      Effort: Pulmonary effort is normal.      Breath sounds: Normal breath sounds. Musculoskeletal:      Cervical back: Normal range of motion and neck supple. Spasms and tenderness present. Lumbar back: Spasms and tenderness present. Decreased range of motion. Skin:     General: Skin is warm and dry. Neurological:      Mental Status: He is alert and oriented to person, place, and time. Mental status is at baseline. Psychiatric:         Attention and Perception: Attention and perception normal.         Mood and Affect: Mood and affect normal.         Speech: Speech normal.         Behavior: Behavior is cooperative. Thought Content:  Thought content normal.         Cognition and Memory: Cognition and memory normal.         Judgment: Judgment normal.          Results in Past 30 Days  Result Component Current Result Ref Range Previous Result Ref Range   Albumin/Globulin Ratio 0.9 (9/14/2021) 0.8 - 2.0 Not in Time Range    Albumin 3.1 (L) (9/14/2021) 3.4 - 4.8 g/dL Not in Time Range    Alkaline Phosphatase 172 (H) (9/14/2021) 25 - 100 U/L Not in Time Range    ALT 33 (9/14/2021) 4 - 36 U/L Not in Time Range    AST 18 (9/14/2021) 8 - 33 U/L Not in Time Range    BUN 61 (H) (9/14/2021) 6 - 20 mg/dL Not in Time Range    Calcium 9.7 (9/14/2021) 8.5 - 10.5 mg/dL Not in Time Range    Chloride 93 (L) (9/14/2021) 98 - 107 mmol/L Not in Time Range    CO2 21 (9/14/2021) 20 - 30 mmol/L Not in Time Range    CREATININE 2.2 (H) (9/14/2021) 0.4 - 1.2 mg/dL Not in Time Range    GFR  37 (L) (9/14/2021) >59 Not in Time Range    GFR Non- 31 (L) (9/14/2021) >59 Not in Time Range    Globulin 3.3 (9/14/2021) g/dL Not in Time Range    Glucose 503 (HH) (9/14/2021) 74 - 106 mg/dL Not in Time Range    Potassium 4.3 (9/14/2021) 3.4 - 5.1 mmol/L Not in Time Range    Sodium 125 (L) (9/14/2021) 136 - 145 mmol/L Not in Time Range    Total Bilirubin 0.5 (9/14/2021) 0.3 - 1.2 mg/dL Not in Time Range    Total Protein 6.4 (9/14/2021) 6.4 - 8.3 g/dL Not in Time Range      Hemoglobin A1C (%)   Date Value   06/23/2021 9.7 (H)     Microalbumin, Random Urine (mg/dL)   Date Value   05/13/2020 305.10 (H)     LDL Calculated (mg/dL)   Date Value   08/14/2019 55       Lab Results   Component Value Date    WBC 9.3 09/14/2021    NEUTROABS 7.0 12/10/2020    HGB 13.9 09/14/2021    HCT 41.9 09/14/2021    MCV 81.2 09/14/2021     09/14/2021     Lab Results   Component Value Date    TSH 2.33 03/09/2021       ASSESSMENT:    Diagnosis Orders   1. Dehydration  0.9 % sodium chloride infusion   2. Diarrhea, unspecified type  0.9 % sodium chloride infusion        PLAN:  Orders Placed This Encounter   Medications    0.9 % sodium chloride infusion        There are no discontinued medications. Controlled Substances Monitoring:      Please note: This chart was generated using Dragon dictation software. Although every effort was made to ensure the accuracy of this automated transcription, some errors in transcription may have occurred.

## 2021-10-02 ENCOUNTER — TELEPHONE (OUTPATIENT)
Dept: PRIMARY CARE CLINIC | Age: 61
End: 2021-10-02

## 2021-10-02 NOTE — TELEPHONE ENCOUNTER
Called patient to discuss options for Colon Cancer screenings. Previously patient had completed FIT test but screening is now overdur. No answer, left message for patient to contact the office. Fall Risk

## 2021-10-05 ENCOUNTER — OFFICE VISIT (OUTPATIENT)
Dept: FAMILY MEDICINE CLINIC | Age: 61
End: 2021-10-05
Payer: MEDICARE

## 2021-10-05 ENCOUNTER — HOSPITAL ENCOUNTER (OUTPATIENT)
Facility: HOSPITAL | Age: 61
Discharge: HOME | End: 2021-10-05
Payer: MEDICARE

## 2021-10-05 VITALS
WEIGHT: 205 LBS | OXYGEN SATURATION: 97 % | HEART RATE: 62 BPM | BODY MASS INDEX: 28.7 KG/M2 | HEIGHT: 71 IN | TEMPERATURE: 97.9 F | RESPIRATION RATE: 18 BRPM | DIASTOLIC BLOOD PRESSURE: 60 MMHG | SYSTOLIC BLOOD PRESSURE: 102 MMHG

## 2021-10-05 DIAGNOSIS — I48.91 ATRIAL FIBRILLATION, UNSPECIFIED TYPE (HCC): ICD-10-CM

## 2021-10-05 DIAGNOSIS — E11.8 TYPE 2 DIABETES MELLITUS WITH COMPLICATION (HCC): ICD-10-CM

## 2021-10-05 DIAGNOSIS — F41.9 ANXIETY: ICD-10-CM

## 2021-10-05 DIAGNOSIS — Z12.5 ENCOUNTER FOR PROSTATE CANCER SCREENING: ICD-10-CM

## 2021-10-05 DIAGNOSIS — R06.02 SOB (SHORTNESS OF BREATH): ICD-10-CM

## 2021-10-05 DIAGNOSIS — R53.83 FATIGUE, UNSPECIFIED TYPE: ICD-10-CM

## 2021-10-05 DIAGNOSIS — E86.0 DEHYDRATION: Primary | ICD-10-CM

## 2021-10-05 DIAGNOSIS — M19.90 ARTHRITIS: ICD-10-CM

## 2021-10-05 DIAGNOSIS — I50.9 CONGESTIVE HEART FAILURE, UNSPECIFIED HF CHRONICITY, UNSPECIFIED HEART FAILURE TYPE (HCC): ICD-10-CM

## 2021-10-05 PROCEDURE — 85027 COMPLETE CBC AUTOMATED: CPT

## 2021-10-05 PROCEDURE — 99214 OFFICE O/P EST MOD 30 MIN: CPT | Performed by: FAMILY MEDICINE

## 2021-10-05 PROCEDURE — 84153 ASSAY OF PSA TOTAL: CPT

## 2021-10-05 PROCEDURE — 84443 ASSAY THYROID STIM HORMONE: CPT

## 2021-10-05 PROCEDURE — 83036 HEMOGLOBIN GLYCOSYLATED A1C: CPT

## 2021-10-05 PROCEDURE — 80053 COMPREHEN METABOLIC PANEL: CPT

## 2021-10-05 PROCEDURE — 82977 ASSAY OF GGT: CPT

## 2021-10-05 PROCEDURE — 83735 ASSAY OF MAGNESIUM: CPT

## 2021-10-05 RX ORDER — ALBUTEROL SULFATE 2.5 MG/3ML
2.5 SOLUTION RESPIRATORY (INHALATION) EVERY 4 HOURS PRN
Qty: 25 EACH | Refills: 2 | Status: SHIPPED | OUTPATIENT
Start: 2021-10-05 | End: 2022-07-12 | Stop reason: SDUPTHER

## 2021-10-05 RX ORDER — CLONAZEPAM 1 MG/1
1 TABLET ORAL 2 TIMES DAILY PRN
Qty: 60 TABLET | Refills: 2 | Status: SHIPPED | OUTPATIENT
Start: 2021-10-05 | End: 2021-12-15 | Stop reason: SDUPTHER

## 2021-10-05 RX ORDER — OXYCODONE AND ACETAMINOPHEN 10; 325 MG/1; MG/1
1 TABLET ORAL EVERY 6 HOURS PRN
Qty: 90 TABLET | Refills: 0 | Status: SHIPPED | OUTPATIENT
Start: 2021-10-05 | End: 2021-12-15 | Stop reason: SDUPTHER

## 2021-10-05 RX ORDER — DOFETILIDE 0.25 MG/1
250 CAPSULE ORAL 2 TIMES DAILY
COMMUNITY

## 2021-10-05 ASSESSMENT — PATIENT HEALTH QUESTIONNAIRE - PHQ9
SUM OF ALL RESPONSES TO PHQ QUESTIONS 1-9: 0
SUM OF ALL RESPONSES TO PHQ QUESTIONS 1-9: 0
1. LITTLE INTEREST OR PLEASURE IN DOING THINGS: 0
SUM OF ALL RESPONSES TO PHQ QUESTIONS 1-9: 0
2. FEELING DOWN, DEPRESSED OR HOPELESS: 0
SUM OF ALL RESPONSES TO PHQ9 QUESTIONS 1 & 2: 0

## 2021-10-05 ASSESSMENT — ENCOUNTER SYMPTOMS
CHEST TIGHTNESS: 1
WHEEZING: 1
SHORTNESS OF BREATH: 1
BACK PAIN: 1

## 2021-10-05 NOTE — PROGRESS NOTES
SUBJECTIVE:    Patient ID: Rica Claude is a 62 y.o. male. Chief Complaint   Patient presents with   [facility]     \"my voice stays hoarse all the time\"     Headache     \"getting severe headaches at night\" the oxycodone is the only thing that relieves it     Fatigue     \"still pretty weak\"     Abscess     genital area - \"been there for about month\"     Atrial Fibrillation     Cardiac Ablation on hold for now - had cardioversion 9/22/21       HPI: office visit  He still struggling quite a bit with fatigue. He still having some shortness of breath. He still not eating quite as well as he did. He is trying to drink. He says he just does not feel well. Is not having abdominal pain anymore. He is not having any significant cardiac issues. His cardioversion seems to have worked. He has not had any significant increase in swelling. Is not had any chest pain or new issues. He still not really taking his insulin. He is not really checking his blood sugars. He says honestly just does not care about that. He is still struggling with a lot of depressive symptoms. He says he just feels bad. He has had a sebaceous cyst on his coccyx that he has essentially drained himself at this moment and it is down. He is not have any significant pain redness or other symptoms with that. He is having some headaches when he wakes up in the morning. He has not been using his oxygen regularly. I discussed with him that I think that the low oxygen is the reason for the headaches and I do think he needs to be wearing the oxygen all the time when he is asleep. Review of Systems   Respiratory: Positive for chest tightness, shortness of breath and wheezing. Cardiovascular: Positive for leg swelling. Musculoskeletal: Positive for back pain and gait problem. All other systems reviewed and are negative.        OBJECTIVE:  /60   Pulse 62   Temp 97.9 °F (36.6 °C) (Infrared)   Resp 18   Ht 5' 11\" (1.803 m)   Wt 205 lb (93 kg)   SpO2 97% Comment: room air  BMI 28.59 kg/m²    Wt Readings from Last 3 Encounters:   10/05/21 205 lb (93 kg)   06/23/21 213 lb 11.2 oz (96.9 kg)   05/26/21 209 lb (94.8 kg)     BP Readings from Last 3 Encounters:   10/05/21 102/60   09/16/21 (!) 100/56   09/14/21 118/66      Pulse Readings from Last 3 Encounters:   10/05/21 62   09/16/21 92   09/14/21 86     Body mass index is 28.59 kg/m². Resp Readings from Last 3 Encounters:   10/05/21 18   09/16/21 18   09/14/21 18     Past medical, surgical, family and social history were reviewed and updated with the patient. Physical Exam  Vitals and nursing note reviewed. Constitutional:       Appearance: He is well-developed. HENT:      Head: Normocephalic and atraumatic. Right Ear: Hearing and external ear normal.      Left Ear: Hearing and external ear normal.      Nose: Nose normal.      Mouth/Throat:      Pharynx: Uvula midline. Eyes:      General: Lids are normal.      Conjunctiva/sclera: Conjunctivae normal.      Pupils: Pupils are equal, round, and reactive to light. Neck:      Thyroid: No thyroid mass or thyromegaly. Vascular: No carotid bruit. Trachea: Trachea and phonation normal.   Cardiovascular:      Rate and Rhythm: Normal rate and regular rhythm. Pulses: Normal pulses. Heart sounds: Normal heart sounds, S1 normal and S2 normal. No murmur heard. No friction rub. No gallop. Pulmonary:      Effort: Pulmonary effort is normal.      Breath sounds: Normal breath sounds. Musculoskeletal:      Cervical back: Normal range of motion and neck supple. Spasms and tenderness present. Lumbar back: Spasms and tenderness present. Decreased range of motion. Skin:     General: Skin is warm and dry. Neurological:      Mental Status: He is alert and oriented to person, place, and time. Mental status is at baseline.    Psychiatric:         Attention and Perception: Attention and perception normal.         Mood and Affect: Mood and affect normal.         Speech: Speech normal.         Behavior: Behavior is cooperative. Thought Content:  Thought content normal.         Cognition and Memory: Cognition and memory normal.         Judgment: Judgment normal.          Results in Past 30 Days  Result Component Current Result Ref Range Previous Result Ref Range   Albumin/Globulin Ratio 1.0 (10/5/2021) 0.8 - 2.0 0.9 (9/14/2021) 0.8 - 2.0   Albumin 3.0 (L) (10/5/2021) 3.4 - 4.8 g/dL 3.1 (L) (9/14/2021) 3.4 - 4.8 g/dL   Alkaline Phosphatase 266 (H) (10/5/2021) 25 - 100 U/L 172 (H) (9/14/2021) 25 - 100 U/L   ALT 23 (10/5/2021) 4 - 36 U/L 33 (9/14/2021) 4 - 36 U/L   AST 21 (10/5/2021) 8 - 33 U/L 18 (9/14/2021) 8 - 33 U/L   BUN 22 (H) (10/5/2021) 6 - 20 mg/dL 61 (H) (9/14/2021) 6 - 20 mg/dL   Calcium 8.7 (10/5/2021) 8.5 - 10.5 mg/dL 9.7 (9/14/2021) 8.5 - 10.5 mg/dL   Chloride 98 (10/5/2021) 98 - 107 mmol/L 93 (L) (9/14/2021) 98 - 107 mmol/L   CO2 25 (10/5/2021) 20 - 30 mmol/L 21 (9/14/2021) 20 - 30 mmol/L   CREATININE 1.7 (H) (10/5/2021) 0.4 - 1.2 mg/dL 2.2 (H) (9/14/2021) 0.4 - 1.2 mg/dL   GFR  50 (L) (10/5/2021) >59 37 (L) (9/14/2021) >59   GFR Non- 41 (L) (10/5/2021) >59 31 (L) (9/14/2021) >59   Globulin 3.1 (10/5/2021) g/dL 3.3 (9/14/2021) g/dL   Glucose 181 (H) (10/5/2021) 74 - 106 mg/dL 503 (HH) (9/14/2021) 74 - 106 mg/dL   Potassium 5.0 (10/5/2021) 3.4 - 5.1 mmol/L 4.3 (9/14/2021) 3.4 - 5.1 mmol/L   Sodium 134 (L) (10/5/2021) 136 - 145 mmol/L 125 (L) (9/14/2021) 136 - 145 mmol/L   Total Bilirubin 0.6 (10/5/2021) 0.3 - 1.2 mg/dL 0.5 (9/14/2021) 0.3 - 1.2 mg/dL   Total Protein 6.1 (L) (10/5/2021) 6.4 - 8.3 g/dL 6.4 (9/14/2021) 6.4 - 8.3 g/dL     Hemoglobin A1C (%)   Date Value   10/05/2021 9.1 (H)     Microalbumin, Random Urine (mg/dL)   Date Value   05/13/2020 305.10 (H)     LDL Calculated (mg/dL)   Date Value   08/14/2019 55       Lab Results   Component Value Date    WBC 8.8 10/05/2021 NEUTROABS 7.0 12/10/2020    HGB 11.3 10/05/2021    HCT 36.9 10/05/2021    MCV 87.2 10/05/2021     10/05/2021     Lab Results   Component Value Date    TSH 5.83 (H) 10/05/2021       ASSESSMENT:    Diagnosis Orders   1. Dehydration     2. Arthritis  oxyCODONE-acetaminophen (PERCOCET)  MG per tablet   3. Anxiety  clonazePAM (KLONOPIN) 1 MG tablet   4. SOB (shortness of breath)     5. Type 2 diabetes mellitus with complication (HCC)  COMPREHENSIVE METABOLIC PANEL    HEMOGLOBIN A1C   6. Atrial fibrillation, unspecified type ([facility])  CBC   7. Congestive heart failure, unspecified HF chronicity, unspecified heart failure type ([facility] [address]     8. Encounter for prostate cancer screening  PSA screening   9. Fatigue, unspecified type  TSH without Reflex        PLAN:  Orders Placed This Encounter   Medications    oxyCODONE-acetaminophen (PERCOCET)  MG per tablet     Sig: Take 1 tablet by mouth every 6 hours as needed for Pain for up to 30 days. Dispense:  90 tablet     Refill:  0     Reduce doses taken as pain becomes manageable    clonazePAM (KLONOPIN) 1 MG tablet     Sig: Take 1 tablet by mouth 2 times daily as needed for Anxiety for up to 30 days. Dispense:  60 tablet     Refill:  2    albuterol (PROVENTIL) (2.5 MG/3ML) 0.083% nebulizer solution     Sig: Take 3 mLs by nebulization every 4 hours as needed for Wheezing     Dispense:  25 each     Refill:  2        Medications Discontinued During This Encounter   Medication Reason    torsemide (DEMADEX) 20 MG tablet Therapy completed    oxyCODONE-acetaminophen (PERCOCET)  MG per tablet REORDER    clonazePAM (KLONOPIN) 1 MG tablet REORDER       Controlled Substances Monitoring:      Please note: This chart was generated using Dragon dictation software. Although every effort was made to ensure the accuracy of this automated transcription, some errors in transcription may have occurred.

## 2021-10-06 LAB
A/G RATIO: 1 (ref 0.8–2)
ALBUMIN SERPL-MCNC: 3 G/DL (ref 3.4–4.8)
ALP BLD-CCNC: 266 U/L (ref 25–100)
ALT SERPL-CCNC: 23 U/L (ref 4–36)
ANION GAP SERPL CALCULATED.3IONS-SCNC: 11 MMOL/L (ref 3–16)
AST SERPL-CCNC: 21 U/L (ref 8–33)
BILIRUB SERPL-MCNC: 0.6 MG/DL (ref 0.3–1.2)
BUN BLDV-MCNC: 22 MG/DL (ref 6–20)
CALCIUM SERPL-MCNC: 8.7 MG/DL (ref 8.5–10.5)
CHLORIDE BLD-SCNC: 98 MMOL/L (ref 98–107)
CO2: 25 MMOL/L (ref 20–30)
CREAT SERPL-MCNC: 1.7 MG/DL (ref 0.4–1.2)
GAMMA GLUTAMYL TRANSFERASE: 299 U/L (ref 8–61)
GFR AFRICAN AMERICAN: 50
GFR NON-AFRICAN AMERICAN: 41
GLOBULIN: 3.1 G/DL
GLUCOSE BLD-MCNC: 181 MG/DL (ref 74–106)
HBA1C MFR BLD: 9.1 %
HCT VFR BLD CALC: 36.9 % (ref 40–54)
HEMOGLOBIN: 11.3 G/DL (ref 13–18)
MAGNESIUM: 1.8 MG/DL (ref 1.7–2.4)
MCH RBC QN AUTO: 26.7 PG (ref 27–32)
MCHC RBC AUTO-ENTMCNC: 30.6 G/DL (ref 31–35)
MCV RBC AUTO: 87.2 FL (ref 80–100)
PDW BLD-RTO: 15.8 % (ref 11–16)
PLATELET # BLD: 308 K/UL (ref 150–400)
PMV BLD AUTO: 10.3 FL (ref 6–10)
POTASSIUM SERPL-SCNC: 5 MMOL/L (ref 3.4–5.1)
PROSTATE SPECIFIC ANTIGEN: 0.04 NG/ML (ref 0–4)
RBC # BLD: 4.23 M/UL (ref 4.5–6)
SODIUM BLD-SCNC: 134 MMOL/L (ref 136–145)
TOTAL PROTEIN: 6.1 G/DL (ref 6.4–8.3)
TSH SERPL DL<=0.05 MIU/L-ACNC: 5.83 UIU/ML (ref 0.27–4.2)
WBC # BLD: 8.8 K/UL (ref 4–11)

## 2021-10-20 RX ORDER — PREDNISONE 5 MG/1
TABLET ORAL
OUTPATIENT
Start: 2021-10-20

## 2021-10-20 NOTE — TELEPHONE ENCOUNTER
Patient called, requested refill.        Next Office Visit Date:  Future Appointments   Date Time Provider Dasha Daley   11/2/2021  5:00 PM Abby Rodrigues MD [address] please review via [address]

## 2021-11-02 ENCOUNTER — OFFICE VISIT (OUTPATIENT)
Dept: FAMILY MEDICINE CLINIC | Age: 61
End: 2021-11-02
Payer: MEDICARE

## 2021-11-02 VITALS
HEIGHT: 71 IN | TEMPERATURE: 98 F | SYSTOLIC BLOOD PRESSURE: 132 MMHG | DIASTOLIC BLOOD PRESSURE: 80 MMHG | OXYGEN SATURATION: 100 % | WEIGHT: 208.4 LBS | HEART RATE: 65 BPM | BODY MASS INDEX: 29.18 KG/M2

## 2021-11-02 DIAGNOSIS — I48.91 ATRIAL FIBRILLATION, UNSPECIFIED TYPE (HCC): ICD-10-CM

## 2021-11-02 DIAGNOSIS — M19.90 ARTHRITIS: ICD-10-CM

## 2021-11-02 DIAGNOSIS — U07.1 COVID-19: ICD-10-CM

## 2021-11-02 DIAGNOSIS — F41.9 ANXIETY: ICD-10-CM

## 2021-11-02 DIAGNOSIS — I10 ESSENTIAL HYPERTENSION: ICD-10-CM

## 2021-11-02 DIAGNOSIS — E11.8 TYPE 2 DIABETES MELLITUS WITH COMPLICATION (HCC): Primary | ICD-10-CM

## 2021-11-02 PROCEDURE — 99214 OFFICE O/P EST MOD 30 MIN: CPT | Performed by: FAMILY MEDICINE

## 2021-11-02 RX ORDER — OXYCODONE AND ACETAMINOPHEN 10; 325 MG/1; MG/1
1 TABLET ORAL EVERY 6 HOURS PRN
Qty: 90 TABLET | Refills: 0 | Status: CANCELLED | OUTPATIENT
Start: 2021-11-02 | End: 2021-12-02

## 2021-11-02 RX ORDER — PANTOPRAZOLE SODIUM 40 MG/1
40 TABLET, DELAYED RELEASE ORAL
Qty: 30 TABLET | Refills: 5 | Status: SHIPPED | OUTPATIENT
Start: 2021-11-02 | End: 2022-07-12 | Stop reason: SDUPTHER

## 2021-11-02 ASSESSMENT — ENCOUNTER SYMPTOMS
WHEEZING: 1
BACK PAIN: 1
SHORTNESS OF BREATH: 1
CHEST TIGHTNESS: 1

## 2021-11-02 NOTE — PROGRESS NOTES
Chief Complaint   Patient presents with    Hypertension    Diabetes     Pt here today for f/u on htn and diabetes    Have you seen any other physician or provider since your last visit yes - cardiology    Have you had any other diagnostic tests since your last visit? no    Have you changed or stopped any medications since your last visit? no

## 2021-11-02 NOTE — PROGRESS NOTES
SUBJECTIVE:    Patient ID: Ren Davis is a 62 y.o. male. Chief Complaint   Patient presents with    Hypertension    Diabetes       HPI: office visit  He is in the office today in follow-up of his diabetes. He is doing a little bit better with his blood sugar averages. He still struggling with his diet. He still having a lot of fatigue from Covid. He still having some shortness of breath though not as bad. His atrial fib seems to be in control at least with rate control at this time. Is not have any significant amount of swelling. Is not had any significant high blood pressures. He has not had any chest pain. He says he feels relatively better than he did but still not normal. He still struggling with some anxiety though he does feel like he is a little better. Review of Systems   Respiratory: Positive for chest tightness, shortness of breath and wheezing. Cardiovascular: Positive for leg swelling. Musculoskeletal: Positive for back pain and gait problem. All other systems reviewed and are negative. OBJECTIVE:  /80 (Site: Right Upper Arm, Position: Sitting)   Pulse 65   Temp 98 °F (36.7 °C) (Temporal)   Ht 5' 11\" (1.803 m)   Wt 208 lb 6.4 oz (94.5 kg)   SpO2 100%   BMI 29.07 kg/m²    Wt Readings from Last 3 Encounters:   11/02/21 208 lb 6.4 oz (94.5 kg)   10/05/21 205 lb (93 kg)   06/23/21 213 lb 11.2 oz (96.9 kg)     BP Readings from Last 3 Encounters:   11/02/21 132/80   10/05/21 102/60   09/16/21 (!) 100/56      Pulse Readings from Last 3 Encounters:   11/02/21 65   10/05/21 62   09/16/21 92     Body mass index is 29.07 kg/m². Resp Readings from Last 3 Encounters:   10/05/21 18   09/16/21 18   09/14/21 18     Past medical, surgical, family and social history were reviewed and updated with the patient. Physical Exam  Vitals and nursing note reviewed. Constitutional:       Appearance: He is well-developed. HENT:      Head: Normocephalic and atraumatic.       Right Ear: Hearing and external ear normal.      Left Ear: Hearing and external ear normal.      Nose: Nose normal.      Mouth/Throat:      Pharynx: Uvula midline. Eyes:      General: Lids are normal.      Conjunctiva/sclera: Conjunctivae normal.      Pupils: Pupils are equal, round, and reactive to light. Neck:      Thyroid: No thyroid mass or thyromegaly. Vascular: No carotid bruit. Trachea: Trachea and phonation normal.   Cardiovascular:      Rate and Rhythm: Normal rate and regular rhythm. Pulses: Normal pulses. Heart sounds: Normal heart sounds, S1 normal and S2 normal. No murmur heard. No friction rub. No gallop. Pulmonary:      Effort: Pulmonary effort is normal.      Breath sounds: Normal breath sounds. Musculoskeletal:      Cervical back: Normal range of motion and neck supple. Spasms and tenderness present. Lumbar back: Spasms and tenderness present. Decreased range of motion. Skin:     General: Skin is warm and dry. Neurological:      Mental Status: He is alert and oriented to person, place, and time. Mental status is at baseline. Psychiatric:         Attention and Perception: Attention and perception normal.         Mood and Affect: Mood and affect normal.         Speech: Speech normal.         Behavior: Behavior is cooperative. Thought Content: Thought content normal.         Cognition and Memory: Cognition and memory normal.         Judgment: Judgment normal.          No results found for requested labs within last 30 days.      Hemoglobin A1C (%)   Date Value   10/05/2021 9.1 (H)     Microalbumin, Random Urine (mg/dL)   Date Value   05/13/2020 305.10 (H)     LDL Calculated (mg/dL)   Date Value   08/14/2019 55       Lab Results   Component Value Date    WBC 8.8 10/05/2021    NEUTROABS 7.0 12/10/2020    HGB 11.3 10/05/2021    HCT 36.9 10/05/2021    MCV 87.2 10/05/2021     10/05/2021     Lab Results   Component Value Date    TSH 5.83 (H) 10/05/2021 ASSESSMENT:    Diagnosis Orders   1. Type 2 diabetes mellitus with complication (HCC)     2. Arthritis     3. Anxiety     4. Atrial fibrillation, unspecified type ([facility] [address]     5. Essential hypertension     6. COVID-19          PLAN:  Orders Placed This Encounter   Medications    pantoprazole (PROTONIX) 40 MG tablet     Sig: Take 1 tablet by mouth daily (with breakfast)     Dispense:  30 tablet     Refill:  5        There are no discontinued medications. Controlled Substances Monitoring:      Please note: This chart was generated using Dragon dictation software. Although every effort was made to ensure the accuracy of this automated transcription, some errors in transcription may have occurred.

## 2021-12-15 ENCOUNTER — HOSPITAL ENCOUNTER (OUTPATIENT)
Facility: HOSPITAL | Age: 61
Discharge: HOME | End: 2021-12-15
Payer: MEDICARE

## 2021-12-15 ENCOUNTER — OFFICE VISIT (OUTPATIENT)
Dept: FAMILY MEDICINE CLINIC | Age: 61
End: 2021-12-15
Payer: MEDICARE

## 2021-12-15 VITALS
DIASTOLIC BLOOD PRESSURE: 68 MMHG | SYSTOLIC BLOOD PRESSURE: 122 MMHG | HEART RATE: 76 BPM | HEIGHT: 71 IN | RESPIRATION RATE: 18 BRPM | TEMPERATURE: 97.9 F | BODY MASS INDEX: 29.68 KG/M2 | WEIGHT: 212 LBS | OXYGEN SATURATION: 98 %

## 2021-12-15 DIAGNOSIS — I10 ESSENTIAL HYPERTENSION: ICD-10-CM

## 2021-12-15 DIAGNOSIS — R53.83 FATIGUE, UNSPECIFIED TYPE: ICD-10-CM

## 2021-12-15 DIAGNOSIS — F41.9 ANXIETY: ICD-10-CM

## 2021-12-15 DIAGNOSIS — E11.8 TYPE 2 DIABETES MELLITUS WITH COMPLICATION (HCC): Primary | ICD-10-CM

## 2021-12-15 DIAGNOSIS — I48.91 ATRIAL FIBRILLATION, UNSPECIFIED TYPE (HCC): ICD-10-CM

## 2021-12-15 DIAGNOSIS — Z94.4 HISTORY OF LIVER TRANSPLANT (HCC): ICD-10-CM

## 2021-12-15 DIAGNOSIS — E11.8 TYPE 2 DIABETES MELLITUS WITH COMPLICATION (HCC): ICD-10-CM

## 2021-12-15 DIAGNOSIS — I50.9 CONGESTIVE HEART FAILURE, UNSPECIFIED HF CHRONICITY, UNSPECIFIED HEART FAILURE TYPE (HCC): ICD-10-CM

## 2021-12-15 DIAGNOSIS — M19.90 ARTHRITIS: ICD-10-CM

## 2021-12-15 PROCEDURE — 99214 OFFICE O/P EST MOD 30 MIN: CPT | Performed by: FAMILY MEDICINE

## 2021-12-15 PROCEDURE — 82570 ASSAY OF URINE CREATININE: CPT

## 2021-12-15 PROCEDURE — 82043 UR ALBUMIN QUANTITATIVE: CPT

## 2021-12-15 RX ORDER — PRAVASTATIN SODIUM 40 MG
TABLET ORAL
Qty: 90 TABLET | Refills: 3 | Status: SHIPPED | OUTPATIENT
Start: 2021-12-15 | End: 2022-07-12 | Stop reason: SDUPTHER

## 2021-12-15 RX ORDER — CLONAZEPAM 1 MG/1
1 TABLET ORAL 2 TIMES DAILY PRN
Qty: 60 TABLET | Refills: 2 | Status: SHIPPED | OUTPATIENT
Start: 2021-12-15 | End: 2022-04-12 | Stop reason: SDUPTHER

## 2021-12-15 RX ORDER — OXYCODONE AND ACETAMINOPHEN 10; 325 MG/1; MG/1
1 TABLET ORAL EVERY 6 HOURS PRN
Qty: 90 TABLET | Refills: 0 | Status: SHIPPED | OUTPATIENT
Start: 2021-12-15 | End: 2022-01-12 | Stop reason: SDUPTHER

## 2021-12-15 RX ORDER — TACROLIMUS 1 MG/1
CAPSULE ORAL
Qty: 180 CAPSULE | Refills: 3 | Status: SHIPPED | OUTPATIENT
Start: 2021-12-15 | End: 2022-07-12 | Stop reason: SDUPTHER

## 2021-12-15 RX ORDER — TRAZODONE HYDROCHLORIDE 150 MG/1
TABLET ORAL
Qty: 90 TABLET | Refills: 3 | Status: SHIPPED | OUTPATIENT
Start: 2021-12-15 | End: 2022-07-12 | Stop reason: SDUPTHER

## 2021-12-15 RX ORDER — LEVOTHYROXINE SODIUM 88 UG/1
TABLET ORAL
Qty: 90 TABLET | Refills: 3 | Status: SHIPPED | OUTPATIENT
Start: 2021-12-15 | End: 2022-07-12 | Stop reason: SDUPTHER

## 2021-12-15 ASSESSMENT — ENCOUNTER SYMPTOMS
WHEEZING: 1
BACK PAIN: 1
CHEST TIGHTNESS: 1
SHORTNESS OF BREATH: 1

## 2021-12-15 NOTE — PROGRESS NOTES
Chief Complaint   Patient presents with    Diabetes    Hypertension       Have you seen any other physician or provider since your last visit no    Have you had any other diagnostic tests since your last visit? no    Have you changed or stopped any medications since your last visit? no

## 2021-12-15 NOTE — PROGRESS NOTES
SUBJECTIVE:    Patient ID: Enio Ellis is a 62 y.o. male. Chief Complaint   Patient presents with    Diabetes    Hypertension       HPI: office visit  He is in the office today in follow-up of his diabetes. He has not been taking his insulin as prescribed. He is struggling with trying to keep up with that all the time. He really hates the check his sugar and he hates to take the insulin. He is really continuing to be noncompliant. His blood sugar averages he thinks will probably be somewhat better than they were. Apparently he thinks he is done little better with his diet over the holidays are in full swing at this point. Is not had any chest pain. He denies any shortness of breath. He is slowly getting better with his atrial fib. Is not having as much palpitation or rapid heart rate. He is continue to see cardiology. His blood pressures do seem like they are doing well at home. He is still struggling with his weakness. He is still not really truly recovered back to his baseline from Covid. Review of Systems   Respiratory: Positive for chest tightness, shortness of breath and wheezing. Cardiovascular: Positive for leg swelling. Musculoskeletal: Positive for back pain and gait problem. All other systems reviewed and are negative. OBJECTIVE:  /68   Pulse 76   Temp 97.9 °F (36.6 °C)   Resp 18   Ht 5' 11\" (1.803 m)   Wt 212 lb (96.2 kg)   SpO2 98% Comment: ra  BMI 29.57 kg/m²    Wt Readings from Last 3 Encounters:   12/15/21 212 lb (96.2 kg)   11/02/21 208 lb 6.4 oz (94.5 kg)   10/05/21 205 lb (93 kg)     BP Readings from Last 3 Encounters:   12/15/21 122/68   11/02/21 132/80   10/05/21 102/60      Pulse Readings from Last 3 Encounters:   12/15/21 76   11/02/21 65   10/05/21 62     Body mass index is 29.57 kg/m².    Resp Readings from Last 3 Encounters:   12/15/21 18   10/05/21 18   09/16/21 18     Past medical, surgical, family and social history were reviewed and updated with the patient. Physical Exam  Vitals and nursing note reviewed. Constitutional:       Appearance: He is well-developed. HENT:      Head: Normocephalic and atraumatic. Right Ear: Hearing and external ear normal.      Left Ear: Hearing and external ear normal.      Nose: Nose normal.      Mouth/Throat:      Pharynx: Uvula midline. Eyes:      General: Lids are normal.      Conjunctiva/sclera: Conjunctivae normal.      Pupils: Pupils are equal, round, and reactive to light. Neck:      Thyroid: No thyroid mass or thyromegaly. Vascular: No carotid bruit. Trachea: Trachea and phonation normal.   Cardiovascular:      Rate and Rhythm: Normal rate and regular rhythm. Pulses: Normal pulses. Heart sounds: Normal heart sounds, S1 normal and S2 normal. No murmur heard. No friction rub. No gallop. Pulmonary:      Effort: Pulmonary effort is normal.      Breath sounds: Normal breath sounds. Musculoskeletal:      Cervical back: Normal range of motion and neck supple. Spasms and tenderness present. Lumbar back: Spasms and tenderness present. Decreased range of motion. Skin:     General: Skin is warm and dry. Neurological:      Mental Status: He is alert and oriented to person, place, and time. Mental status is at baseline. Psychiatric:         Attention and Perception: Attention and perception normal.         Mood and Affect: Mood and affect normal.         Speech: Speech normal.         Behavior: Behavior is cooperative. Thought Content: Thought content normal.         Cognition and Memory: Cognition and memory normal.         Judgment: Judgment normal.          No results found for requested labs within last 30 days.      Hemoglobin A1C (%)   Date Value   10/05/2021 9.1 (H)     Microalbumin, Random Urine (mg/dL)   Date Value   12/15/2021 62.40 (H)     LDL Calculated (mg/dL)   Date Value   08/14/2019 55       Lab Results   Component Value Date    WBC 8.8 10/05/2021 NEUTROABS 7.0 12/10/2020    HGB 11.3 10/05/2021    HCT 36.9 10/05/2021    MCV 87.2 10/05/2021     10/05/2021     Lab Results   Component Value Date    TSH 5.83 (H) 10/05/2021       ASSESSMENT:    Diagnosis Orders   1. Type 2 diabetes mellitus with complication (HCC)   DIABETES FOOT EXAM    Microalbumin / creatinine urine ratio   2. Anxiety  clonazePAM (KLONOPIN) 1 MG tablet   3. History of liver transplant (HCC)  tacrolimus (PROGRAF) 1 MG capsule   4. Arthritis  oxyCODONE-acetaminophen (PERCOCET)  MG per tablet   5. Atrial fibrillation, unspecified type ([facility] [address]     6. Essential hypertension     7. Congestive heart failure, unspecified HF chronicity, unspecified [facility] failure type ([facility]     8. Fatigue, unspecified type          PLAN:  Orders Placed This Encounter   Medications    levothyroxine (SYNTHROID) 88 MCG tablet     Sig: TAKE ONE TABLET BY MOUTH DAILY WITH WATER ON AN EMPTY STOMACH, WAIT 30 MINUTES BEFORE EATING OR TAKING OTHER MEDS     Dispense:  90 tablet     Refill:  3    clonazePAM (KLONOPIN) 1 MG tablet     Sig: Take 1 tablet by mouth 2 times daily as needed for Anxiety for up to 30 days. Dispense:  60 tablet     Refill:  2    tacrolimus (PROGRAF) 1 MG capsule     Sig: TAKE ONE CAPSULE BY MOUTH TWICE A DAY     Dispense:  180 capsule     Refill:  3    traZODone (DESYREL) 150 MG tablet     Sig: TAKE ONE TABLET BY MOUTH ONCE NIGHTLY     Dispense:  90 tablet     Refill:  3    pravastatin (PRAVACHOL) 40 MG tablet     Sig: TAKE ONE TABLET BY MOUTH DAILY     Dispense:  90 tablet     Refill:  3    oxyCODONE-acetaminophen (PERCOCET)  MG per tablet     Sig: Take 1 tablet by mouth every 6 hours as needed for Pain for up to 30 days.      Dispense:  90 tablet     Refill:  0     Reduce doses taken as pain becomes manageable        Medications Discontinued During This Encounter   Medication Reason    ondansetron (ZOFRAN) 4 MG tablet LIST CLEANUP    SITagliptin (JANUVIA) 100 MG tablet LIST CLEANUP    tacrolimus (PROGRAF) 1 MG capsule REORDER    traZODone (DESYREL) 150 MG tablet REORDER    pravastatin (PRAVACHOL) 40 MG tablet REORDER    levothyroxine (SYNTHROID) 88 MCG tablet REORDER    oxyCODONE-acetaminophen (PERCOCET)  MG per tablet REORDER    clonazePAM (KLONOPIN) 1 MG tablet REORDER       Controlled Substances Monitoring:      Please note: This chart was generated using Dragon dictation software. Although every effort was made to ensure the accuracy of this automated transcription, some errors in transcription may have occurred.

## 2021-12-16 LAB
CREATININE URINE: 53.4 MG/DL (ref 1.5–300)
MICROALBUMIN UR-MCNC: 62.4 MG/DL (ref 0–22)
MICROALBUMIN/CREAT UR-RTO: 1168.5 MG/G (ref 0–30)

## 2022-01-12 ENCOUNTER — HOSPITAL ENCOUNTER (OUTPATIENT)
Facility: HOSPITAL | Age: 62
Discharge: HOME | End: 2022-01-12
Payer: MEDICARE

## 2022-01-12 ENCOUNTER — OFFICE VISIT (OUTPATIENT)
Dept: FAMILY MEDICINE CLINIC | Age: 62
End: 2022-01-12
Payer: MEDICARE

## 2022-01-12 VITALS
DIASTOLIC BLOOD PRESSURE: 62 MMHG | OXYGEN SATURATION: 99 % | SYSTOLIC BLOOD PRESSURE: 116 MMHG | TEMPERATURE: 97.1 F | HEIGHT: 71 IN | HEART RATE: 80 BPM | BODY MASS INDEX: 29.82 KG/M2 | RESPIRATION RATE: 18 BRPM | WEIGHT: 213 LBS

## 2022-01-12 DIAGNOSIS — R53.83 FATIGUE, UNSPECIFIED TYPE: ICD-10-CM

## 2022-01-12 DIAGNOSIS — E11.8 TYPE 2 DIABETES MELLITUS WITH COMPLICATION (HCC): ICD-10-CM

## 2022-01-12 DIAGNOSIS — I10 ESSENTIAL HYPERTENSION: ICD-10-CM

## 2022-01-12 DIAGNOSIS — Z13.220 SCREENING, LIPID: ICD-10-CM

## 2022-01-12 DIAGNOSIS — E11.8 TYPE 2 DIABETES MELLITUS WITH COMPLICATION (HCC): Primary | ICD-10-CM

## 2022-01-12 DIAGNOSIS — M19.90 ARTHRITIS: ICD-10-CM

## 2022-01-12 PROCEDURE — 83036 HEMOGLOBIN GLYCOSYLATED A1C: CPT

## 2022-01-12 PROCEDURE — 84443 ASSAY THYROID STIM HORMONE: CPT

## 2022-01-12 PROCEDURE — 3052F HG A1C>EQUAL 8.0%<EQUAL 9.0%: CPT | Performed by: FAMILY MEDICINE

## 2022-01-12 PROCEDURE — 80061 LIPID PANEL: CPT

## 2022-01-12 PROCEDURE — 82977 ASSAY OF GGT: CPT

## 2022-01-12 PROCEDURE — 83735 ASSAY OF MAGNESIUM: CPT

## 2022-01-12 PROCEDURE — 85027 COMPLETE CBC AUTOMATED: CPT

## 2022-01-12 PROCEDURE — 80053 COMPREHEN METABOLIC PANEL: CPT

## 2022-01-12 PROCEDURE — 99214 OFFICE O/P EST MOD 30 MIN: CPT | Performed by: FAMILY MEDICINE

## 2022-01-12 RX ORDER — OXYCODONE AND ACETAMINOPHEN 10; 325 MG/1; MG/1
1 TABLET ORAL EVERY 6 HOURS PRN
Qty: 90 TABLET | Refills: 0 | Status: SHIPPED | OUTPATIENT
Start: 2022-01-12 | End: 2022-07-12 | Stop reason: SDUPTHER

## 2022-01-12 ASSESSMENT — ENCOUNTER SYMPTOMS: BACK PAIN: 1

## 2022-01-12 NOTE — PROGRESS NOTES
SUBJECTIVE:    Patient ID: Teri Sandhoff is a 62 y.o. male. Chief Complaint   Patient presents with    Diabetes       HPI: office visit  He is in the office today to follow-up on his diabetes. He is struggling with his diet still he has been taking his insulin about 4 days a week. He has been doing a little better with his shortness of breath and weakness from COVID. He is starting to feel more like himself again. He is getting a relatively decent appetite. He is getting some energy back. He still struggling with some back pain at times. He has not had any recent falls or injuries. He denies any chest pain  Review of Systems   Musculoskeletal: Positive for back pain and gait problem. All other systems reviewed and are negative. OBJECTIVE:  /62   Pulse 80   Temp 97.1 °F (36.2 °C)   Resp 18   Ht 5' 11\" (1.803 m)   Wt 213 lb (96.6 kg)   SpO2 99% Comment: ra  BMI 29.71 kg/m²    Wt Readings from Last 3 Encounters:   01/12/22 213 lb (96.6 kg)   12/15/21 212 lb (96.2 kg)   11/02/21 208 lb 6.4 oz (94.5 kg)     BP Readings from Last 3 Encounters:   01/12/22 116/62   12/15/21 122/68   11/02/21 132/80      Pulse Readings from Last 3 Encounters:   01/12/22 80   12/15/21 76   11/02/21 65     Body mass index is 29.71 kg/m². Resp Readings from Last 3 Encounters:   01/12/22 18   12/15/21 18   10/05/21 18     Past medical, surgical, family and social history were reviewed and updated with the patient. Physical Exam  Vitals and nursing note reviewed. Constitutional:       Appearance: He is well-developed. HENT:      Head: Normocephalic and atraumatic. Right Ear: Hearing and external ear normal.      Left Ear: Hearing and external ear normal.      Nose: Nose normal.      Mouth/Throat:      Pharynx: Uvula midline. Eyes:      General: Lids are normal.      Conjunctiva/sclera: Conjunctivae normal.      Pupils: Pupils are equal, round, and reactive to light.    Neck:      Thyroid: No thyroid mass or thyromegaly. Vascular: No carotid bruit. Trachea: Trachea and phonation normal.   Cardiovascular:      Rate and Rhythm: Normal rate and regular rhythm. Pulses: Normal pulses. Heart sounds: Normal heart sounds, S1 normal and S2 normal. No murmur heard. No friction rub. No gallop. Pulmonary:      Effort: Pulmonary effort is normal.      Breath sounds: Normal breath sounds. Musculoskeletal:      Cervical back: Normal range of motion and neck supple. Spasms and tenderness present. Lumbar back: Spasms and tenderness present. Decreased range of motion. Skin:     General: Skin is warm and dry. Neurological:      Mental Status: He is alert and oriented to person, place, and time. Mental status is at baseline. Psychiatric:         Attention and Perception: Attention and perception normal.         Mood and Affect: Mood and affect normal.         Speech: Speech normal.         Behavior: Behavior is cooperative. Thought Content:  Thought content normal.         Cognition and Memory: Cognition and memory normal.         Judgment: Judgment normal.          Results in Past 30 Days  Result Component Current Result Ref Range Previous Result Ref Range   Albumin/Globulin Ratio 1.3 (1/12/2022) 0.8 - 2.0 Not in Time Range    Albumin 4.1 (1/12/2022) 3.4 - 4.8 g/dL Not in Time Range    Alkaline Phosphatase 241 (H) (1/12/2022) 25 - 100 U/L Not in Time Range    ALT 50 (H) (1/12/2022) 4 - 36 U/L Not in Time Range    AST 34 (H) (1/12/2022) 8 - 33 U/L Not in Time Range    BUN 36 (H) (1/12/2022) 6 - 20 mg/dL Not in Time Range    Calcium 9.3 (1/12/2022) 8.5 - 10.5 mg/dL Not in Time Range    Chloride 102 (1/12/2022) 98 - 107 mmol/L Not in Time Range    CO2 24 (1/12/2022) 20 - 30 mmol/L Not in Time Range    CREATININE 1.8 (H) (1/12/2022) 0.4 - 1.2 mg/dL Not in Time Range    GFR  47 (L) (1/12/2022) >59 Not in Time Range    GFR Non- 39 (L) (1/12/2022) >59 Not in Time Range    Globulin 3.2 (1/12/2022) Not Established g/dL Not in Time Range    Glucose 264 (H) (1/12/2022) 74 - 106 mg/dL Not in Time Range    Potassium 5.3 (H) (1/12/2022) 3.4 - 5.1 mmol/L Not in Time Range    Sodium 138 (1/12/2022) 136 - 145 mmol/L Not in Time Range    Total Bilirubin 0.4 (1/12/2022) 0.3 - 1.2 mg/dL Not in Time Range    Total Protein 7.3 (1/12/2022) 6.4 - 8.3 g/dL Not in Time Range      Hemoglobin A1C (%)   Date Value   01/12/2022 8.7 (H)     Microalbumin, Random Urine (mg/dL)   Date Value   12/15/2021 62.40 (H)     LDL Calculated (mg/dL)   Date Value   01/12/2022 44       Lab Results   Component Value Date    WBC 9.5 01/12/2022    NEUTROABS 7.0 12/10/2020    HGB 16.0 01/12/2022    HCT 49.5 01/12/2022    MCV 86.8 01/12/2022     01/12/2022     Lab Results   Component Value Date    TSH 3.21 01/12/2022       ASSESSMENT:    Diagnosis Orders   1. Type 2 diabetes mellitus with complication (HCC)  COMPREHENSIVE METABOLIC PANEL    HEMOGLOBIN A1C   2. Essential hypertension     3. Fatigue, unspecified type  TSH without Reflex    CBC   4. Arthritis  oxyCODONE-acetaminophen (PERCOCET)  MG per tablet   5. Screening, lipid  LIPID PANEL        PLAN:  Orders Placed This Encounter   Medications    oxyCODONE-acetaminophen (PERCOCET)  MG per tablet     Sig: Take 1 tablet by mouth every 6 hours as needed for Pain for up to 30 days. Dispense:  90 tablet     Refill:  0     Reduce doses taken as pain becomes manageable        Medications Discontinued During This Encounter   Medication Reason    oxyCODONE-acetaminophen (PERCOCET)  MG per tablet REORDER       Controlled Substances Monitoring:      Please note: This chart was generated using Dragon dictation software. Although every effort was made to ensure the accuracy of this automated transcription, some errors in transcription may have occurred.

## 2022-01-13 LAB
A/G RATIO: 1.3 (ref 0.8–2)
ALBUMIN SERPL-MCNC: 4.1 G/DL (ref 3.4–4.8)
ALP BLD-CCNC: 241 U/L (ref 25–100)
ALT SERPL-CCNC: 50 U/L (ref 4–36)
ANION GAP SERPL CALCULATED.3IONS-SCNC: 12 MMOL/L (ref 3–16)
AST SERPL-CCNC: 34 U/L (ref 8–33)
BILIRUB SERPL-MCNC: 0.4 MG/DL (ref 0.3–1.2)
BUN BLDV-MCNC: 36 MG/DL (ref 6–20)
CALCIUM SERPL-MCNC: 9.3 MG/DL (ref 8.5–10.5)
CHLORIDE BLD-SCNC: 102 MMOL/L (ref 98–107)
CHOLESTEROL, TOTAL: 126 MG/DL (ref 0–200)
CO2: 24 MMOL/L (ref 20–30)
CREAT SERPL-MCNC: 1.8 MG/DL (ref 0.4–1.2)
GAMMA GLUTAMYL TRANSFERASE: 297 U/L (ref 8–61)
GFR AFRICAN AMERICAN: 47
GFR NON-AFRICAN AMERICAN: 39
GLOBULIN: 3.2 G/DL
GLUCOSE BLD-MCNC: 264 MG/DL (ref 74–106)
HBA1C MFR BLD: 8.7 %
HCT VFR BLD CALC: 49.5 % (ref 40–54)
HDLC SERPL-MCNC: 38 MG/DL (ref 40–60)
HEMOGLOBIN: 16 G/DL (ref 13–18)
LDL CHOLESTEROL CALCULATED: 44 MG/DL
MAGNESIUM: 1.9 MG/DL (ref 1.7–2.4)
MCH RBC QN AUTO: 28.1 PG (ref 27–32)
MCHC RBC AUTO-ENTMCNC: 32.3 G/DL (ref 31–35)
MCV RBC AUTO: 86.8 FL (ref 80–100)
PDW BLD-RTO: 15.4 % (ref 11–16)
PLATELET # BLD: 151 K/UL (ref 150–400)
PMV BLD AUTO: 10.9 FL (ref 6–10)
POTASSIUM SERPL-SCNC: 5.3 MMOL/L (ref 3.4–5.1)
RBC # BLD: 5.7 M/UL (ref 4.5–6)
SODIUM BLD-SCNC: 138 MMOL/L (ref 136–145)
TOTAL PROTEIN: 7.3 G/DL (ref 6.4–8.3)
TRIGL SERPL-MCNC: 220 MG/DL (ref 0–249)
TSH SERPL DL<=0.05 MIU/L-ACNC: 3.21 UIU/ML (ref 0.27–4.2)
VLDLC SERPL CALC-MCNC: 44 MG/DL
WBC # BLD: 9.5 K/UL (ref 4–11)

## 2022-03-14 DIAGNOSIS — I10 ESSENTIAL HYPERTENSION: ICD-10-CM

## 2022-04-12 ENCOUNTER — OFFICE VISIT (OUTPATIENT)
Dept: FAMILY MEDICINE CLINIC | Age: 62
End: 2022-04-12
Payer: MEDICARE

## 2022-04-12 ENCOUNTER — HOSPITAL ENCOUNTER (OUTPATIENT)
Facility: HOSPITAL | Age: 62
Discharge: HOME | End: 2022-04-12
Payer: MEDICARE

## 2022-04-12 VITALS
BODY MASS INDEX: 31.22 KG/M2 | OXYGEN SATURATION: 98 % | HEART RATE: 80 BPM | RESPIRATION RATE: 18 BRPM | SYSTOLIC BLOOD PRESSURE: 120 MMHG | DIASTOLIC BLOOD PRESSURE: 74 MMHG | HEIGHT: 71 IN | WEIGHT: 223 LBS

## 2022-04-12 DIAGNOSIS — E03.9 HYPOTHYROIDISM, UNSPECIFIED TYPE: ICD-10-CM

## 2022-04-12 DIAGNOSIS — E11.42 DIABETIC POLYNEUROPATHY ASSOCIATED WITH TYPE 2 DIABETES MELLITUS (HCC): ICD-10-CM

## 2022-04-12 DIAGNOSIS — E11.8 TYPE 2 DIABETES MELLITUS WITH COMPLICATION (HCC): Primary | ICD-10-CM

## 2022-04-12 DIAGNOSIS — I48.91 ATRIAL FIBRILLATION, UNSPECIFIED TYPE (HCC): ICD-10-CM

## 2022-04-12 DIAGNOSIS — E11.8 TYPE 2 DIABETES MELLITUS WITH COMPLICATION (HCC): ICD-10-CM

## 2022-04-12 DIAGNOSIS — F41.9 ANXIETY: ICD-10-CM

## 2022-04-12 DIAGNOSIS — N28.9 RENAL INSUFFICIENCY: ICD-10-CM

## 2022-04-12 DIAGNOSIS — R53.83 FATIGUE, UNSPECIFIED TYPE: ICD-10-CM

## 2022-04-12 DIAGNOSIS — Z94.4 HISTORY OF LIVER TRANSPLANT (HCC): ICD-10-CM

## 2022-04-12 DIAGNOSIS — I10 ESSENTIAL HYPERTENSION: ICD-10-CM

## 2022-04-12 DIAGNOSIS — M19.90 ARTHRITIS: ICD-10-CM

## 2022-04-12 PROCEDURE — 83036 HEMOGLOBIN GLYCOSYLATED A1C: CPT

## 2022-04-12 PROCEDURE — 82977 ASSAY OF GGT: CPT

## 2022-04-12 PROCEDURE — 84443 ASSAY THYROID STIM HORMONE: CPT

## 2022-04-12 PROCEDURE — 80053 COMPREHEN METABOLIC PANEL: CPT

## 2022-04-12 PROCEDURE — 85027 COMPLETE CBC AUTOMATED: CPT

## 2022-04-12 PROCEDURE — 83735 ASSAY OF MAGNESIUM: CPT

## 2022-04-12 PROCEDURE — 3046F HEMOGLOBIN A1C LEVEL >9.0%: CPT | Performed by: FAMILY MEDICINE

## 2022-04-12 PROCEDURE — 99214 OFFICE O/P EST MOD 30 MIN: CPT | Performed by: FAMILY MEDICINE

## 2022-04-12 RX ORDER — CLONAZEPAM 1 MG/1
1 TABLET ORAL 2 TIMES DAILY PRN
Qty: 60 TABLET | Refills: 2 | Status: SHIPPED | OUTPATIENT
Start: 2022-04-12 | End: 2022-07-12 | Stop reason: SDUPTHER

## 2022-04-12 RX ORDER — CHOLECALCIFEROL (VITAMIN D3) 100000/G
POWDER (GRAM) MISCELLANEOUS
COMMUNITY

## 2022-04-12 RX ORDER — PREGABALIN 50 MG/1
50 CAPSULE ORAL 3 TIMES DAILY
Qty: 90 CAPSULE | Refills: 2 | Status: SHIPPED | OUTPATIENT
Start: 2022-04-12 | End: 2022-07-12 | Stop reason: SDUPTHER

## 2022-04-12 ASSESSMENT — ENCOUNTER SYMPTOMS: BACK PAIN: 1

## 2022-04-12 NOTE — PROGRESS NOTES
Chief Complaint   Patient presents with    Diabetes     f/u        Have you seen any other physician or provider since your last visit no    Have you had any other diagnostic tests since your last visit? no    Have you changed or stopped any medications since your last visit? no     I have recommended that this patient have a colonoscopy but he declines at this time. I have discussed the risks and benefits of this examination with him. The patient verbalizes understanding. Diabetic retinal exam completed this year?  No

## 2022-04-12 NOTE — PROGRESS NOTES
SUBJECTIVE:    Patient ID: Mariano Mendoza is a 65 y.o. male. Chief Complaint   Patient presents with    Diabetes     f/u       HPI: office visit  Is in the office today to follow-up on his diabetes. He is still not taking his medications regularly. He admits that he does well with his diet for a few days and then he does not well with his diet. He is still having significantly high blood sugars at times. He still having some issues with neuropathy. He still having some increase in arthritic symptoms as he is getting older. He has not had any recent falls or injuries. He is doing pretty well with his atrophia. He has not had any significant palpitations chest pain or other symptoms. He does have follow-up with cardiology scheduled. Review of Systems   Musculoskeletal: Positive for back pain and gait problem. All other systems reviewed and are negative. OBJECTIVE:  /74   Pulse 80   Resp 18   Ht 5' 11\" (1.803 m)   Wt 223 lb (101.2 kg)   SpO2 98% Comment: RA  BMI 31.10 kg/m²    Wt Readings from Last 3 Encounters:   04/12/22 223 lb (101.2 kg)   01/12/22 213 lb (96.6 kg)   12/15/21 212 lb (96.2 kg)     BP Readings from Last 3 Encounters:   04/12/22 120/74   01/12/22 116/62   12/15/21 122/68      Pulse Readings from Last 3 Encounters:   04/12/22 80   01/12/22 80   12/15/21 76     Body mass index is 31.1 kg/m². Resp Readings from Last 3 Encounters:   04/12/22 18   01/12/22 18   12/15/21 18     Past medical, surgical, family and social history were reviewed and updated with the patient. Physical Exam  Vitals and nursing note reviewed. Constitutional:       Appearance: He is well-developed. HENT:      Head: Normocephalic and atraumatic. Right Ear: Hearing and external ear normal.      Left Ear: Hearing and external ear normal.      Nose: Nose normal.      Mouth/Throat:      Pharynx: Uvula midline.    Eyes:      General: Lids are normal.      Conjunctiva/sclera: Conjunctivae normal.      Pupils: Pupils are equal, round, and reactive to light. Neck:      Thyroid: No thyroid mass or thyromegaly. Vascular: No carotid bruit. Trachea: Trachea and phonation normal.   Cardiovascular:      Rate and Rhythm: Normal rate and regular rhythm. Pulses: Normal pulses. Heart sounds: Normal heart sounds, S1 normal and S2 normal. No murmur heard. No friction rub. No gallop. Pulmonary:      Effort: Pulmonary effort is normal.      Breath sounds: Normal breath sounds. Musculoskeletal:      Cervical back: Normal range of motion and neck supple. Spasms and tenderness present. Lumbar back: Spasms and tenderness present. Decreased range of motion. Skin:     General: Skin is warm and dry. Neurological:      Mental Status: He is alert and oriented to person, place, and time. Mental status is at baseline. Psychiatric:         Attention and Perception: Attention and perception normal.         Mood and Affect: Mood and affect normal.         Speech: Speech normal.         Behavior: Behavior is cooperative. Thought Content:  Thought content normal.         Cognition and Memory: Cognition and memory normal.         Judgment: Judgment normal.          Results in Past 30 Days  Result Component Current Result Ref Range Previous Result Ref Range   Albumin/Globulin Ratio 1.1 (4/12/2022) 0.8 - 2.0 Not in Time Range    Albumin 3.8 (4/12/2022) 3.4 - 4.8 g/dL Not in Time Range    Alkaline Phosphatase 214 (H) (4/12/2022) 25 - 100 U/L Not in Time Range    ALT 38 (H) (4/12/2022) 4 - 36 U/L Not in Time Range    AST 29 (4/12/2022) 8 - 33 U/L Not in Time Range    BUN 32 (H) (4/12/2022) 6 - 20 mg/dL Not in Time Range    Calcium 9.4 (4/12/2022) 8.5 - 10.5 mg/dL Not in Time Range    Chloride 97 (L) (4/12/2022) 98 - 107 mmol/L Not in Time Range    CO2 24 (4/12/2022) 20 - 30 mmol/L Not in Time Range    CREATININE 1.8 (H) (4/12/2022) 0.4 - 1.2 mg/dL Not in Time Range    GFR  47 (L) (4/12/2022) >59 Not in Time Range    GFR Non- 39 (L) (4/12/2022) >59 Not in Time Range    Globulin 3.4 (4/12/2022) Not Established g/dL Not in Time Range    Glucose 269 (H) (4/12/2022) 74 - 106 mg/dL Not in Time Range    Potassium 4.8 (4/12/2022) 3.4 - 5.1 mmol/L Not in Time Range    Sodium 135 (L) (4/12/2022) 136 - 145 mmol/L Not in Time Range    Total Bilirubin 0.5 (4/12/2022) 0.3 - 1.2 mg/dL Not in Time Range    Total Protein 7.2 (4/12/2022) 6.4 - 8.3 g/dL Not in Time Range      Hemoglobin A1C (%)   Date Value   04/12/2022 9.7 (H)     Microalbumin, Random Urine (mg/dL)   Date Value   12/15/2021 62.40 (H)     LDL Calculated (mg/dL)   Date Value   01/12/2022 44       Lab Results   Component Value Date    WBC 11.2 04/12/2022    NEUTROABS 7.0 12/10/2020    HGB 15.4 04/12/2022    HCT 45.9 04/12/2022    MCV 86.6 04/12/2022     04/12/2022     Lab Results   Component Value Date    TSH 2.75 04/12/2022       ASSESSMENT/PLAN    Diagnosis Orders   1. Type 2 diabetes mellitus with complication (HCC) we will recheck his levels. I do think he would do much better if he would just check his blood sugar and take his medicine on a regular basis. I do not think he is compliant enough to really could be considered for an insulin pump but it is a thought. He is definitely not eating as well as he should be. In not compliant with taking his medicines on a all the time basis Insulin Glargine, 2 Unit Dial, 300 UNIT/ML SOPN    Comprehensive Metabolic Panel    Hemoglobin A1C   2. Essential hypertension blood pressure seem to be doing relatively well. 3. Fatigue, unspecified type we will check a thyroid level but I think is probably due to his blood sugars being too high. TSH   4. Arthritis getting worse as he is getting a little older. 5. Atrial fibrillation, unspecified type [facility]) he does have cardiology follow-up scheduled seems to be doing well at this time    6.  History of liver transplant [facility]) he

## 2022-04-13 LAB
A/G RATIO: 1.1 (ref 0.8–2)
ALBUMIN SERPL-MCNC: 3.8 G/DL (ref 3.4–4.8)
ALP BLD-CCNC: 214 U/L (ref 25–100)
ALT SERPL-CCNC: 38 U/L (ref 4–36)
ANION GAP SERPL CALCULATED.3IONS-SCNC: 14 MMOL/L (ref 3–16)
AST SERPL-CCNC: 29 U/L (ref 8–33)
BILIRUB SERPL-MCNC: 0.5 MG/DL (ref 0.3–1.2)
BUN BLDV-MCNC: 32 MG/DL (ref 6–20)
CALCIUM SERPL-MCNC: 9.4 MG/DL (ref 8.5–10.5)
CHLORIDE BLD-SCNC: 97 MMOL/L (ref 98–107)
CO2: 24 MMOL/L (ref 20–30)
CREAT SERPL-MCNC: 1.8 MG/DL (ref 0.4–1.2)
GAMMA GLUTAMYL TRANSFERASE: 325 U/L (ref 8–61)
GFR AFRICAN AMERICAN: 47
GFR NON-AFRICAN AMERICAN: 39
GLOBULIN: 3.4 G/DL
GLUCOSE BLD-MCNC: 269 MG/DL (ref 74–106)
HBA1C MFR BLD: 9.7 %
HCT VFR BLD CALC: 45.9 % (ref 40–54)
HEMOGLOBIN: 15.4 G/DL (ref 13–18)
MAGNESIUM: 2 MG/DL (ref 1.7–2.4)
MCH RBC QN AUTO: 29.1 PG (ref 27–32)
MCHC RBC AUTO-ENTMCNC: 33.6 G/DL (ref 31–35)
MCV RBC AUTO: 86.6 FL (ref 80–100)
PDW BLD-RTO: 15.4 % (ref 11–16)
PLATELET # BLD: 153 K/UL (ref 150–400)
PMV BLD AUTO: 10.8 FL (ref 6–10)
POTASSIUM SERPL-SCNC: 4.8 MMOL/L (ref 3.4–5.1)
RBC # BLD: 5.3 M/UL (ref 4.5–6)
SODIUM BLD-SCNC: 135 MMOL/L (ref 136–145)
TOTAL PROTEIN: 7.2 G/DL (ref 6.4–8.3)
TSH SERPL DL<=0.05 MIU/L-ACNC: 2.75 UIU/ML (ref 0.27–4.2)
WBC # BLD: 11.2 K/UL (ref 4–11)

## 2022-06-02 ENCOUNTER — OFFICE VISIT (OUTPATIENT)
Dept: FAMILY MEDICINE CLINIC | Age: 62
End: 2022-06-02
Payer: MEDICARE

## 2022-06-02 VITALS
HEART RATE: 84 BPM | DIASTOLIC BLOOD PRESSURE: 60 MMHG | BODY MASS INDEX: 31.64 KG/M2 | RESPIRATION RATE: 16 BRPM | OXYGEN SATURATION: 97 % | TEMPERATURE: 98.4 F | SYSTOLIC BLOOD PRESSURE: 130 MMHG | WEIGHT: 226 LBS | HEIGHT: 71 IN

## 2022-06-02 DIAGNOSIS — Z00.00 MEDICARE ANNUAL WELLNESS VISIT, SUBSEQUENT: Primary | ICD-10-CM

## 2022-06-02 PROCEDURE — G0439 PPPS, SUBSEQ VISIT: HCPCS | Performed by: FAMILY MEDICINE

## 2022-06-02 RX ORDER — METOPROLOL TARTRATE 50 MG/1
25 TABLET, FILM COATED ORAL 2 TIMES DAILY
COMMUNITY
End: 2022-07-12 | Stop reason: SDUPTHER

## 2022-06-02 ASSESSMENT — PATIENT HEALTH QUESTIONNAIRE - PHQ9
SUM OF ALL RESPONSES TO PHQ QUESTIONS 1-9: 0
1. LITTLE INTEREST OR PLEASURE IN DOING THINGS: 0
SUM OF ALL RESPONSES TO PHQ9 QUESTIONS 1 & 2: 0
2. FEELING DOWN, DEPRESSED OR HOPELESS: 0
SUM OF ALL RESPONSES TO PHQ QUESTIONS 1-9: 0

## 2022-06-02 ASSESSMENT — LIFESTYLE VARIABLES: HOW OFTEN DO YOU HAVE A DRINK CONTAINING ALCOHOL: NEVER

## 2022-06-02 NOTE — PATIENT INSTRUCTIONS
Personalized Preventive Plan for Marco A Bailey - 6/2/2022  Medicare offers a range of preventive health benefits. Some of the tests and screenings are paid in full while other may be subject to a deductible, co-insurance, and/or copay. Some of these benefits include a comprehensive review of your medical history including lifestyle, illnesses that may run in your family, and various assessments and screenings as appropriate. After reviewing your medical record and screening and assessments performed today your provider may have ordered immunizations, labs, imaging, and/or referrals for you. A list of these orders (if applicable) as well as your Preventive Care list are included within your After Visit Summary for your review. Other Preventive Recommendations:    · A preventive eye exam performed by an eye specialist is recommended every 1-2 years to screen for glaucoma; cataracts, macular degeneration, and other eye disorders. · A preventive dental visit is recommended every 6 months. · Try to get at least 150 minutes of exercise per week or 10,000 steps per day on a pedometer . · Order or download the FREE \"Exercise & Physical Activity: Your Everyday Guide\" from [employer]. Call 8-425.479.5651 or search [employer] online. · You need 8817-1133 mg of calcium and 7452-4226 IU of vitamin D per day. It is possible to meet your calcium requirement with diet alone, but a vitamin D supplement is usually necessary to meet this goal.  · When exposed to the sun, use a sunscreen that protects against both UVA and UVB radiation with an SPF of 30 or greater. Reapply every 2 to 3 hours or after sweating, drying off with a towel, or swimming. · Always wear a seat belt when traveling in a car. Always wear a helmet when riding a bicycle or motorcycle.

## 2022-06-02 NOTE — PROGRESS NOTES
Patient has Cologuard kit at home already - he is refusing to do it at this time stating \"I don't do colon or my diabetes and I'm not doing it\"

## 2022-06-02 NOTE — PROGRESS NOTES
Medicare Annual Wellness Visit    Oanh Flores is here for Medicare AWV    Assessment & Plan   Medicare annual wellness visit, subsequent      Recommendations for Preventive Services Due: see orders and patient instructions/AVS.  Recommended screening schedule for the next 5-10 years is provided to the patient in written form: see Patient Instructions/AVS.     Return for Medicare Annual Wellness Visit in 1 year. Subjective     Patient's complete Health Risk Assessment and screening values have been reviewed and are found in Flowsheets. The following problems were reviewed today and where indicated follow up appointments were made and/or referrals ordered.     Positive Risk Factor Screenings with Interventions:    Fall Risk:  Do you feel unsteady or are you worried about falling? : no  2 or more falls in past year?: (!) yes  Fall with injury in past year?: no     Fall Risk Interventions:    · Discussed fall risk at home              General Health and ACP:  General  In general, how would you say your health is?: Fair  In the past 7 days, have you experienced any of the following: New or Increased Pain, New or Increased Fatigue, Loneliness, Social Isolation, Stress or Anger?: (!) Yes  Select all that apply: (!) New or Increased Pain  Do you get the social and emotional support that you need?: Yes  Do you have a Living Will?: (!) No    Advance Directives     Power of  Living Will ACP-Advance Directive ACP-Power of     Not on File Not on File Not on File Not on File      General Health Risk Interventions:  · Discussed the need for a living will     Health Habits/Nutrition:     Physical Activity: Inactive    Days of Exercise per Week: 0 days    Minutes of Exercise per Session: 0 min     Have you lost any weight without trying in the past 3 months?: No  Body mass index: (!) 31.52  Have you seen the dentist within the past year?: (!) No    Health Habits/Nutrition Interventions:  · Discussed healthy living and lifestyle    Hearing/Vision:  Do you or your family notice any trouble with your hearing that hasn't been managed with hearing aids?: No  Do you have difficulty driving, watching TV, or doing any of your daily activities because of your eyesight?: No  Have you had an eye exam within the past year?: (!) No  No exam data present    Hearing/Vision Interventions:  · Discussed the need for regular eye exams. Objective   Vitals:    06/02/22 1551   BP: 130/60   Pulse: 84   Resp: 16   Temp: 98.4 °F (36.9 °C)   TempSrc: Infrared   SpO2: 97%   Weight: 226 lb (102.5 kg)   Height: 5' 11\" (1.803 m)      Body mass index is 31.52 kg/m². No Known Allergies  Prior to Visit Medications    Medication Sig Taking?  Authorizing Provider   metoprolol tartrate (LOPRESSOR) 50 MG tablet Take 25 mg by mouth 2 times daily Yes Historical Provider, MD   Cholecalciferol (VITAMIN D3) 565620 UNIT/GM POWD 1 tab(s) orally once a day (in the evening) Yes Historical Provider, MD   Insulin Glargine, 2 Unit Dial, 300 UNIT/ML SOPN Inject 50 Units into the skin daily Yes Easton Cagle MD   levothyroxine (SYNTHROID) 88 MCG tablet TAKE ONE TABLET BY MOUTH DAILY WITH WATER ON AN EMPTY STOMACH, WAIT 30 MINUTES BEFORE EATING OR TAKING OTHER MEDS Yes Easton Cagle MD   tacrolimus (PROGRAF) 1 MG capsule TAKE ONE CAPSULE BY MOUTH TWICE A DAY Yes Easton Cagle MD   traZODone (DESYREL) 150 MG tablet TAKE ONE TABLET BY MOUTH ONCE NIGHTLY Yes Easton Cagle MD   pravastatin (PRAVACHOL) 40 MG tablet TAKE ONE TABLET BY MOUTH DAILY Yes Easton Cagle MD   pantoprazole (PROTONIX) 40 MG tablet Take 1 tablet by mouth daily (with breakfast) Yes Easton Cagle MD   dofetilide (TIKOSYN) 250 MCG capsule Take 250 mcg by mouth 2 times daily Yes Historical Provider, MD   albuterol (PROVENTIL) (2.5 MG/3ML) 0.083% nebulizer solution Take 3 mLs by nebulization every 4 hours as needed for Wheezing Yes Easton Cagle MD   ELIQUIS 2.5 MG TABS tablet Take 1 tablet by mouth 2 times daily Yes Historical Provider, MD   Fluticasone furoate-vilanterol (BREO ELLIPTA) 200-25 MCG/INH AEPB inhaler Inhale 1 puff into the lungs daily Yes DELMY Rainey - GIOVANNY   PROAIR RESPICLICK 913 (90 Base) MCG/ACT aerosol powder inhalation Inhale 200 puffs into the lungs 2 times daily Yes Makenzie Chandra MD   clonazePAM (KLONOPIN) 1 MG tablet Take 1 tablet by mouth 2 times daily as needed for Anxiety for up to 30 days. Makenzie Chandra MD   pregabalin (LYRICA) 50 MG capsule Take 1 capsule by mouth 3 times daily for 30 days.  Stop the oxycodone  Makenzie Chandra MD   nitroGLYCERIN (NITROSTAT) 0.4 MG SL tablet Place 1 tablet under the tongue every 5 minutes as needed for Chest pain  Patient not taking: Reported on 6/2/2022  Makenzie Chandra MD       [facility] (Including outside providers/suppliers regularly involved in providing care):   Patient Care Team:  Makenzie Chandra MD as PCP - Mahsa Parnell MD as PCP - [facility] Empaneled Provider     Reviewed and updated this visit:  Tobacco  Allergies  Meds  Problems  Med Hx  Surg Hx  Soc Hx  Fam Hx

## 2022-06-15 ENCOUNTER — TELEPHONE (OUTPATIENT)
Dept: FAMILY MEDICINE CLINIC | Age: 62
End: 2022-06-15

## 2022-06-22 DIAGNOSIS — U07.1 COVID: Primary | ICD-10-CM

## 2022-06-22 RX ORDER — AZITHROMYCIN 250 MG/1
250 TABLET, FILM COATED ORAL SEE ADMIN INSTRUCTIONS
Qty: 6 TABLET | Refills: 0 | Status: SHIPPED | OUTPATIENT
Start: 2022-06-22 | End: 2022-06-27

## 2022-06-22 NOTE — PROGRESS NOTES
SUBJECTIVE:    Patient ID: Petra Mathis is a 65 y.o. male. No chief complaint on file. HPI:    Patient's medications,allergies, past medical, surgical, social and family histories were reviewed and updated as appropriate. .  Current Outpatient Medications on File Prior to Visit   Medication Sig Dispense Refill    metoprolol tartrate (LOPRESSOR) 50 MG tablet Take 25 mg by mouth 2 times daily      Cholecalciferol (VITAMIN D3) 897449 UNIT/GM POWD 1 tab(s) orally once a day (in the evening)      Insulin Glargine, 2 Unit Dial, 300 UNIT/ML SOPN Inject 50 Units into the skin daily 3 mL 5    clonazePAM (KLONOPIN) 1 MG tablet Take 1 tablet by mouth 2 times daily as needed for Anxiety for up to 30 days. 60 tablet 2    pregabalin (LYRICA) 50 MG capsule Take 1 capsule by mouth 3 times daily for 30 days.  Stop the oxycodone 90 capsule 2    levothyroxine (SYNTHROID) 88 MCG tablet TAKE ONE TABLET BY MOUTH DAILY WITH WATER ON AN EMPTY STOMACH, WAIT 30 MINUTES BEFORE EATING OR TAKING OTHER MEDS 90 tablet 3    tacrolimus (PROGRAF) 1 MG capsule TAKE ONE CAPSULE BY MOUTH TWICE A  capsule 3    traZODone (DESYREL) 150 MG tablet TAKE ONE TABLET BY MOUTH ONCE NIGHTLY 90 tablet 3    pravastatin (PRAVACHOL) 40 MG tablet TAKE ONE TABLET BY MOUTH DAILY 90 tablet 3    pantoprazole (PROTONIX) 40 MG tablet Take 1 tablet by mouth daily (with breakfast) 30 tablet 5    dofetilide (TIKOSYN) 250 MCG capsule Take 250 mcg by mouth 2 times daily      albuterol (PROVENTIL) (2.5 MG/3ML) 0.083% nebulizer solution Take 3 mLs by nebulization every 4 hours as needed for Wheezing 25 each 2    ELIQUIS 2.5 MG TABS tablet Take 1 tablet by mouth 2 times daily      Fluticasone furoate-vilanterol (BREO ELLIPTA) 200-25 MCG/INH AEPB inhaler Inhale 1 puff into the lungs daily 1 each 0    PROAIR RESPICLICK 590 (90 Base) MCG/ACT aerosol powder inhalation Inhale 200 puffs into the lungs 2 times daily 1 Inhaler 5    nitroGLYCERIN (NITROSTAT) 0.4 MG SL tablet Place 1 tablet under the tongue every 5 minutes as needed for Chest pain (Patient not taking: Reported on 6/2/2022) 25 tablet 0     Current Facility-Administered Medications on File Prior to Visit   Medication Dose Route Frequency Provider Last Rate Last Admin    0.9 % sodium chloride infusion   IntraVENous Once Sammi Gomez MD   Stopped at 09/16/21 1426    0.9 % sodium chloride infusion   IntraVENous Once Sammi Gomez MD   Stopped at 09/14/21 1229       Review of Systems    OBJECTIVE:  There were no vitals taken for this visit. Physical Exam    No results found for requested labs within last 30 days. Hemoglobin A1C (%)   Date Value   04/12/2022 9.7 (H)     Microalbumin, Random Urine (mg/dL)   Date Value   12/15/2021 62.40 (H)     LDL Calculated (mg/dL)   Date Value   01/12/2022 44           Lab Results   Component Value Date    TSH 2.75 04/12/2022         ASSESSMENT/PLAN:     There are no diagnoses linked to this encounter. There are no discontinued medications.

## 2022-07-12 ENCOUNTER — OFFICE VISIT (OUTPATIENT)
Dept: FAMILY MEDICINE CLINIC | Age: 62
End: 2022-07-12
Payer: MEDICARE

## 2022-07-12 ENCOUNTER — HOSPITAL ENCOUNTER (OUTPATIENT)
Facility: HOSPITAL | Age: 62
Discharge: HOME | End: 2022-07-12
Payer: MEDICARE

## 2022-07-12 VITALS
TEMPERATURE: 98.1 F | HEART RATE: 82 BPM | OXYGEN SATURATION: 97 % | HEIGHT: 71 IN | DIASTOLIC BLOOD PRESSURE: 66 MMHG | WEIGHT: 224 LBS | BODY MASS INDEX: 31.36 KG/M2 | SYSTOLIC BLOOD PRESSURE: 132 MMHG | RESPIRATION RATE: 18 BRPM

## 2022-07-12 DIAGNOSIS — I10 HYPERTENSION, UNSPECIFIED TYPE: ICD-10-CM

## 2022-07-12 DIAGNOSIS — I50.9 CONGESTIVE HEART FAILURE, UNSPECIFIED HF CHRONICITY, UNSPECIFIED HEART FAILURE TYPE (HCC): ICD-10-CM

## 2022-07-12 DIAGNOSIS — E11.8 TYPE 2 DIABETES MELLITUS WITH COMPLICATION (HCC): Primary | ICD-10-CM

## 2022-07-12 DIAGNOSIS — Z94.4 HISTORY OF LIVER TRANSPLANT (HCC): ICD-10-CM

## 2022-07-12 DIAGNOSIS — R06.02 SOB (SHORTNESS OF BREATH): ICD-10-CM

## 2022-07-12 DIAGNOSIS — E11.8 TYPE 2 DIABETES MELLITUS WITH COMPLICATION (HCC): ICD-10-CM

## 2022-07-12 DIAGNOSIS — F41.9 ANXIETY: ICD-10-CM

## 2022-07-12 DIAGNOSIS — M19.90 ARTHRITIS: ICD-10-CM

## 2022-07-12 DIAGNOSIS — E11.42 DIABETIC POLYNEUROPATHY ASSOCIATED WITH TYPE 2 DIABETES MELLITUS (HCC): ICD-10-CM

## 2022-07-12 PROCEDURE — 83036 HEMOGLOBIN GLYCOSYLATED A1C: CPT

## 2022-07-12 PROCEDURE — 80053 COMPREHEN METABOLIC PANEL: CPT

## 2022-07-12 PROCEDURE — 83735 ASSAY OF MAGNESIUM: CPT

## 2022-07-12 PROCEDURE — 82977 ASSAY OF GGT: CPT

## 2022-07-12 PROCEDURE — 85027 COMPLETE CBC AUTOMATED: CPT

## 2022-07-12 PROCEDURE — 36415 COLL VENOUS BLD VENIPUNCTURE: CPT

## 2022-07-12 PROCEDURE — 99214 OFFICE O/P EST MOD 30 MIN: CPT | Performed by: FAMILY MEDICINE

## 2022-07-12 PROCEDURE — 3046F HEMOGLOBIN A1C LEVEL >9.0%: CPT | Performed by: FAMILY MEDICINE

## 2022-07-12 RX ORDER — METOPROLOL TARTRATE 50 MG/1
25 TABLET, FILM COATED ORAL 2 TIMES DAILY
Qty: 90 TABLET | Refills: 3 | Status: SHIPPED | OUTPATIENT
Start: 2022-07-12

## 2022-07-12 RX ORDER — PREGABALIN 50 MG/1
50 CAPSULE ORAL 3 TIMES DAILY
Qty: 270 CAPSULE | Refills: 1 | Status: SHIPPED | OUTPATIENT
Start: 2022-07-12 | End: 2022-10-11 | Stop reason: SDUPTHER

## 2022-07-12 RX ORDER — LEVOTHYROXINE SODIUM 88 UG/1
TABLET ORAL
Qty: 90 TABLET | Refills: 3 | Status: SHIPPED | OUTPATIENT
Start: 2022-07-12

## 2022-07-12 RX ORDER — FLUTICASONE FUROATE AND VILANTEROL 200; 25 UG/1; UG/1
1 POWDER RESPIRATORY (INHALATION) DAILY
Qty: 1 EACH | Refills: 0 | Status: SHIPPED | OUTPATIENT
Start: 2022-07-12

## 2022-07-12 RX ORDER — ALBUTEROL SULFATE 90 UG/1
200 POWDER, METERED RESPIRATORY (INHALATION) 2 TIMES DAILY
Qty: 1 EACH | Refills: 5 | Status: SHIPPED | OUTPATIENT
Start: 2022-07-12 | End: 2022-07-14 | Stop reason: SDUPTHER

## 2022-07-12 RX ORDER — OXYCODONE AND ACETAMINOPHEN 10; 325 MG/1; MG/1
1 TABLET ORAL EVERY 6 HOURS PRN
Qty: 90 TABLET | Refills: 0 | Status: SHIPPED | OUTPATIENT
Start: 2022-07-12 | End: 2022-08-11

## 2022-07-12 RX ORDER — APIXABAN 2.5 MG/1
2.5 TABLET, FILM COATED ORAL 2 TIMES DAILY
Qty: 180 TABLET | Refills: 3 | Status: SHIPPED | OUTPATIENT
Start: 2022-07-12

## 2022-07-12 RX ORDER — NITROGLYCERIN 0.4 MG/1
0.4 TABLET SUBLINGUAL EVERY 5 MIN PRN
Qty: 25 TABLET | Refills: 0 | Status: SHIPPED | OUTPATIENT
Start: 2022-07-12

## 2022-07-12 RX ORDER — PANTOPRAZOLE SODIUM 40 MG/1
40 TABLET, DELAYED RELEASE ORAL
Qty: 90 TABLET | Refills: 3 | Status: SHIPPED | OUTPATIENT
Start: 2022-07-12

## 2022-07-12 RX ORDER — TRAZODONE HYDROCHLORIDE 150 MG/1
TABLET ORAL
Qty: 90 TABLET | Refills: 3 | Status: SHIPPED | OUTPATIENT
Start: 2022-07-12

## 2022-07-12 RX ORDER — CLONAZEPAM 1 MG/1
1 TABLET ORAL 2 TIMES DAILY PRN
Qty: 60 TABLET | Refills: 2 | Status: SHIPPED | OUTPATIENT
Start: 2022-07-12 | End: 2022-08-11

## 2022-07-12 RX ORDER — TACROLIMUS 1 MG/1
CAPSULE ORAL
Qty: 180 CAPSULE | Refills: 3 | Status: SHIPPED | OUTPATIENT
Start: 2022-07-12

## 2022-07-12 RX ORDER — PRAVASTATIN SODIUM 40 MG
TABLET ORAL
Qty: 90 TABLET | Refills: 3 | Status: SHIPPED | OUTPATIENT
Start: 2022-07-12

## 2022-07-12 RX ORDER — ALBUTEROL SULFATE 2.5 MG/3ML
2.5 SOLUTION RESPIRATORY (INHALATION) EVERY 4 HOURS PRN
Qty: 25 EACH | Refills: 2 | Status: SHIPPED | OUTPATIENT
Start: 2022-07-12

## 2022-07-12 SDOH — ECONOMIC STABILITY: FOOD INSECURITY: WITHIN THE PAST 12 MONTHS, YOU WORRIED THAT YOUR FOOD WOULD RUN OUT BEFORE YOU GOT MONEY TO BUY MORE.: NEVER TRUE

## 2022-07-12 SDOH — ECONOMIC STABILITY: FOOD INSECURITY: WITHIN THE PAST 12 MONTHS, THE FOOD YOU BOUGHT JUST DIDN'T LAST AND YOU DIDN'T HAVE MONEY TO GET MORE.: NEVER TRUE

## 2022-07-12 ASSESSMENT — SOCIAL DETERMINANTS OF HEALTH (SDOH): HOW HARD IS IT FOR YOU TO PAY FOR THE VERY BASICS LIKE FOOD, HOUSING, MEDICAL CARE, AND HEATING?: NOT VERY HARD

## 2022-07-12 ASSESSMENT — ENCOUNTER SYMPTOMS: BACK PAIN: 1

## 2022-07-12 NOTE — PROGRESS NOTES
SUBJECTIVE:    Patient ID: Myriam Souza is a 65 y.o. male. Chief Complaint   Patient presents with    Diabetes       HPI: office visit  In the office today in follow-up of his diabetes. He is doing a little better with his medicine though he admits he is not watching his diet. He is struggling with keeping his sugar levels good. He continues to struggle with his apathetic about his medicines and his sugars. He is still having some fatigue. He still having some weakness at times. He is following up with cardiology about his atrophia. Review of Systems   Musculoskeletal:  Positive for back pain and gait problem. All other systems reviewed and are negative. OBJECTIVE:  /66   Pulse 82   Temp 98.1 °F (36.7 °C)   Resp 18   Ht 5' 11\" (1.803 m)   Wt 224 lb (101.6 kg)   SpO2 97% Comment: ra  BMI 31.24 kg/m²    Wt Readings from Last 3 Encounters:   07/12/22 224 lb (101.6 kg)   06/02/22 226 lb (102.5 kg)   04/12/22 223 lb (101.2 kg)     BP Readings from Last 3 Encounters:   07/12/22 132/66   06/02/22 130/60   04/12/22 120/74      Pulse Readings from Last 3 Encounters:   07/12/22 82   06/02/22 84   04/12/22 80     Body mass index is 31.24 kg/m². Resp Readings from Last 3 Encounters:   07/12/22 18   06/02/22 16   04/12/22 18     Past medical, surgical, family and social history were reviewed and updated with the patient. Physical Exam  Vitals and nursing note reviewed. Constitutional:       Appearance: He is well-developed. HENT:      Head: Normocephalic and atraumatic. Right Ear: Hearing and external ear normal.      Left Ear: Hearing and external ear normal.      Nose: Nose normal.      Mouth/Throat:      Pharynx: Uvula midline. Eyes:      General: Lids are normal.      Conjunctiva/sclera: Conjunctivae normal.      Pupils: Pupils are equal, round, and reactive to light. Neck:      Thyroid: No thyroid mass or thyromegaly. Vascular: No carotid bruit.       Trachea: Trachea and phonation normal.   Cardiovascular:      Rate and Rhythm: Normal rate and regular rhythm. Pulses: Normal pulses. Heart sounds: Normal heart sounds, S1 normal and S2 normal. No murmur heard. No friction rub. No gallop. Pulmonary:      Effort: Pulmonary effort is normal.      Breath sounds: Normal breath sounds. Musculoskeletal:      Cervical back: Normal range of motion and neck supple. Spasms and tenderness present. Lumbar back: Spasms and tenderness present. Decreased range of motion. Skin:     General: Skin is warm and dry. Neurological:      Mental Status: He is alert and oriented to person, place, and time. Mental status is at baseline. Psychiatric:         Attention and Perception: Attention and perception normal.         Mood and Affect: Mood and affect normal.         Speech: Speech normal.         Behavior: Behavior is cooperative. Thought Content:  Thought content normal.         Cognition and Memory: Cognition and memory normal.         Judgment: Judgment normal.        Results in Past 30 Days  Result Component Current Result Ref Range Previous Result Ref Range   Albumin/Globulin Ratio 1.3 (7/12/2022) 0.8 - 2.0 Not in Time Range    Albumin 4.0 (7/12/2022) 3.4 - 4.8 g/dL Not in Time Range    Alkaline Phosphatase 258 (H) (7/12/2022) 25 - 100 U/L Not in Time Range    ALT 36 (7/12/2022) 4 - 36 U/L Not in Time Range    AST 35 (H) (7/12/2022) 8 - 33 U/L Not in Time Range    BUN 31 (H) (7/12/2022) 6 - 20 mg/dL Not in Time Range    Calcium 9.3 (7/12/2022) 8.5 - 10.5 mg/dL Not in Time Range    Chloride 95 (L) (7/12/2022) 98 - 107 mmol/L Not in Time Range    CO2 26 (7/12/2022) 20 - 30 mmol/L Not in Time Range    Creatinine 1.8 (H) (7/12/2022) 0.4 - 1.2 mg/dL Not in Time Range    GFR  47 (L) (7/12/2022) >59 Not in Time Range    GFR Non- 39 (L) (7/12/2022) >59 Not in Time Range    Globulin 3.2 (7/12/2022) Not Established g/dL Not in Time Range Glucose 219 (H) (7/12/2022) 74 - 106 mg/dL Not in Time Range    Potassium 4.7 (7/12/2022) 3.4 - 5.1 mmol/L Not in Time Range    Sodium 132 (L) (7/12/2022) 136 - 145 mmol/L Not in Time Range    Total Bilirubin 0.7 (7/12/2022) 0.3 - 1.2 mg/dL Not in Time Range    Total Protein 7.2 (7/12/2022) 6.4 - 8.3 g/dL Not in Time Range      Hemoglobin A1C (%)   Date Value   07/12/2022 10.0 (H)     Microalbumin, Random Urine (mg/dL)   Date Value   12/15/2021 62.40 (H)     LDL Calculated (mg/dL)   Date Value   01/12/2022 44       Lab Results   Component Value Date/Time    WBC 11.6 07/12/2022 04:16 PM    NEUTROABS 7.0 12/10/2020 01:13 PM    HGB 15.9 07/12/2022 04:16 PM    HCT 48.8 07/12/2022 04:16 PM    MCV 86.7 07/12/2022 04:16 PM     07/12/2022 04:16 PM     Lab Results   Component Value Date    TSH 2.75 04/12/2022       ASSESSMENT/PLAN    Diagnosis Orders   1. Type 2 diabetes mellitus with complication (HCC)  Comprehensive Metabolic Panel    Hemoglobin A1C      2. Anxiety  clonazePAM (KLONOPIN) 1 MG tablet    traZODone (DESYREL) 150 MG tablet      3. Diabetic polyneuropathy associated with type 2 diabetes mellitus (HCC)  pregabalin (LYRICA) 50 MG capsule      4. History of liver transplant (HCC)  tacrolimus (PROGRAF) 1 MG capsule      5. SOB (shortness of breath)  Fluticasone furoate-vilanterol (BREO ELLIPTA) 200-25 MCG/INH AEPB inhaler      6. Congestive heart failure, unspecified HF chronicity, unspecified heart failure type (HCC)  Fluticasone furoate-vilanterol (BREO ELLIPTA) 200-25 MCG/INH AEPB inhaler      7. Arthritis  oxyCODONE-acetaminophen (PERCOCET)  MG per tablet      8. Hypertension, unspecified type  metoprolol tartrate (LOPRESSOR) 50 MG tablet          Orders Placed This Encounter   Medications    clonazePAM (KLONOPIN) 1 MG tablet     Sig: Take 1 tablet by mouth 2 times daily as needed for Anxiety for up to 30 days.      Dispense:  60 tablet     Refill:  2    metoprolol tartrate (LOPRESSOR) 50 MG tablet     Sig: Take 0.5 tablets by mouth 2 times daily     Dispense:  90 tablet     Refill:  3    pregabalin (LYRICA) 50 MG capsule     Sig: Take 1 capsule by mouth 3 times daily for 30 days. Stop the oxycodone     Dispense:  270 capsule     Refill:  1    levothyroxine (SYNTHROID) 88 MCG tablet     Sig: TAKE ONE TABLET BY MOUTH DAILY WITH WATER ON AN EMPTY STOMACH, WAIT 30 MINUTES BEFORE EATING OR TAKING OTHER MEDS     Dispense:  90 tablet     Refill:  3    tacrolimus (PROGRAF) 1 MG capsule     Sig: TAKE ONE CAPSULE BY MOUTH TWICE A DAY     Dispense:  180 capsule     Refill:  3    traZODone (DESYREL) 150 MG tablet     Sig: TAKE ONE TABLET BY MOUTH ONCE NIGHTLY     Dispense:  90 tablet     Refill:  3    pravastatin (PRAVACHOL) 40 MG tablet     Sig: TAKE ONE TABLET BY MOUTH DAILY     Dispense:  90 tablet     Refill:  3    pantoprazole (PROTONIX) 40 MG tablet     Sig: Take 1 tablet by mouth daily (with breakfast)     Dispense:  90 tablet     Refill:  3    albuterol (PROVENTIL) (2.5 MG/3ML) 0.083% nebulizer solution     Sig: Take 3 mLs by nebulization every 4 hours as needed for Wheezing     Dispense:  25 each     Refill:  2    ELIQUIS 2.5 MG TABS tablet     Sig: Take 1 tablet by mouth 2 times daily     Dispense:  180 tablet     Refill:  3    Fluticasone furoate-vilanterol (BREO ELLIPTA) 200-25 MCG/INH AEPB inhaler     Sig: Inhale 1 puff into the lungs daily     Dispense:  1 each     Refill:  0    DISCONTD: PROAIR RESPICLICK 240 (90 Base) MCG/ACT aerosol powder inhalation     Sig: Inhale 200 puffs into the lungs 2 times daily     Dispense:  1 each     Refill:  5    nitroGLYCERIN (NITROSTAT) 0.4 MG SL tablet     Sig: Place 1 tablet under the tongue every 5 minutes as needed for Chest pain     Dispense:  25 tablet     Refill:  0    oxyCODONE-acetaminophen (PERCOCET)  MG per tablet     Sig: Take 1 tablet by mouth every 6 hours as needed for Pain for up to 30 days.      Dispense:  90 tablet     Refill:  0     Reduce doses taken as pain becomes manageable        Medications Discontinued During This Encounter   Medication Reason    nitroGLYCERIN (NITROSTAT) 0.4 MG SL tablet REORDER    PROAIR RESPICLICK 178 (90 Base) MCG/ACT aerosol powder inhalation REORDER    Fluticasone furoate-vilanterol (BREO ELLIPTA) 200-25 MCG/INH AEPB inhaler REORDER    ELIQUIS 2.5 MG TABS tablet REORDER    albuterol (PROVENTIL) (2.5 MG/3ML) 0.083% nebulizer solution REORDER    pantoprazole (PROTONIX) 40 MG tablet REORDER    levothyroxine (SYNTHROID) 88 MCG tablet REORDER    tacrolimus (PROGRAF) 1 MG capsule REORDER    traZODone (DESYREL) 150 MG tablet REORDER    pravastatin (PRAVACHOL) 40 MG tablet REORDER    oxyCODONE-acetaminophen (PERCOCET)  MG per tablet REORDER    clonazePAM (KLONOPIN) 1 MG tablet REORDER    pregabalin (LYRICA) 50 MG capsule REORDER    metoprolol tartrate (LOPRESSOR) 50 MG tablet REORDER       Controlled Substances Monitoring:      Please note: This chart was generated using Dragon dictation software. Although every effort was made to ensure the accuracy of this automated transcription, some errors in transcription may have occurred.

## 2022-07-12 NOTE — PROGRESS NOTES
Chief Complaint   Patient presents with    Diabetes       Have you seen any other physician or provider since your last visit no    Have you had any other diagnostic tests since your last visit? no    Have you changed or stopped any medications since your last visit? no     I have recommended that this patient have a Colonoscopy but he declines at this time. I have discussed the risks and benefits of this examination with him. The patient verbalizes understanding.

## 2022-07-13 LAB
A/G RATIO: 1.3 (ref 0.8–2)
ALBUMIN SERPL-MCNC: 4 G/DL (ref 3.4–4.8)
ALP BLD-CCNC: 258 U/L (ref 25–100)
ALT SERPL-CCNC: 36 U/L (ref 4–36)
ANION GAP SERPL CALCULATED.3IONS-SCNC: 11 MMOL/L (ref 3–16)
AST SERPL-CCNC: 35 U/L (ref 8–33)
BILIRUB SERPL-MCNC: 0.7 MG/DL (ref 0.3–1.2)
BUN BLDV-MCNC: 31 MG/DL (ref 6–20)
CALCIUM SERPL-MCNC: 9.3 MG/DL (ref 8.5–10.5)
CHLORIDE BLD-SCNC: 95 MMOL/L (ref 98–107)
CO2: 26 MMOL/L (ref 20–30)
CREAT SERPL-MCNC: 1.8 MG/DL (ref 0.4–1.2)
GAMMA GLUTAMYL TRANSFERASE: 393 U/L (ref 8–61)
GFR AFRICAN AMERICAN: 47
GFR NON-AFRICAN AMERICAN: 39
GLOBULIN: 3.2 G/DL
GLUCOSE BLD-MCNC: 219 MG/DL (ref 74–106)
HBA1C MFR BLD: 10 %
HCT VFR BLD CALC: 48.8 % (ref 40–54)
HEMOGLOBIN: 15.9 G/DL (ref 13–18)
MAGNESIUM: 2 MG/DL (ref 1.7–2.4)
MCH RBC QN AUTO: 28.2 PG (ref 27–32)
MCHC RBC AUTO-ENTMCNC: 32.6 G/DL (ref 31–35)
MCV RBC AUTO: 86.7 FL (ref 80–100)
PDW BLD-RTO: 14.9 % (ref 11–16)
PLATELET # BLD: 135 K/UL (ref 150–400)
PMV BLD AUTO: 10.5 FL (ref 6–10)
POTASSIUM SERPL-SCNC: 4.7 MMOL/L (ref 3.4–5.1)
RBC # BLD: 5.63 M/UL (ref 4.5–6)
SODIUM BLD-SCNC: 132 MMOL/L (ref 136–145)
TOTAL PROTEIN: 7.2 G/DL (ref 6.4–8.3)
WBC # BLD: 11.6 K/UL (ref 4–11)

## 2022-07-14 ENCOUNTER — TELEPHONE (OUTPATIENT)
Dept: FAMILY MEDICINE CLINIC | Age: 62
End: 2022-07-14

## 2022-07-14 RX ORDER — ALBUTEROL SULFATE 90 UG/1
2 POWDER, METERED RESPIRATORY (INHALATION) EVERY 4 HOURS PRN
Qty: 1 EACH | Refills: 5 | Status: SHIPPED | OUTPATIENT
Start: 2022-07-14

## 2022-07-14 NOTE — TELEPHONE ENCOUNTER
[address] called and they are needing to know the correct frequency for the proair respiclick.

## 2022-07-14 NOTE — PROGRESS NOTES
SUBJECTIVE:    Patient ID: Fred Alvares is a 65 y.o. male. No chief complaint on file. HPI:    Patient's medications,allergies, past medical, surgical, social and family histories were reviewed and updated as appropriate. .  Current Outpatient Medications on File Prior to Visit   Medication Sig Dispense Refill    clonazePAM (KLONOPIN) 1 MG tablet Take 1 tablet by mouth 2 times daily as needed for Anxiety for up to 30 days. 60 tablet 2    metoprolol tartrate (LOPRESSOR) 50 MG tablet Take 0.5 tablets by mouth 2 times daily 90 tablet 3    pregabalin (LYRICA) 50 MG capsule Take 1 capsule by mouth 3 times daily for 30 days. Stop the oxycodone 270 capsule 1    levothyroxine (SYNTHROID) 88 MCG tablet TAKE ONE TABLET BY MOUTH DAILY WITH WATER ON AN EMPTY STOMACH, WAIT 30 MINUTES BEFORE EATING OR TAKING OTHER MEDS 90 tablet 3    tacrolimus (PROGRAF) 1 MG capsule TAKE ONE CAPSULE BY MOUTH TWICE A  capsule 3    traZODone (DESYREL) 150 MG tablet TAKE ONE TABLET BY MOUTH ONCE NIGHTLY 90 tablet 3    pravastatin (PRAVACHOL) 40 MG tablet TAKE ONE TABLET BY MOUTH DAILY 90 tablet 3    pantoprazole (PROTONIX) 40 MG tablet Take 1 tablet by mouth daily (with breakfast) 90 tablet 3    albuterol (PROVENTIL) (2.5 MG/3ML) 0.083% nebulizer solution Take 3 mLs by nebulization every 4 hours as needed for Wheezing 25 each 2    ELIQUIS 2.5 MG TABS tablet Take 1 tablet by mouth 2 times daily 180 tablet 3    Fluticasone furoate-vilanterol (BREO ELLIPTA) 200-25 MCG/INH AEPB inhaler Inhale 1 puff into the lungs daily 1 each 0    PROAIR RESPICLICK 956 (90 Base) MCG/ACT aerosol powder inhalation Inhale 200 puffs into the lungs 2 times daily 1 each 5    nitroGLYCERIN (NITROSTAT) 0.4 MG SL tablet Place 1 tablet under the tongue every 5 minutes as needed for Chest pain 25 tablet 0    oxyCODONE-acetaminophen (PERCOCET)  MG per tablet Take 1 tablet by mouth every 6 hours as needed for Pain for up to 30 days.  80 tablet 0    Cholecalciferol (VITAMIN D3) 368062 UNIT/GM POWD 1 tab(s) orally once a day (in the evening)      Insulin Glargine, 2 Unit Dial, 300 UNIT/ML SOPN Inject 50 Units into the skin daily 3 mL 5    dofetilide (TIKOSYN) 250 MCG capsule Take 250 mcg by mouth 2 times daily       Current Facility-Administered Medications on File Prior to Visit   Medication Dose Route Frequency Provider Last Rate Last Admin    0.9 % sodium chloride infusion   IntraVENous Once Manda Guzman MD   Stopped at 09/16/21 1426    0.9 % sodium chloride infusion   IntraVENous Once Manda Guzman MD   Stopped at 09/14/21 1229       Review of Systems    OBJECTIVE:  There were no vitals taken for this visit.    Physical Exam    Results in Past 30 Days  Result Component Current Result Ref Range Previous Result Ref Range   Albumin/Globulin Ratio 1.3 (7/12/2022) 0.8 - 2.0 Not in Time Range    Albumin 4.0 (7/12/2022) 3.4 - 4.8 g/dL Not in Time Range    Alkaline Phosphatase 258 (H) (7/12/2022) 25 - 100 U/L Not in Time Range    ALT 36 (7/12/2022) 4 - 36 U/L Not in Time Range    AST 35 (H) (7/12/2022) 8 - 33 U/L Not in Time Range    BUN 31 (H) (7/12/2022) 6 - 20 mg/dL Not in Time Range    Calcium 9.3 (7/12/2022) 8.5 - 10.5 mg/dL Not in Time Range    Chloride 95 (L) (7/12/2022) 98 - 107 mmol/L Not in Time Range    CO2 26 (7/12/2022) 20 - 30 mmol/L Not in Time Range    CREATININE 1.8 (H) (7/12/2022) 0.4 - 1.2 mg/dL Not in Time Range    GFR  47 (L) (7/12/2022) >59 Not in Time Range    GFR Non- 39 (L) (7/12/2022) >59 Not in Time Range    Globulin 3.2 (7/12/2022) Not Established g/dL Not in Time Range    Glucose 219 (H) (7/12/2022) 74 - 106 mg/dL Not in Time Range    Potassium 4.7 (7/12/2022) 3.4 - 5.1 mmol/L Not in Time Range    Sodium 132 (L) (7/12/2022) 136 - 145 mmol/L Not in Time Range    Total Bilirubin 0.7 (7/12/2022) 0.3 - 1.2 mg/dL Not in Time Range    Total Protein 7.2 (7/12/2022) 6.4 - 8.3 g/dL Not in Time Range        Hemoglobin A1C (%)   Date Value   07/12/2022 10.0 (H)     Microalbumin, Random Urine (mg/dL)   Date Value   12/15/2021 62.40 (H)     LDL Calculated (mg/dL)   Date Value   01/12/2022 44           Lab Results   Component Value Date    TSH 2.75 04/12/2022         ASSESSMENT/PLAN:     There are no diagnoses linked to this encounter. There are no discontinued medications.

## 2022-10-11 ENCOUNTER — OFFICE VISIT (OUTPATIENT)
Dept: FAMILY MEDICINE CLINIC | Age: 62
End: 2022-10-11
Payer: MEDICARE

## 2022-10-11 ENCOUNTER — HOSPITAL ENCOUNTER (OUTPATIENT)
Facility: HOSPITAL | Age: 62
Discharge: HOME | End: 2022-10-11
Payer: MEDICARE

## 2022-10-11 VITALS
HEART RATE: 111 BPM | TEMPERATURE: 98.4 F | HEIGHT: 71 IN | OXYGEN SATURATION: 99 % | DIASTOLIC BLOOD PRESSURE: 86 MMHG | SYSTOLIC BLOOD PRESSURE: 134 MMHG | RESPIRATION RATE: 15 BRPM | WEIGHT: 229.8 LBS | BODY MASS INDEX: 32.17 KG/M2

## 2022-10-11 DIAGNOSIS — E11.8 TYPE 2 DIABETES MELLITUS WITH COMPLICATION (HCC): Primary | ICD-10-CM

## 2022-10-11 DIAGNOSIS — E11.42 DIABETIC POLYNEUROPATHY ASSOCIATED WITH TYPE 2 DIABETES MELLITUS (HCC): ICD-10-CM

## 2022-10-11 DIAGNOSIS — R06.02 SOB (SHORTNESS OF BREATH): ICD-10-CM

## 2022-10-11 DIAGNOSIS — I10 HYPERTENSION, UNSPECIFIED TYPE: ICD-10-CM

## 2022-10-11 DIAGNOSIS — I50.9 CONGESTIVE HEART FAILURE, UNSPECIFIED HF CHRONICITY, UNSPECIFIED HEART FAILURE TYPE (HCC): ICD-10-CM

## 2022-10-11 DIAGNOSIS — F41.9 ANXIETY: ICD-10-CM

## 2022-10-11 DIAGNOSIS — Z12.5 SCREENING FOR PROSTATE CANCER: ICD-10-CM

## 2022-10-11 DIAGNOSIS — R53.83 FATIGUE, UNSPECIFIED TYPE: ICD-10-CM

## 2022-10-11 DIAGNOSIS — M19.90 ARTHRITIS: ICD-10-CM

## 2022-10-11 DIAGNOSIS — E11.8 TYPE 2 DIABETES MELLITUS WITH COMPLICATION (HCC): ICD-10-CM

## 2022-10-11 PROCEDURE — 84153 ASSAY OF PSA TOTAL: CPT

## 2022-10-11 PROCEDURE — 83036 HEMOGLOBIN GLYCOSYLATED A1C: CPT

## 2022-10-11 PROCEDURE — 99214 OFFICE O/P EST MOD 30 MIN: CPT | Performed by: FAMILY MEDICINE

## 2022-10-11 PROCEDURE — 85027 COMPLETE CBC AUTOMATED: CPT

## 2022-10-11 PROCEDURE — 3046F HEMOGLOBIN A1C LEVEL >9.0%: CPT | Performed by: FAMILY MEDICINE

## 2022-10-11 PROCEDURE — 36415 COLL VENOUS BLD VENIPUNCTURE: CPT

## 2022-10-11 PROCEDURE — 80053 COMPREHEN METABOLIC PANEL: CPT

## 2022-10-11 PROCEDURE — 83735 ASSAY OF MAGNESIUM: CPT

## 2022-10-11 PROCEDURE — 82977 ASSAY OF GGT: CPT

## 2022-10-11 RX ORDER — PREGABALIN 50 MG/1
50 CAPSULE ORAL 3 TIMES DAILY
Qty: 270 CAPSULE | Refills: 1 | Status: SHIPPED | OUTPATIENT
Start: 2022-10-11 | End: 2022-11-10

## 2022-10-11 ASSESSMENT — ENCOUNTER SYMPTOMS: BACK PAIN: 1

## 2022-10-11 NOTE — PROGRESS NOTES
SUBJECTIVE:    Patient ID: Tammie Costello is a 65 y.o. male. Chief Complaint   Patient presents with    Diabetes    Hypertension       HPI: office visit  Is in the office today in follow-up of his diabetes. He admits he has not been checking his blood sugars. He says he is definitely not been doing well with the blood sugar recently. He says he has had some cough and congestion the last few days but it is better. He has not had any significant worsening of his shortness of breath. He has not had any significant swelling. His blood pressure seem to be stable at home. He denies any recent falls or injuries. He does not have any medication problems. Total he admits that he is noncompliant with his diet and with his diabetic medication. Having some vision problems today  Review of Systems   Musculoskeletal:  Positive for back pain and gait problem. All other systems reviewed and are negative. OBJECTIVE:  /86 (Site: Left Upper Arm, Position: Sitting, Cuff Size: Medium Adult)   Pulse (!) 111   Temp 98.4 °F (36.9 °C) (Temporal)   Resp 15   Ht 5' 11\" (1.803 m)   Wt 229 lb 12.8 oz (104.2 kg)   SpO2 99%   BMI 32.05 kg/m²    Wt Readings from Last 3 Encounters:   10/11/22 229 lb 12.8 oz (104.2 kg)   07/12/22 224 lb (101.6 kg)   06/02/22 226 lb (102.5 kg)     BP Readings from Last 3 Encounters:   10/11/22 134/86   07/12/22 132/66   06/02/22 130/60      Pulse Readings from Last 3 Encounters:   10/11/22 (!) 111   07/12/22 82   06/02/22 84     Body mass index is 32.05 kg/m². Resp Readings from Last 3 Encounters:   10/11/22 15   07/12/22 18   06/02/22 16     Past medical, surgical, family and social history were reviewed and updated with the patient. Physical Exam  Vitals and nursing note reviewed. Constitutional:       Appearance: He is well-developed. HENT:      Head: Normocephalic and atraumatic.       Right Ear: Hearing and external ear normal.      Left Ear: Hearing and external ear normal.      Nose: Nose normal.      Mouth/Throat:      Pharynx: Uvula midline. Eyes:      General: Lids are normal.      Conjunctiva/sclera: Conjunctivae normal.      Pupils: Pupils are equal, round, and reactive to light. Neck:      Thyroid: No thyroid mass or thyromegaly. Vascular: No carotid bruit. Trachea: Trachea and phonation normal.   Cardiovascular:      Rate and Rhythm: Normal rate and regular rhythm. Pulses: Normal pulses. Heart sounds: Normal heart sounds, S1 normal and S2 normal. No murmur heard. No friction rub. No gallop. Pulmonary:      Effort: Pulmonary effort is normal.      Breath sounds: Normal breath sounds. Musculoskeletal:      Cervical back: Normal range of motion and neck supple. Spasms and tenderness present. Lumbar back: Spasms and tenderness present. Decreased range of motion. Skin:     General: Skin is warm and dry. Neurological:      Mental Status: He is alert and oriented to person, place, and time. Mental status is at baseline. Psychiatric:         Attention and Perception: Attention and perception normal.         Mood and Affect: Mood and affect normal.         Speech: Speech normal.         Behavior: Behavior is cooperative. Thought Content: Thought content normal.         Cognition and Memory: Cognition and memory normal.         Judgment: Judgment normal.        No results found for requested labs within last 30 days.      Hemoglobin A1C (%)   Date Value   07/12/2022 10.0 (H)     Microalbumin, Random Urine (mg/dL)   Date Value   12/15/2021 62.40 (H)     LDL Calculated (mg/dL)   Date Value   01/12/2022 44       Lab Results   Component Value Date/Time    WBC 11.6 07/12/2022 04:16 PM    NEUTROABS 7.0 12/10/2020 01:13 PM    HGB 15.9 07/12/2022 04:16 PM    HCT 48.8 07/12/2022 04:16 PM    MCV 86.7 07/12/2022 04:16 PM     07/12/2022 04:16 PM     Lab Results   Component Value Date    TSH 2.75 04/12/2022       ASSESSMENT/PLAN Diagnosis Orders   1. Type 2 diabetes mellitus with complication (HCC)  Comprehensive Metabolic Panel    Hemoglobin A1C      2. Screening for prostate cancer  PSA Screening      3. SOB (shortness of breath)  I have encouraged him to make sure and use his medications daily. His compliance is important      4. Arthritis  Progressively worsening with age      11. Congestive heart failure, unspecified HF chronicity, unspecified heart failure type ([facility] [address]  No significant swelling at this time      6. Hypertension, unspecified type  Appears to be relatively stable with meds      7. Fatigue, unspecified type        8. Diabetic polyneuropathy associated with type 2 diabetes mellitus (HCC)  pregabalin (LYRICA) 50 MG capsule      9. Anxiety  Seems to have some to do with his noncompliance. Orders Placed This Encounter   Medications    pregabalin (LYRICA) 50 MG capsule     Sig: Take 1 capsule by mouth 3 times daily for 30 days. Stop the oxycodone     Dispense:  270 capsule     Refill:  1          Medications Discontinued During This Encounter   Medication Reason    pregabalin (LYRICA) 50 MG capsule REORDER       Controlled Substances Monitoring:      Please note: This chart was generated using Dragon dictation software. Although every effort was made to ensure the accuracy of this automated transcription, some errors in transcription may have occurred.

## 2022-10-11 NOTE — PROGRESS NOTES
Chief Complaint   Patient presents with    Diabetes    Hypertension       Have you seen any other physician or provider since your last visit no    Have you had any other diagnostic tests since your last visit? no    Have you changed or stopped any medications since your last visit? no     I have recommended that this patient have a sigmoidoscopy but he due to refusal reason: no symptoms  I have discussed the risks and benefits of this examination with him. The patient verbalizes understanding. Provider will be informed of refusal.      Diabetic retinal exam completed this year?  No                       * If yes please have patient sign a records release to obtain record to update Health Maintenance                       * If no, please order referral for patient to be scheduled     65year old here for follow up

## 2022-10-12 DIAGNOSIS — N28.9 RENAL INSUFFICIENCY: Primary | ICD-10-CM

## 2022-10-12 LAB
A/G RATIO: 1.1 (ref 0.8–2)
ALBUMIN SERPL-MCNC: 3.7 G/DL (ref 3.4–4.8)
ALP BLD-CCNC: 224 U/L (ref 25–100)
ALT SERPL-CCNC: 36 U/L (ref 4–36)
ANION GAP SERPL CALCULATED.3IONS-SCNC: 13 MMOL/L (ref 3–16)
AST SERPL-CCNC: 23 U/L (ref 8–33)
BILIRUB SERPL-MCNC: 0.7 MG/DL (ref 0.3–1.2)
BUN BLDV-MCNC: 31 MG/DL (ref 6–20)
CALCIUM SERPL-MCNC: 9.3 MG/DL (ref 8.5–10.5)
CHLORIDE BLD-SCNC: 88 MMOL/L (ref 98–107)
CO2: 27 MMOL/L (ref 20–30)
CREAT SERPL-MCNC: 2.1 MG/DL (ref 0.4–1.2)
GAMMA GLUTAMYL TRANSFERASE: 322 U/L (ref 8–61)
GFR AFRICAN AMERICAN: 39
GFR NON-AFRICAN AMERICAN: 32
GLOBULIN: 3.3 G/DL
GLUCOSE BLD-MCNC: 349 MG/DL (ref 74–106)
HBA1C MFR BLD: 10.9 %
HCT VFR BLD CALC: 44.1 % (ref 40–54)
HEMOGLOBIN: 14.8 G/DL (ref 13–18)
MAGNESIUM: 1.7 MG/DL (ref 1.7–2.4)
MCH RBC QN AUTO: 28.3 PG (ref 27–32)
MCHC RBC AUTO-ENTMCNC: 33.6 G/DL (ref 31–35)
MCV RBC AUTO: 84.3 FL (ref 80–100)
PDW BLD-RTO: 14.7 % (ref 11–16)
PLATELET # BLD: 147 K/UL (ref 150–400)
PMV BLD AUTO: 10.9 FL (ref 6–10)
POTASSIUM SERPL-SCNC: 4.7 MMOL/L (ref 3.4–5.1)
PROSTATE SPECIFIC ANTIGEN: 0.1 NG/ML (ref 0–4)
RBC # BLD: 5.23 M/UL (ref 4.5–6)
SODIUM BLD-SCNC: 128 MMOL/L (ref 136–145)
TOTAL PROTEIN: 7 G/DL (ref 6.4–8.3)
WBC # BLD: 11.4 K/UL (ref 4–11)

## 2022-12-01 ENCOUNTER — TELEPHONE (OUTPATIENT)
Dept: FAMILY MEDICINE CLINIC | Age: 62
End: 2022-12-01

## 2022-12-01 DIAGNOSIS — I50.9 CONGESTIVE HEART FAILURE, UNSPECIFIED HF CHRONICITY, UNSPECIFIED HEART FAILURE TYPE (HCC): ICD-10-CM

## 2022-12-01 DIAGNOSIS — R06.02 SOB (SHORTNESS OF BREATH): ICD-10-CM

## 2022-12-01 DIAGNOSIS — Z94.4 HISTORY OF LIVER TRANSPLANT (HCC): ICD-10-CM

## 2022-12-01 NOTE — TELEPHONE ENCOUNTER
Patient called and is experiencing cough and congestion and is wondering if you could send him something for it?

## 2022-12-04 RX ORDER — BROMPHENIRAMINE MALEATE, PSEUDOEPHEDRINE HYDROCHLORIDE, AND DEXTROMETHORPHAN HYDROBROMIDE 2; 30; 10 MG/5ML; MG/5ML; MG/5ML
5 SYRUP ORAL 4 TIMES DAILY PRN
Qty: 473 ML | Refills: 0 | Status: SHIPPED | OUTPATIENT
Start: 2022-12-04

## 2022-12-05 RX ORDER — NITROGLYCERIN 0.4 MG/1
TABLET SUBLINGUAL
Qty: 25 TABLET | Refills: 0 | Status: SHIPPED | OUTPATIENT
Start: 2022-12-05

## 2022-12-05 RX ORDER — TACROLIMUS 1 MG/1
CAPSULE ORAL
Qty: 180 CAPSULE | Refills: 3 | Status: SHIPPED | OUTPATIENT
Start: 2022-12-05

## 2022-12-05 NOTE — PROGRESS NOTES
SUBJECTIVE:    Patient ID: Alejandra Celestin is a 65 y.o. male. No chief complaint on file. HPI:    Patient's medications,allergies, past medical, surgical, social and family histories were reviewed and updated as appropriate. .  Current Outpatient Medications on File Prior to Visit   Medication Sig Dispense Refill    pregabalin (LYRICA) 50 MG capsule Take 1 capsule by mouth 3 times daily for 30 days. Stop the oxycodone 270 capsule 1    PROAIR RESPICLICK 321 (90 Base) MCG/ACT aerosol powder inhalation Inhale 2 puffs into the lungs every 4 hours as needed for Wheezing or Shortness of Breath (Patient not taking: Reported on 10/11/2022) 1 each 5    clonazePAM (KLONOPIN) 1 MG tablet Take 1 tablet by mouth 2 times daily as needed for Anxiety for up to 30 days.  60 tablet 2    metoprolol tartrate (LOPRESSOR) 50 MG tablet Take 0.5 tablets by mouth 2 times daily 90 tablet 3    levothyroxine (SYNTHROID) 88 MCG tablet TAKE ONE TABLET BY MOUTH DAILY WITH WATER ON AN EMPTY STOMACH, WAIT 30 MINUTES BEFORE EATING OR TAKING OTHER MEDS 90 tablet 3    tacrolimus (PROGRAF) 1 MG capsule TAKE ONE CAPSULE BY MOUTH TWICE A  capsule 3    traZODone (DESYREL) 150 MG tablet TAKE ONE TABLET BY MOUTH ONCE NIGHTLY (Patient not taking: Reported on 10/11/2022) 90 tablet 3    pravastatin (PRAVACHOL) 40 MG tablet TAKE ONE TABLET BY MOUTH DAILY 90 tablet 3    pantoprazole (PROTONIX) 40 MG tablet Take 1 tablet by mouth daily (with breakfast) 90 tablet 3    albuterol (PROVENTIL) (2.5 MG/3ML) 0.083% nebulizer solution Take 3 mLs by nebulization every 4 hours as needed for Wheezing 25 each 2    ELIQUIS 2.5 MG TABS tablet Take 1 tablet by mouth 2 times daily 180 tablet 3    Fluticasone furoate-vilanterol (BREO ELLIPTA) 200-25 MCG/INH AEPB inhaler Inhale 1 puff into the lungs daily 1 each 0    nitroGLYCERIN (NITROSTAT) 0.4 MG SL tablet Place 1 tablet under the tongue every 5 minutes as needed for Chest pain 25 tablet 0    Cholecalciferol (VITAMIN D3) 909414 UNIT/GM POWD 1 tab(s) orally once a day (in the evening)      Insulin Glargine, 2 Unit Dial, 300 UNIT/ML SOPN Inject 50 Units into the skin daily 3 mL 5    dofetilide (TIKOSYN) 250 MCG capsule Take 250 mcg by mouth 2 times daily       Current Facility-Administered Medications on File Prior to Visit   Medication Dose Route Frequency Provider Last Rate Last Admin    0.9 % sodium chloride infusion   IntraVENous Once Cali Morgan MD   Stopped at 09/16/21 1426    0.9 % sodium chloride infusion   IntraVENous Once Cali Morgan MD   Stopped at 09/14/21 1229       Review of Systems    OBJECTIVE:  There were no vitals taken for this visit. Physical Exam    No results found for requested labs within last 30 days. Hemoglobin A1C (%)   Date Value   10/11/2022 10.9 (H)     Microalbumin, Random Urine (mg/dL)   Date Value   12/15/2021 62.40 (H)     LDL Calculated (mg/dL)   Date Value   01/12/2022 44           Lab Results   Component Value Date    TSH 2.75 04/12/2022         ASSESSMENT/PLAN:     There are no diagnoses linked to this encounter. There are no discontinued medications.

## 2022-12-20 ENCOUNTER — OFFICE VISIT (OUTPATIENT)
Dept: FAMILY MEDICINE CLINIC | Age: 62
End: 2022-12-20
Payer: MEDICARE

## 2022-12-20 VITALS — OXYGEN SATURATION: 98 %

## 2022-12-20 DIAGNOSIS — B96.89 ACUTE BACTERIAL SINUSITIS: Primary | ICD-10-CM

## 2022-12-20 DIAGNOSIS — R52 BODY ACHES: ICD-10-CM

## 2022-12-20 DIAGNOSIS — J01.90 ACUTE BACTERIAL SINUSITIS: Primary | ICD-10-CM

## 2022-12-20 LAB
Lab: NORMAL
QC PASS/FAIL: NORMAL
SARS-COV-2 RDRP RESP QL NAA+PROBE: NEGATIVE

## 2022-12-20 PROCEDURE — 99213 OFFICE O/P EST LOW 20 MIN: CPT | Performed by: NURSE PRACTITIONER

## 2022-12-20 PROCEDURE — 87635 SARS-COV-2 COVID-19 AMP PRB: CPT | Performed by: NURSE PRACTITIONER

## 2022-12-20 RX ORDER — AZITHROMYCIN 250 MG/1
250 TABLET, FILM COATED ORAL SEE ADMIN INSTRUCTIONS
Qty: 6 TABLET | Refills: 0 | Status: SHIPPED | OUTPATIENT
Start: 2022-12-20 | End: 2022-12-25

## 2022-12-20 ASSESSMENT — ENCOUNTER SYMPTOMS
COUGH: 1
SINUS PRESSURE: 1

## 2022-12-20 NOTE — PROGRESS NOTES
Willistine Hamman 65 y.o. presents today for   Chief Complaint   Patient presents with    Cough     Can't hardly breath at night    Congestion     Sick symptoms since     Generalized Body Aches    Pharyngitis        HPI:  Willistine Hamman  states he has been sick since 22. He had COVID on 21 and was vaccinated but says it put him into afib so he was advised not to take anymore. This is his third time being sick this year and says he stays sick for a long time each time. Today he is having shortness of breath and feels dizzy/lightheaded when he moves around. His 02 at home runs between 94-97% at home. He is also coughing but says it won't come out. He says his lungs feel heavy. He is unsure if he has ran a fever. Left ear hurts and he has a sore throat but says that is some better than it was.      Family History   Problem Relation Age of Onset    Vision Loss Mother     Diabetes Father     Heart Disease Father     High Blood Pressure Father     High Cholesterol Father     Early Death Brother     Vision Loss Brother     Depression Maternal Aunt     Arthritis Maternal Grandmother     Arthritis Maternal Grandfather     Vision Loss Maternal Grandfather     Arthritis Paternal Grandmother     Diabetes Paternal Grandmother     High Blood Pressure Paternal Grandmother     Arthritis Paternal Grandfather     Cancer Paternal Grandfather     Miscarriages / Roanna Mimes Daughter     Vision Loss Daughter         Social History     Socioeconomic History    Marital status:      Spouse name: Not on file    Number of children: Not on file    Years of education: Not on file    Highest education level: Not on file   Occupational History    Not on file   Tobacco Use    Smoking status: Former     Packs/day: 0.25     Years: 45.00     Pack years: 11.25     Types: Cigarettes     Start date:      Quit date: 2021     Years since quittin.2    Smokeless tobacco: Never   Substance and Sexual Activity    Alcohol use: No     Alcohol/week: 0.0 standard drinks    Drug use: No    Sexual activity: Not on file   Other Topics Concern    Not on file   Social History Narrative    Not on file     Social Determinants of Health     Financial Resource Strain: Low Risk     Difficulty of Paying Living Expenses: Not very hard   Food Insecurity: No Food Insecurity    Worried About Running Out of Food in the Last Year: Never true    Ran Out of Food in the Last Year: Never true   Transportation Needs: Not on file   Physical Activity: Inactive    Days of Exercise per Week: 0 days    Minutes of Exercise per Session: 0 min   Stress: Not on file   Social Connections: Not on file   Intimate Partner Violence: Not on file   Housing Stability: Not on file        Past Surgical History:   Procedure Laterality Date    CORONARY ANGIOPLASTY      LIVER TRANSPLANT          Past Medical History:   Diagnosis Date    CHF (congestive heart failure) (HCC)     Crohn disease (HCC)     Osteoarthritis     Vision abnormalities         Current Outpatient Medications   Medication Sig Dispense Refill    azithromycin (ZITHROMAX) 250 MG tablet Take 1 tablet by mouth See Admin Instructions for 5 days 500mg on day 1 followed by 250mg on days 2 - 5 6 tablet 0    nitroGLYCERIN (NITROSTAT) 0.4 MG SL tablet DISSOLVE 1 TAB UNDER TONGUE FOR CHEST PAIN - IF PAIN REMAINS AFTER 5 MIN, CALL 911 AND REPEAT DOSE. MAX 3 TABS IN 15 MINUTES 25 tablet 0    fluticasone furoate-vilanterol (BREO ELLIPTA) 200-25 MCG/ACT AEPB inhaler INHALE ONE PUFF BY MOUTH DAILY 1 each 2    tacrolimus (PROGRAF) 1 MG capsule TAKE ONE CAPSULE BY MOUTH TWICE A  capsule 3    brompheniramine-pseudoephedrine-DM (BROMFED DM) 2-30-10 MG/5ML syrup Take 5 mLs by mouth 4 times daily as needed for Congestion or Cough 473 mL 0    pregabalin (LYRICA) 50 MG capsule Take 1 capsule by mouth 3 times daily for 30 days.  Stop the oxycodone 270 capsule 1    PROAIR RESPICLICK 558 (90 Base) MCG/ACT aerosol powder inhalation Inhale 2 puffs into the lungs every 4 hours as needed for Wheezing or Shortness of Breath (Patient not taking: Reported on 10/11/2022) 1 each 5    clonazePAM (KLONOPIN) 1 MG tablet Take 1 tablet by mouth 2 times daily as needed for Anxiety for up to 30 days. 60 tablet 2    metoprolol tartrate (LOPRESSOR) 50 MG tablet Take 0.5 tablets by mouth 2 times daily 90 tablet 3    levothyroxine (SYNTHROID) 88 MCG tablet TAKE ONE TABLET BY MOUTH DAILY WITH WATER ON AN EMPTY STOMACH, WAIT 30 MINUTES BEFORE EATING OR TAKING OTHER MEDS 90 tablet 3    traZODone (DESYREL) 150 MG tablet TAKE ONE TABLET BY MOUTH ONCE NIGHTLY (Patient not taking: Reported on 10/11/2022) 90 tablet 3    pravastatin (PRAVACHOL) 40 MG tablet TAKE ONE TABLET BY MOUTH DAILY 90 tablet 3    pantoprazole (PROTONIX) 40 MG tablet Take 1 tablet by mouth daily (with breakfast) 90 tablet 3    albuterol (PROVENTIL) (2.5 MG/3ML) 0.083% nebulizer solution Take 3 mLs by nebulization every 4 hours as needed for Wheezing 25 each 2    ELIQUIS 2.5 MG TABS tablet Take 1 tablet by mouth 2 times daily 180 tablet 3    Cholecalciferol (VITAMIN D3) 891318 UNIT/GM POWD 1 tab(s) orally once a day (in the evening)      Insulin Glargine, 2 Unit Dial, 300 UNIT/ML SOPN Inject 50 Units into the skin daily 3 mL 5    dofetilide (TIKOSYN) 250 MCG capsule Take 250 mcg by mouth 2 times daily       Current Facility-Administered Medications   Medication Dose Route Frequency Provider Last Rate Last Admin    0.9 % sodium chloride infusion   IntraVENous Once Raz Ho MD   Stopped at 09/16/21 1426    0.9 % sodium chloride infusion   IntraVENous Once Raz Ho MD   Stopped at 09/14/21 1229        Review of Systems   Constitutional:  Positive for chills and fatigue. HENT:  Positive for congestion, ear pain and sinus pressure. Respiratory:  Positive for cough. Neurological:  Positive for light-headedness and headaches. All other systems reviewed and are negative.      SpO2 98% Comment: ra     Physical Exam  Constitutional:       Appearance: Normal appearance. He is ill-appearing. HENT:      Head: Normocephalic and atraumatic. Right Ear: Tympanic membrane, ear canal and external ear normal.      Left Ear: Tympanic membrane, ear canal and external ear normal.      Nose: Congestion present. Mouth/Throat:      Mouth: Mucous membranes are moist.      Pharynx: Oropharynx is clear. Posterior oropharyngeal erythema present. Eyes:      Extraocular Movements: Extraocular movements intact. Conjunctiva/sclera: Conjunctivae normal.      Pupils: Pupils are equal, round, and reactive to light. Cardiovascular:      Rate and Rhythm: Normal rate and regular rhythm. Pulses: Normal pulses. Heart sounds: Normal heart sounds. Pulmonary:      Effort: Pulmonary effort is normal.      Breath sounds: Normal breath sounds. Abdominal:      General: Bowel sounds are normal.      Palpations: Abdomen is soft. Musculoskeletal:         General: Normal range of motion. Cervical back: Normal range of motion and neck supple. Skin:     General: Skin is warm and dry. Capillary Refill: Capillary refill takes less than 2 seconds. Neurological:      General: No focal deficit present. Mental Status: He is alert and oriented to person, place, and time. Psychiatric:         Mood and Affect: Mood normal.         Behavior: Behavior normal.        ASSESSMENT/PLAN    1. Acute bacterial sinusitis  Advised patient to take medication as directed. If his symptoms persist he should follow-up. He is agreeable to plan of care. - azithromycin (ZITHROMAX) 250 MG tablet; Take 1 tablet by mouth See Admin Instructions for 5 days 500mg on day 1 followed by 250mg on days 2 - 5  Dispense: 6 tablet; Refill: 0    2.  Body aches  COVID-negative  - POCT COVID-19 Rapid, NAAT             Jeanette Phan, APRN - CNP

## 2022-12-20 NOTE — PROGRESS NOTES
Chief Complaint   Patient presents with    Cough     Can't hardly breath at night    Congestion     Sick symptoms since Nov 7    Generalized Body Aches    Pharyngitis

## 2023-01-10 ENCOUNTER — OFFICE VISIT (OUTPATIENT)
Dept: FAMILY MEDICINE CLINIC | Age: 63
End: 2023-01-10
Payer: MEDICARE

## 2023-01-10 ENCOUNTER — HOSPITAL ENCOUNTER (OUTPATIENT)
Facility: HOSPITAL | Age: 63
Discharge: HOME | End: 2023-01-10
Payer: MEDICARE

## 2023-01-10 VITALS
TEMPERATURE: 97.9 F | HEART RATE: 99 BPM | OXYGEN SATURATION: 95 % | DIASTOLIC BLOOD PRESSURE: 70 MMHG | SYSTOLIC BLOOD PRESSURE: 130 MMHG | HEIGHT: 71 IN | RESPIRATION RATE: 18 BRPM | WEIGHT: 234 LBS | BODY MASS INDEX: 32.76 KG/M2

## 2023-01-10 DIAGNOSIS — I50.9 CONGESTIVE HEART FAILURE, UNSPECIFIED HF CHRONICITY, UNSPECIFIED HEART FAILURE TYPE (HCC): ICD-10-CM

## 2023-01-10 DIAGNOSIS — I10 HYPERTENSION, UNSPECIFIED TYPE: ICD-10-CM

## 2023-01-10 DIAGNOSIS — E11.8 TYPE 2 DIABETES MELLITUS WITH COMPLICATION (HCC): ICD-10-CM

## 2023-01-10 DIAGNOSIS — I48.91 ATRIAL FIBRILLATION, UNSPECIFIED TYPE (HCC): ICD-10-CM

## 2023-01-10 DIAGNOSIS — R06.02 SOB (SHORTNESS OF BREATH): ICD-10-CM

## 2023-01-10 DIAGNOSIS — I50.9 CONGESTIVE HEART FAILURE, UNSPECIFIED HF CHRONICITY, UNSPECIFIED HEART FAILURE TYPE (HCC): Primary | ICD-10-CM

## 2023-01-10 PROCEDURE — 80053 COMPREHEN METABOLIC PANEL: CPT

## 2023-01-10 PROCEDURE — 99214 OFFICE O/P EST MOD 30 MIN: CPT | Performed by: FAMILY MEDICINE

## 2023-01-10 PROCEDURE — 3074F SYST BP LT 130 MM HG: CPT | Performed by: FAMILY MEDICINE

## 2023-01-10 PROCEDURE — 3078F DIAST BP <80 MM HG: CPT | Performed by: FAMILY MEDICINE

## 2023-01-10 PROCEDURE — 85027 COMPLETE CBC AUTOMATED: CPT

## 2023-01-10 PROCEDURE — 83735 ASSAY OF MAGNESIUM: CPT

## 2023-01-10 PROCEDURE — 82977 ASSAY OF GGT: CPT

## 2023-01-10 PROCEDURE — 83036 HEMOGLOBIN GLYCOSYLATED A1C: CPT

## 2023-01-10 PROCEDURE — 3052F HG A1C>EQUAL 8.0%<EQUAL 9.0%: CPT | Performed by: FAMILY MEDICINE

## 2023-01-10 RX ORDER — ASPIRIN 81 MG/1
81 TABLET ORAL DAILY
COMMUNITY

## 2023-01-10 RX ORDER — DOFETILIDE 0.25 MG/1
250 CAPSULE ORAL 2 TIMES DAILY
Qty: 60 CAPSULE | Refills: 2 | Status: SHIPPED | OUTPATIENT
Start: 2023-01-10

## 2023-01-10 RX ORDER — ALBUTEROL SULFATE 2.5 MG/3ML
2.5 SOLUTION RESPIRATORY (INHALATION) EVERY 4 HOURS PRN
Qty: 25 EACH | Refills: 2 | Status: SHIPPED | OUTPATIENT
Start: 2023-01-10

## 2023-01-10 ASSESSMENT — PATIENT HEALTH QUESTIONNAIRE - PHQ9
SUM OF ALL RESPONSES TO PHQ QUESTIONS 1-9: 0
SUM OF ALL RESPONSES TO PHQ QUESTIONS 1-9: 0
2. FEELING DOWN, DEPRESSED OR HOPELESS: 0
SUM OF ALL RESPONSES TO PHQ9 QUESTIONS 1 & 2: 0
1. LITTLE INTEREST OR PLEASURE IN DOING THINGS: 0
SUM OF ALL RESPONSES TO PHQ QUESTIONS 1-9: 0
SUM OF ALL RESPONSES TO PHQ QUESTIONS 1-9: 0

## 2023-01-10 ASSESSMENT — ENCOUNTER SYMPTOMS: BACK PAIN: 1

## 2023-01-10 NOTE — PROGRESS NOTES
Chief Complaint   Patient presents with    Diabetes     Follow up/ has fluid retention    Cough    Medication Refill       Have you seen any other physician or provider since your last visit no    Have you had any other diagnostic tests since your last visit? no    Have you changed or stopped any medications since your last visit? no

## 2023-01-10 NOTE — PROGRESS NOTES
SUBJECTIVE:    Patient ID: Eva Palomo is a 65 y.o. male. Chief Complaint   Patient presents with    Diabetes     Follow up/ has fluid retention    Cough    Medication Refill       HPI: office visit  Is in the office today with his wife. She is here giving part of his history. His blood pressure seem to be doing pretty well. The swelling seems to be better. He says he is still having some episodes of shortness of breath. He is seen all of his medications as prescribed. He is even doing well with his blood sugars he says. He is really been trying hard to keep that under control. He is taking his medicines thinking about what he is eating. Says he is finally decided to do what he supposed to do about the blood sugars. He has not had any recent falls or injuries. He does have follow-up with cardiology. He is having some occasional palpitations though nothing severe. No significant rapid heart rates at home. He will having some issues with some anxiety and depression. Still having some significant back pain especially with the cold and damp weather we have been having. Review of Systems   Musculoskeletal:  Positive for back pain and gait problem. All other systems reviewed and are negative. OBJECTIVE:  /70   Pulse 99   Temp 97.9 °F (36.6 °C) (Infrared)   Resp 18   Ht 5' 11\" (1.803 m)   Wt 234 lb (106.1 kg)   SpO2 95% Comment: ra  BMI 32.64 kg/m²    Wt Readings from Last 3 Encounters:   01/10/23 234 lb (106.1 kg)   10/11/22 229 lb 12.8 oz (104.2 kg)   07/12/22 224 lb (101.6 kg)     BP Readings from Last 3 Encounters:   01/10/23 130/70   10/11/22 134/86   07/12/22 132/66      Pulse Readings from Last 3 Encounters:   01/10/23 99   10/11/22 (!) 111   07/12/22 82     Body mass index is 32.64 kg/m². Resp Readings from Last 3 Encounters:   01/10/23 18   10/11/22 15   07/12/22 18     Past medical, surgical, family and social history were reviewed and updated with the patient. Physical Exam  Vitals and nursing note reviewed. Constitutional:       Appearance: He is well-developed. HENT:      Head: Normocephalic and atraumatic. Right Ear: Hearing and external ear normal.      Left Ear: Hearing and external ear normal.      Nose: Nose normal.      Mouth/Throat:      Pharynx: Uvula midline. Eyes:      General: Lids are normal.      Conjunctiva/sclera: Conjunctivae normal.      Pupils: Pupils are equal, round, and reactive to light. Neck:      Thyroid: No thyroid mass or thyromegaly. Vascular: No carotid bruit. Trachea: Trachea and phonation normal.   Cardiovascular:      Rate and Rhythm: Normal rate and regular rhythm. Pulses: Normal pulses. Heart sounds: Normal heart sounds, S1 normal and S2 normal. No murmur heard. No friction rub. No gallop. Pulmonary:      Effort: Pulmonary effort is normal.      Breath sounds: Normal breath sounds. Musculoskeletal:      Cervical back: Normal range of motion and neck supple. Spasms and tenderness present. Lumbar back: Spasms and tenderness present. Decreased range of motion. Skin:     General: Skin is warm and dry. Neurological:      Mental Status: He is alert and oriented to person, place, and time. Mental status is at baseline. Psychiatric:         Attention and Perception: Attention and perception normal.         Mood and Affect: Mood and affect normal.         Speech: Speech normal.         Behavior: Behavior is cooperative. Thought Content:  Thought content normal.         Cognition and Memory: Cognition and memory normal.         Judgment: Judgment normal.        Results in Past 30 Days  Result Component Current Result Ref Range Previous Result Ref Range   Albumin/Globulin Ratio 1.1 (1/10/2023) 0.8 - 2.0 Not in Time Range    Albumin 4.0 (1/10/2023) 3.4 - 4.8 g/dL Not in Time Range    Alkaline Phosphatase 222 (H) (1/10/2023) 25 - 100 U/L Not in Time Range    ALT 29 (1/10/2023) 4 - 36 U/L Not in Time Range    AST 26 (1/10/2023) 8 - 33 U/L Not in Time Range    BUN 32 (H) (1/10/2023) 6 - 20 mg/dL Not in Time Range    Calcium 9.4 (1/10/2023) 8.5 - 10.5 mg/dL Not in Time Range    Chloride 100 (1/10/2023) 98 - 107 mmol/L Not in Time Range    CO2 27 (1/10/2023) 20 - 30 mmol/L Not in Time Range    Creatinine 1.9 (H) (1/10/2023) 0.4 - 1.2 mg/dL Not in Time Range    Est, Glom Filt Rate 39 (L) (1/10/2023) >59 Not in Time Range    Globulin 3.8 (1/10/2023) Not Established g/dL Not in Time Range    Glucose 132 (H) (1/10/2023) 74 - 106 mg/dL Not in Time Range    Potassium 5.3 (H) (1/10/2023) 3.4 - 5.1 mmol/L Not in Time Range    Sodium 138 (1/10/2023) 136 - 145 mmol/L Not in Time Range    Total Bilirubin 0.7 (1/10/2023) 0.3 - 1.2 mg/dL Not in Time Range    Total Protein 7.8 (1/10/2023) 6.4 - 8.3 g/dL Not in Time Range      Hemoglobin A1C (%)   Date Value   01/10/2023 8.5 (H)     Microalbumin, Random Urine (mg/dL)   Date Value   12/15/2021 62.40 (H)     LDL Calculated (mg/dL)   Date Value   01/12/2022 44       Lab Results   Component Value Date/Time    WBC 11.3 01/10/2023 06:16 PM    NEUTROABS 7.0 12/10/2020 01:13 PM    HGB 16.0 01/10/2023 06:16 PM    HCT 50.7 01/10/2023 06:16 PM    MCV 84.8 01/10/2023 06:16 PM     01/10/2023 06:16 PM     Lab Results   Component Value Date    TSH 2.75 04/12/2022       ASSESSMENT/PLAN    Diagnosis Orders   1. Congestive heart failure, unspecified HF chronicity, unspecified heart failure type ([address]  CBC    EKG 12 Lead - Clinic Performed      2. Type 2 diabetes mellitus with complication (HCC)  Comprehensive Metabolic Panel    Hemoglobin A1C      3. Hypertension, unspecified type  metoprolol tartrate (LOPRESSOR) 25 MG tablet      4. Atrial fibrillation, unspecified type ([facility])  metoprolol tartrate (LOPRESSOR) 25 MG tablet    aspirin 81 MG EC tablet    dofetilide (TIKOSYN) 250 MCG capsule      5.  SOB (shortness of breath)  albuterol (PROVENTIL) (2.5 MG/3ML) 0.083% nebulizer solution          Orders Placed This Encounter   Medications    albuterol (PROVENTIL) (2.5 MG/3ML) 0.083% nebulizer solution     Sig: Take 3 mLs by nebulization every 4 hours as needed for Wheezing     Dispense:  25 each     Refill:  2    dofetilide (TIKOSYN) 250 MCG capsule     Sig: Take 1 capsule by mouth 2 times daily     Dispense:  60 capsule     Refill:  2        Medications Discontinued During This Encounter   Medication Reason    metoprolol tartrate (LOPRESSOR) 50 MG tablet Therapy completed    dofetilide (TIKOSYN) 250 MCG capsule REORDER    albuterol (PROVENTIL) (2.5 MG/3ML) 0.083% nebulizer solution REORDER       Controlled Substances Monitoring:      Please note: This chart was generated using Dragon dictation software. Although every effort was made to ensure the accuracy of this automated transcription, some errors in transcription may have occurred.

## 2023-01-11 LAB
A/G RATIO: 1.1 (ref 0.8–2)
ALBUMIN SERPL-MCNC: 4 G/DL (ref 3.4–4.8)
ALP BLD-CCNC: 222 U/L (ref 25–100)
ALT SERPL-CCNC: 29 U/L (ref 4–36)
ANION GAP SERPL CALCULATED.3IONS-SCNC: 11 MMOL/L (ref 3–16)
AST SERPL-CCNC: 26 U/L (ref 8–33)
BILIRUB SERPL-MCNC: 0.7 MG/DL (ref 0.3–1.2)
BUN BLDV-MCNC: 32 MG/DL (ref 6–20)
CALCIUM SERPL-MCNC: 9.4 MG/DL (ref 8.5–10.5)
CHLORIDE BLD-SCNC: 100 MMOL/L (ref 98–107)
CO2: 27 MMOL/L (ref 20–30)
CREAT SERPL-MCNC: 1.9 MG/DL (ref 0.4–1.2)
GAMMA GLUTAMYL TRANSFERASE: 238 U/L (ref 8–61)
GFR SERPL CREATININE-BSD FRML MDRD: 39 ML/MIN/{1.73_M2}
GLOBULIN: 3.8 G/DL
GLUCOSE BLD-MCNC: 132 MG/DL (ref 74–106)
HBA1C MFR BLD: 8.5 %
HCT VFR BLD CALC: 50.7 % (ref 40–54)
HEMOGLOBIN: 16 G/DL (ref 13–18)
MAGNESIUM: 1.8 MG/DL (ref 1.7–2.4)
MCH RBC QN AUTO: 26.8 PG (ref 27–32)
MCHC RBC AUTO-ENTMCNC: 31.6 G/DL (ref 31–35)
MCV RBC AUTO: 84.8 FL (ref 80–100)
PDW BLD-RTO: 17.2 % (ref 11–16)
PLATELET # BLD: 174 K/UL (ref 150–400)
PMV BLD AUTO: 11.1 FL (ref 6–10)
POTASSIUM SERPL-SCNC: 5.3 MMOL/L (ref 3.4–5.1)
RBC # BLD: 5.98 M/UL (ref 4.5–6)
SODIUM BLD-SCNC: 138 MMOL/L (ref 136–145)
TOTAL PROTEIN: 7.8 G/DL (ref 6.4–8.3)
WBC # BLD: 11.3 K/UL (ref 4–11)

## 2023-01-25 ENCOUNTER — OFFICE VISIT (OUTPATIENT)
Dept: FAMILY MEDICINE CLINIC | Age: 63
End: 2023-01-25
Payer: MEDICARE

## 2023-01-25 VITALS
WEIGHT: 226 LBS | BODY MASS INDEX: 31.64 KG/M2 | RESPIRATION RATE: 14 BRPM | HEIGHT: 71 IN | HEART RATE: 110 BPM | DIASTOLIC BLOOD PRESSURE: 80 MMHG | OXYGEN SATURATION: 96 % | TEMPERATURE: 97.6 F | SYSTOLIC BLOOD PRESSURE: 138 MMHG

## 2023-01-25 DIAGNOSIS — I48.91 ATRIAL FIBRILLATION, UNSPECIFIED TYPE (HCC): ICD-10-CM

## 2023-01-25 DIAGNOSIS — M54.42 CHRONIC MIDLINE LOW BACK PAIN WITH BILATERAL SCIATICA: ICD-10-CM

## 2023-01-25 DIAGNOSIS — E11.8 TYPE 2 DIABETES MELLITUS WITH COMPLICATION (HCC): Primary | ICD-10-CM

## 2023-01-25 DIAGNOSIS — M19.90 ARTHRITIS: ICD-10-CM

## 2023-01-25 DIAGNOSIS — M54.41 CHRONIC MIDLINE LOW BACK PAIN WITH BILATERAL SCIATICA: ICD-10-CM

## 2023-01-25 DIAGNOSIS — G89.29 CHRONIC MIDLINE LOW BACK PAIN WITH BILATERAL SCIATICA: ICD-10-CM

## 2023-01-25 DIAGNOSIS — I10 HYPERTENSION, UNSPECIFIED TYPE: ICD-10-CM

## 2023-01-25 DIAGNOSIS — I10 ESSENTIAL HYPERTENSION: ICD-10-CM

## 2023-01-25 PROCEDURE — 3074F SYST BP LT 130 MM HG: CPT | Performed by: FAMILY MEDICINE

## 2023-01-25 PROCEDURE — 3052F HG A1C>EQUAL 8.0%<EQUAL 9.0%: CPT | Performed by: FAMILY MEDICINE

## 2023-01-25 PROCEDURE — 3078F DIAST BP <80 MM HG: CPT | Performed by: FAMILY MEDICINE

## 2023-01-25 PROCEDURE — 99213 OFFICE O/P EST LOW 20 MIN: CPT | Performed by: FAMILY MEDICINE

## 2023-01-25 ASSESSMENT — ENCOUNTER SYMPTOMS: BACK PAIN: 1

## 2023-01-25 NOTE — PROGRESS NOTES
Chief Complaint   Patient presents with    Diabetes       Have you seen any other physician or provider since your last visit no    Have you had any other diagnostic tests since your last visit? no    Have you changed or stopped any medications since your last visit? no     I have recommended that this patient have a sigmoidoscopy but he due to refusal reason: no symptoms  I have discussed the risks and benefits of this examination with him. The patient verbalizes understanding. Provider will be informed of refusal.      Diabetic retinal exam completed this year?  No                       * If yes please have patient sign a records release to obtain record to update Health Maintenance                       * If no, please order referral for patient to be scheduled  65year old here for follow up diabetes

## 2023-01-25 NOTE — PROGRESS NOTES
SUBJECTIVE:    Patient ID: Cindi Rivers is a 65 y.o. male. Chief Complaint   Patient presents with    Diabetes       HPI: office visit  He is in the office today to follow-up on his diabetes. He is doing quite a bit better with his blood sugar readings. He has been really watching his diet quite a bit more. Has been taking his medications as prescribed. He is had good blood pressure readings. He is not having any chest pain. He denies any shortness of breath. He still having quite a bit of back pain. He still having quite a bit of arthritic symptoms as he is getting older. He has not had any falls or injuries. He is still having some shortness of breath. He still having some weakness. Review of Systems   Musculoskeletal:  Positive for back pain and gait problem. All other systems reviewed and are negative. OBJECTIVE:  /80 (Site: Left Upper Arm, Position: Sitting, Cuff Size: Medium Adult)   Pulse (!) 110   Temp 97.6 °F (36.4 °C) (Temporal)   Resp 14   Ht 5' 11\" (1.803 m)   Wt 226 lb (102.5 kg)   SpO2 96%   BMI 31.52 kg/m²    Wt Readings from Last 3 Encounters:   01/25/23 226 lb (102.5 kg)   01/10/23 234 lb (106.1 kg)   10/11/22 229 lb 12.8 oz (104.2 kg)     BP Readings from Last 3 Encounters:   01/25/23 138/80   01/10/23 130/70   10/11/22 134/86      Pulse Readings from Last 3 Encounters:   01/25/23 (!) 110   01/10/23 99   10/11/22 (!) 111     Body mass index is 31.52 kg/m². Resp Readings from Last 3 Encounters:   01/25/23 14   01/10/23 18   10/11/22 15     Past medical, surgical, family and social history were reviewed and updated with the patient. Physical Exam  Vitals and nursing note reviewed. Constitutional:       Appearance: He is well-developed. HENT:      Head: Normocephalic and atraumatic. Right Ear: Hearing and external ear normal.      Left Ear: Hearing and external ear normal.      Nose: Nose normal.      Mouth/Throat:      Pharynx: Uvula midline.    Eyes: General: Lids are normal.      Conjunctiva/sclera: Conjunctivae normal.      Pupils: Pupils are equal, round, and reactive to light. Neck:      Thyroid: No thyroid mass or thyromegaly. Vascular: No carotid bruit. Trachea: Trachea and phonation normal.   Cardiovascular:      Rate and Rhythm: Normal rate and regular rhythm. Pulses: Normal pulses. Heart sounds: Normal heart sounds, S1 normal and S2 normal. No murmur heard. No friction rub. No gallop. Pulmonary:      Effort: Pulmonary effort is normal.      Breath sounds: Normal breath sounds. Musculoskeletal:      Cervical back: Normal range of motion and neck supple. Spasms and tenderness present. Lumbar back: Spasms and tenderness present. Decreased range of motion. Skin:     General: Skin is warm and dry. Neurological:      Mental Status: He is alert and oriented to person, place, and time. Mental status is at baseline. Psychiatric:         Attention and Perception: Attention and perception normal.         Mood and Affect: Mood and affect normal.         Speech: Speech normal.         Behavior: Behavior is cooperative. Thought Content:  Thought content normal.         Cognition and Memory: Cognition and memory normal.         Judgment: Judgment normal.        Results in Past 30 Days  Result Component Current Result Ref Range Previous Result Ref Range   Albumin/Globulin Ratio 1.1 (1/10/2023) 0.8 - 2.0 Not in Time Range    Albumin 4.0 (1/10/2023) 3.4 - 4.8 g/dL Not in Time Range    Alkaline Phosphatase 222 (H) (1/10/2023) 25 - 100 U/L Not in Time Range    ALT 29 (1/10/2023) 4 - 36 U/L Not in Time Range    AST 26 (1/10/2023) 8 - 33 U/L Not in Time Range    BUN 32 (H) (1/10/2023) 6 - 20 mg/dL Not in Time Range    Calcium 9.4 (1/10/2023) 8.5 - 10.5 mg/dL Not in Time Range    Chloride 100 (1/10/2023) 98 - 107 mmol/L Not in Time Range    CO2 27 (1/10/2023) 20 - 30 mmol/L Not in Time Range    Creatinine 1.9 (H) (1/10/2023) 0.4 - 1.2 mg/dL Not in Time Range    Est, Glom Filt Rate 39 (L) (1/10/2023) >59 Not in Time Range    Globulin 3.8 (1/10/2023) Not Established g/dL Not in Time Range    Glucose 132 (H) (1/10/2023) 74 - 106 mg/dL Not in Time Range    Potassium 5.3 (H) (1/10/2023) 3.4 - 5.1 mmol/L Not in Time Range    Sodium 138 (1/10/2023) 136 - 145 mmol/L Not in Time Range    Total Bilirubin 0.7 (1/10/2023) 0.3 - 1.2 mg/dL Not in Time Range    Total Protein 7.8 (1/10/2023) 6.4 - 8.3 g/dL Not in Time Range      Hemoglobin A1C (%)   Date Value   01/10/2023 8.5 (H)     Microalbumin, Random Urine (mg/dL)   Date Value   12/15/2021 62.40 (H)     LDL Calculated (mg/dL)   Date Value   01/12/2022 44       Lab Results   Component Value Date/Time    WBC 11.3 01/10/2023 06:16 PM    NEUTROABS 7.0 12/10/2020 01:13 PM    HGB 16.0 01/10/2023 06:16 PM    HCT 50.7 01/10/2023 06:16 PM    MCV 84.8 01/10/2023 06:16 PM     01/10/2023 06:16 PM     Lab Results   Component Value Date    TSH 2.75 04/12/2022       ASSESSMENT/PLAN    Diagnosis Orders   1. Type 2 diabetes mellitus with complication (HCC)  blood glucose monitor strips    Lancets MISC      2. Arthritis  oxyCODONE-acetaminophen (PERCOCET)  MG per tablet      3. Hypertension, unspecified type        4. Atrial fibrillation, unspecified type ([address]        5. Essential hypertension        6. Chronic midline low back pain with bilateral sciatica  oxyCODONE-acetaminophen (PERCOCET)  MG per tablet          Orders Placed This Encounter   Medications    oxyCODONE-acetaminophen (PERCOCET)  MG per tablet     Sig: Take 1 tablet by mouth every 6 hours as needed for Pain for up to 30 days. Dispense:  90 tablet     Refill:  0     Reduce doses taken as pain becomes manageable    blood glucose monitor strips     Sig: Test 100 times a day & as needed for symptoms of irregular blood glucose.  Dispense sufficient amount for indicated testing frequency plus additional to accommodate PRN testing needs. Dispense:  100 strip     Refill:  5     Brand per patient preference. May round up to next available package size. Lancets MISC     Si each by Does not apply route daily     Dispense:  100 each     Refill:  5          Medications Discontinued During This Encounter   Medication Reason    oxyCODONE-acetaminophen (PERCOCET)  MG per tablet REORDER       Controlled Substances Monitoring:      Please note: This chart was generated using Dragon dictation software. Although every effort was made to ensure the accuracy of this automated transcription, some errors in transcription may have occurred.

## 2023-01-29 RX ORDER — LANCETS 30 GAUGE
1 EACH MISCELLANEOUS DAILY
Qty: 100 EACH | Refills: 5 | Status: SHIPPED | OUTPATIENT
Start: 2023-01-29

## 2023-01-29 RX ORDER — GLUCOSAMINE HCL/CHONDROITIN SU 500-400 MG
CAPSULE ORAL
Qty: 100 STRIP | Refills: 5 | Status: SHIPPED | OUTPATIENT
Start: 2023-01-29

## 2023-01-29 RX ORDER — OXYCODONE AND ACETAMINOPHEN 10; 325 MG/1; MG/1
1 TABLET ORAL EVERY 6 HOURS PRN
Qty: 90 TABLET | Refills: 0 | Status: SHIPPED | OUTPATIENT
Start: 2023-01-29 | End: 2023-02-28

## 2023-01-30 ENCOUNTER — TELEPHONE (OUTPATIENT)
Dept: FAMILY MEDICINE CLINIC | Age: 63
End: 2023-01-30

## 2023-01-30 DIAGNOSIS — E11.8 TYPE 2 DIABETES MELLITUS WITH COMPLICATION (HCC): Primary | ICD-10-CM

## 2023-02-02 DIAGNOSIS — I50.9 CONGESTIVE HEART FAILURE, UNSPECIFIED HF CHRONICITY, UNSPECIFIED HEART FAILURE TYPE (HCC): ICD-10-CM

## 2023-02-02 PROCEDURE — 93000 ELECTROCARDIOGRAM COMPLETE: CPT | Performed by: FAMILY MEDICINE

## 2023-04-11 ENCOUNTER — OFFICE VISIT (OUTPATIENT)
Dept: FAMILY MEDICINE CLINIC | Age: 63
End: 2023-04-11
Payer: MEDICARE

## 2023-04-11 VITALS
RESPIRATION RATE: 18 BRPM | HEART RATE: 66 BPM | DIASTOLIC BLOOD PRESSURE: 70 MMHG | HEIGHT: 71 IN | OXYGEN SATURATION: 96 % | WEIGHT: 229 LBS | BODY MASS INDEX: 32.06 KG/M2 | SYSTOLIC BLOOD PRESSURE: 122 MMHG | TEMPERATURE: 98 F

## 2023-04-11 DIAGNOSIS — E11.42 DIABETIC POLYNEUROPATHY ASSOCIATED WITH TYPE 2 DIABETES MELLITUS (HCC): ICD-10-CM

## 2023-04-11 DIAGNOSIS — M54.41 CHRONIC MIDLINE LOW BACK PAIN WITH BILATERAL SCIATICA: ICD-10-CM

## 2023-04-11 DIAGNOSIS — I48.91 ATRIAL FIBRILLATION, UNSPECIFIED TYPE (HCC): ICD-10-CM

## 2023-04-11 DIAGNOSIS — I10 ESSENTIAL HYPERTENSION: Primary | ICD-10-CM

## 2023-04-11 DIAGNOSIS — M54.42 CHRONIC MIDLINE LOW BACK PAIN WITH BILATERAL SCIATICA: ICD-10-CM

## 2023-04-11 DIAGNOSIS — R53.83 FATIGUE, UNSPECIFIED TYPE: ICD-10-CM

## 2023-04-11 DIAGNOSIS — F41.9 ANXIETY: ICD-10-CM

## 2023-04-11 DIAGNOSIS — M19.90 ARTHRITIS: ICD-10-CM

## 2023-04-11 DIAGNOSIS — G89.29 CHRONIC MIDLINE LOW BACK PAIN WITH BILATERAL SCIATICA: ICD-10-CM

## 2023-04-11 PROCEDURE — 3074F SYST BP LT 130 MM HG: CPT | Performed by: FAMILY MEDICINE

## 2023-04-11 PROCEDURE — 99214 OFFICE O/P EST MOD 30 MIN: CPT | Performed by: FAMILY MEDICINE

## 2023-04-11 PROCEDURE — 3078F DIAST BP <80 MM HG: CPT | Performed by: FAMILY MEDICINE

## 2023-04-11 PROCEDURE — 3046F HEMOGLOBIN A1C LEVEL >9.0%: CPT | Performed by: FAMILY MEDICINE

## 2023-04-11 RX ORDER — DOFETILIDE 0.25 MG/1
250 CAPSULE ORAL 2 TIMES DAILY
Qty: 60 CAPSULE | Refills: 2 | Status: SHIPPED | OUTPATIENT
Start: 2023-04-11

## 2023-04-11 RX ORDER — TORSEMIDE 20 MG/1
20 TABLET ORAL DAILY
COMMUNITY
End: 2023-04-11 | Stop reason: SDUPTHER

## 2023-04-11 RX ORDER — TRIAMCINOLONE ACETONIDE 0.25 MG/G
CREAM TOPICAL
Qty: 80 G | Refills: 2 | Status: SHIPPED | OUTPATIENT
Start: 2023-04-11

## 2023-04-11 RX ORDER — PREGABALIN 50 MG/1
50 CAPSULE ORAL 3 TIMES DAILY
Qty: 270 CAPSULE | Refills: 1 | Status: CANCELLED | OUTPATIENT
Start: 2023-04-11 | End: 2023-05-11

## 2023-04-11 RX ORDER — APIXABAN 2.5 MG/1
2.5 TABLET, FILM COATED ORAL 2 TIMES DAILY
Qty: 180 TABLET | Refills: 3 | Status: SHIPPED | OUTPATIENT
Start: 2023-04-11

## 2023-04-11 RX ORDER — LEVOTHYROXINE SODIUM 88 UG/1
TABLET ORAL
Qty: 90 TABLET | Refills: 3 | Status: SHIPPED | OUTPATIENT
Start: 2023-04-11 | End: 2023-04-15

## 2023-04-11 RX ORDER — TRAZODONE HYDROCHLORIDE 150 MG/1
TABLET ORAL
Qty: 90 TABLET | Refills: 3 | Status: SHIPPED | OUTPATIENT
Start: 2023-04-11

## 2023-04-11 RX ORDER — TORSEMIDE 20 MG/1
20 TABLET ORAL DAILY
Qty: 30 TABLET | Refills: 2 | Status: SHIPPED | OUTPATIENT
Start: 2023-04-11

## 2023-04-11 RX ORDER — PRAVASTATIN SODIUM 40 MG
TABLET ORAL
Qty: 90 TABLET | Refills: 3 | Status: SHIPPED | OUTPATIENT
Start: 2023-04-11

## 2023-04-11 RX ORDER — PANTOPRAZOLE SODIUM 40 MG/1
40 TABLET, DELAYED RELEASE ORAL
Qty: 90 TABLET | Refills: 3 | Status: SHIPPED | OUTPATIENT
Start: 2023-04-11

## 2023-04-11 RX ORDER — OXYCODONE AND ACETAMINOPHEN 10; 325 MG/1; MG/1
1 TABLET ORAL EVERY 6 HOURS PRN
Qty: 90 TABLET | Refills: 0 | Status: CANCELLED | OUTPATIENT
Start: 2023-04-11 | End: 2023-05-11

## 2023-04-11 SDOH — ECONOMIC STABILITY: HOUSING INSECURITY
IN THE LAST 12 MONTHS, WAS THERE A TIME WHEN YOU DID NOT HAVE A STEADY PLACE TO SLEEP OR SLEPT IN A SHELTER (INCLUDING NOW)?: NO

## 2023-04-11 SDOH — ECONOMIC STABILITY: INCOME INSECURITY: HOW HARD IS IT FOR YOU TO PAY FOR THE VERY BASICS LIKE FOOD, HOUSING, MEDICAL CARE, AND HEATING?: SOMEWHAT HARD

## 2023-04-11 SDOH — ECONOMIC STABILITY: FOOD INSECURITY: WITHIN THE PAST 12 MONTHS, THE FOOD YOU BOUGHT JUST DIDN'T LAST AND YOU DIDN'T HAVE MONEY TO GET MORE.: NEVER TRUE

## 2023-04-11 SDOH — ECONOMIC STABILITY: FOOD INSECURITY: WITHIN THE PAST 12 MONTHS, YOU WORRIED THAT YOUR FOOD WOULD RUN OUT BEFORE YOU GOT MONEY TO BUY MORE.: NEVER TRUE

## 2023-04-11 ASSESSMENT — ENCOUNTER SYMPTOMS: BACK PAIN: 1

## 2023-05-03 ENCOUNTER — OFFICE VISIT (OUTPATIENT)
Dept: FAMILY MEDICINE CLINIC | Age: 63
End: 2023-05-03
Payer: MEDICARE

## 2023-05-03 ENCOUNTER — HOSPITAL ENCOUNTER (OUTPATIENT)
Facility: HOSPITAL | Age: 63
Discharge: HOME | End: 2023-05-03
Payer: MEDICARE

## 2023-05-03 VITALS
TEMPERATURE: 97.2 F | DIASTOLIC BLOOD PRESSURE: 68 MMHG | HEIGHT: 71 IN | RESPIRATION RATE: 18 BRPM | BODY MASS INDEX: 32.62 KG/M2 | WEIGHT: 233 LBS | OXYGEN SATURATION: 96 % | SYSTOLIC BLOOD PRESSURE: 122 MMHG | HEART RATE: 79 BPM

## 2023-05-03 DIAGNOSIS — I48.91 ATRIAL FIBRILLATION, UNSPECIFIED TYPE (HCC): ICD-10-CM

## 2023-05-03 DIAGNOSIS — E03.9 HYPOTHYROIDISM, UNSPECIFIED TYPE: ICD-10-CM

## 2023-05-03 DIAGNOSIS — I10 ESSENTIAL HYPERTENSION: ICD-10-CM

## 2023-05-03 DIAGNOSIS — E11.8 TYPE 2 DIABETES MELLITUS WITH COMPLICATION (HCC): ICD-10-CM

## 2023-05-03 DIAGNOSIS — I50.9 ACUTE ON CHRONIC CONGESTIVE HEART FAILURE, UNSPECIFIED HEART FAILURE TYPE (HCC): ICD-10-CM

## 2023-05-03 DIAGNOSIS — R07.9 CHEST PAIN, UNSPECIFIED TYPE: Primary | ICD-10-CM

## 2023-05-03 DIAGNOSIS — E11.42 DIABETIC POLYNEUROPATHY ASSOCIATED WITH TYPE 2 DIABETES MELLITUS (HCC): ICD-10-CM

## 2023-05-03 DIAGNOSIS — F41.9 ANXIETY: ICD-10-CM

## 2023-05-03 LAB
ALBUMIN SERPL-MCNC: 3.5 G/DL (ref 3.4–4.8)
ALBUMIN/GLOB SERPL: 1.1 {RATIO} (ref 0.8–2)
ALP SERPL-CCNC: 206 U/L (ref 25–100)
ALT SERPL-CCNC: 32 U/L (ref 4–36)
ANION GAP SERPL CALCULATED.3IONS-SCNC: 10 MMOL/L (ref 3–16)
AST SERPL-CCNC: 25 U/L (ref 8–33)
BILIRUB SERPL-MCNC: 0.7 MG/DL (ref 0.3–1.2)
BUN SERPL-MCNC: 39 MG/DL (ref 6–20)
CALCIUM SERPL-MCNC: 9.6 MG/DL (ref 8.5–10.5)
CHLORIDE SERPL-SCNC: 100 MMOL/L (ref 98–107)
CO2 SERPL-SCNC: 28 MMOL/L (ref 20–30)
CREAT SERPL-MCNC: 2.1 MG/DL (ref 0.4–1.2)
ERYTHROCYTE [DISTWIDTH] IN BLOOD BY AUTOMATED COUNT: 17.2 % (ref 11–16)
GFR SERPLBLD CREATININE-BSD FMLA CKD-EPI: 35 ML/MIN/{1.73_M2}
GLOBULIN SER CALC-MCNC: 3.1 G/DL
GLUCOSE SERPL-MCNC: 228 MG/DL (ref 74–106)
HCT VFR BLD AUTO: 44.9 % (ref 40–54)
HGB BLD-MCNC: 14.9 G/DL (ref 13–18)
MCH RBC QN AUTO: 28.1 PG (ref 27–32)
MCHC RBC AUTO-ENTMCNC: 33.2 G/DL (ref 31–35)
MCV RBC AUTO: 84.7 FL (ref 80–100)
PLATELET # BLD AUTO: 115 K/UL (ref 150–400)
PMV BLD AUTO: 9.7 FL (ref 6–10)
POTASSIUM SERPL-SCNC: 5.7 MMOL/L (ref 3.4–5.1)
PROT SERPL-MCNC: 6.6 G/DL (ref 6.4–8.3)
RBC # BLD AUTO: 5.3 M/UL (ref 4.5–6)
SODIUM SERPL-SCNC: 138 MMOL/L (ref 136–145)
WBC # BLD AUTO: 8 K/UL (ref 4–11)

## 2023-05-03 PROCEDURE — 80053 COMPREHEN METABOLIC PANEL: CPT

## 2023-05-03 PROCEDURE — 3074F SYST BP LT 130 MM HG: CPT | Performed by: FAMILY MEDICINE

## 2023-05-03 PROCEDURE — 3046F HEMOGLOBIN A1C LEVEL >9.0%: CPT | Performed by: FAMILY MEDICINE

## 2023-05-03 PROCEDURE — 85027 COMPLETE CBC AUTOMATED: CPT

## 2023-05-03 PROCEDURE — 99214 OFFICE O/P EST MOD 30 MIN: CPT | Performed by: FAMILY MEDICINE

## 2023-05-03 PROCEDURE — 3078F DIAST BP <80 MM HG: CPT | Performed by: FAMILY MEDICINE

## 2023-05-03 RX ORDER — LEVOTHYROXINE SODIUM 0.1 MG/1
100 TABLET ORAL DAILY
Qty: 90 TABLET | Refills: 3 | Status: SHIPPED | OUTPATIENT
Start: 2023-05-03

## 2023-05-03 RX ORDER — DOFETILIDE 0.25 MG/1
250 CAPSULE ORAL 2 TIMES DAILY
Qty: 180 CAPSULE | Refills: 3 | Status: SHIPPED | OUTPATIENT
Start: 2023-05-03

## 2023-05-03 RX ORDER — CLONAZEPAM 1 MG/1
1 TABLET ORAL 2 TIMES DAILY PRN
Qty: 60 TABLET | Refills: 2 | Status: CANCELLED | OUTPATIENT
Start: 2023-05-03 | End: 2023-06-02

## 2023-05-03 RX ORDER — PREGABALIN 50 MG/1
50 CAPSULE ORAL 3 TIMES DAILY
Qty: 270 CAPSULE | Refills: 1 | Status: SHIPPED | OUTPATIENT
Start: 2023-05-03 | End: 2023-06-02

## 2023-05-03 RX ORDER — SPIRONOLACTONE 25 MG/1
25 TABLET ORAL DAILY
Qty: 30 TABLET | Refills: 3 | Status: SHIPPED | OUTPATIENT
Start: 2023-05-03

## 2023-05-03 RX ORDER — SODIUM POLYSTYRENE SULFONATE 15 G/60ML
30 SUSPENSION ORAL; RECTAL ONCE
Qty: 120 ML | Refills: 0 | Status: SHIPPED | OUTPATIENT
Start: 2023-05-03 | End: 2023-05-03

## 2023-05-03 ASSESSMENT — ENCOUNTER SYMPTOMS: BACK PAIN: 1

## 2023-05-03 NOTE — PROGRESS NOTES
Chief Complaint   Patient presents with    Diabetes    Shortness of Breath     CHF       Have you seen any other physician or provider since your last visit no    Have you had any other diagnostic tests since your last visit? no    Have you changed or stopped any medications since your last visit? no

## 2023-05-03 NOTE — PROGRESS NOTES
SUBJECTIVE:    Patient ID: Ady Barnes is a 59 y.o. male. Chief Complaint   Patient presents with    Diabetes    Shortness of Breath     CHF       HPI: office visit  Is in the office today with his wife. He still having some swelling. He says it did get quite a bit better. For about 2 weeks the swelling was much better. He got back on the to Tikosyn. He was taking his fluid medicine. He was urinating relatively well. He says that he is in about the last week started to regain the fluid. He says he is having more shortness of breath. Has had more weakness. His heart rate does seem like it has stayed better on the [place] and. He is not having so much rapid heart rate. He is occasionally having some palpitations. He says it seems to come and go. He has not been taking his insulin like he should. He has not been checking his blood sugars like he should. He does not think his blood pressures have been bad though has not really been checking his heart rate. He says he definitely does feel weak. He is having some increase in shortness of breath. Review of Systems   Musculoskeletal:  Positive for back pain and gait problem. All other systems reviewed and are negative. OBJECTIVE:  /68   Pulse 79   Temp 97.2 °F (36.2 °C)   Resp 18   Ht 5' 11\" (1.803 m)   Wt 233 lb (105.7 kg)   SpO2 96% Comment: ra  BMI 32.50 kg/m²    Wt Readings from Last 3 Encounters:   05/03/23 233 lb (105.7 kg)   04/11/23 229 lb (103.9 kg)   01/25/23 226 lb (102.5 kg)     BP Readings from Last 3 Encounters:   05/03/23 122/68   04/11/23 122/70   01/25/23 138/80      Pulse Readings from Last 3 Encounters:   05/03/23 79   04/11/23 66   01/25/23 (!) 110     Body mass index is 32.5 kg/m². Resp Readings from Last 3 Encounters:   05/03/23 18   04/11/23 18   01/25/23 14     Past medical, surgical, family and social history were reviewed and updated with the patient.      Physical Exam  Vitals and nursing note

## 2023-05-09 ENCOUNTER — HOSPITAL ENCOUNTER (OUTPATIENT)
Facility: HOSPITAL | Age: 63
Discharge: HOME | End: 2023-05-09
Payer: MEDICARE

## 2023-05-09 ENCOUNTER — OFFICE VISIT (OUTPATIENT)
Dept: FAMILY MEDICINE CLINIC | Age: 63
End: 2023-05-09
Payer: MEDICARE

## 2023-05-09 VITALS
WEIGHT: 225 LBS | DIASTOLIC BLOOD PRESSURE: 78 MMHG | OXYGEN SATURATION: 98 % | TEMPERATURE: 97.9 F | SYSTOLIC BLOOD PRESSURE: 138 MMHG | RESPIRATION RATE: 18 BRPM | BODY MASS INDEX: 31.5 KG/M2 | HEIGHT: 71 IN | HEART RATE: 84 BPM

## 2023-05-09 DIAGNOSIS — I10 ESSENTIAL HYPERTENSION: ICD-10-CM

## 2023-05-09 DIAGNOSIS — E87.5 HYPERKALEMIA: ICD-10-CM

## 2023-05-09 DIAGNOSIS — E87.5 HYPERKALEMIA: Primary | ICD-10-CM

## 2023-05-09 DIAGNOSIS — E11.8 TYPE 2 DIABETES MELLITUS WITH COMPLICATION (HCC): ICD-10-CM

## 2023-05-09 DIAGNOSIS — I48.91 ATRIAL FIBRILLATION, UNSPECIFIED TYPE (HCC): ICD-10-CM

## 2023-05-09 PROCEDURE — 3074F SYST BP LT 130 MM HG: CPT | Performed by: FAMILY MEDICINE

## 2023-05-09 PROCEDURE — 80053 COMPREHEN METABOLIC PANEL: CPT

## 2023-05-09 PROCEDURE — 99213 OFFICE O/P EST LOW 20 MIN: CPT | Performed by: FAMILY MEDICINE

## 2023-05-09 PROCEDURE — 36415 COLL VENOUS BLD VENIPUNCTURE: CPT

## 2023-05-09 PROCEDURE — 3078F DIAST BP <80 MM HG: CPT | Performed by: FAMILY MEDICINE

## 2023-05-09 PROCEDURE — 3046F HEMOGLOBIN A1C LEVEL >9.0%: CPT | Performed by: FAMILY MEDICINE

## 2023-05-10 LAB
ALBUMIN SERPL-MCNC: 3.9 G/DL (ref 3.4–4.8)
ALBUMIN/GLOB SERPL: 1 {RATIO} (ref 0.8–2)
ALP SERPL-CCNC: 248 U/L (ref 25–100)
ALT SERPL-CCNC: 32 U/L (ref 4–36)
ANION GAP SERPL CALCULATED.3IONS-SCNC: 14 MMOL/L (ref 3–16)
AST SERPL-CCNC: 37 U/L (ref 8–33)
BILIRUB SERPL-MCNC: 0.8 MG/DL (ref 0.3–1.2)
BUN SERPL-MCNC: 39 MG/DL (ref 6–20)
CALCIUM SERPL-MCNC: 9.5 MG/DL (ref 8.5–10.5)
CHLORIDE SERPL-SCNC: 95 MMOL/L (ref 98–107)
CO2 SERPL-SCNC: 23 MMOL/L (ref 20–30)
CREAT SERPL-MCNC: 1.9 MG/DL (ref 0.4–1.2)
GFR SERPLBLD CREATININE-BSD FMLA CKD-EPI: 39 ML/MIN/{1.73_M2}
GLOBULIN SER CALC-MCNC: 3.8 G/DL
GLUCOSE SERPL-MCNC: 194 MG/DL (ref 74–106)
POTASSIUM SERPL-SCNC: 4.9 MMOL/L (ref 3.4–5.1)
PROT SERPL-MCNC: 7.7 G/DL (ref 6.4–8.3)
SODIUM SERPL-SCNC: 132 MMOL/L (ref 136–145)

## 2023-05-19 ASSESSMENT — ENCOUNTER SYMPTOMS: BACK PAIN: 1

## 2023-06-06 ENCOUNTER — OFFICE VISIT (OUTPATIENT)
Dept: FAMILY MEDICINE CLINIC | Age: 63
End: 2023-06-06
Payer: MEDICARE

## 2023-06-06 VITALS
DIASTOLIC BLOOD PRESSURE: 78 MMHG | HEART RATE: 68 BPM | BODY MASS INDEX: 32.09 KG/M2 | TEMPERATURE: 97.2 F | WEIGHT: 229.2 LBS | SYSTOLIC BLOOD PRESSURE: 130 MMHG | RESPIRATION RATE: 18 BRPM | HEIGHT: 71 IN | OXYGEN SATURATION: 95 %

## 2023-06-06 DIAGNOSIS — Z00.00 MEDICARE ANNUAL WELLNESS VISIT, SUBSEQUENT: Primary | ICD-10-CM

## 2023-06-06 PROCEDURE — 3078F DIAST BP <80 MM HG: CPT | Performed by: FAMILY MEDICINE

## 2023-06-06 PROCEDURE — 3075F SYST BP GE 130 - 139MM HG: CPT | Performed by: FAMILY MEDICINE

## 2023-06-06 PROCEDURE — G0439 PPPS, SUBSEQ VISIT: HCPCS | Performed by: FAMILY MEDICINE

## 2023-06-06 ASSESSMENT — LIFESTYLE VARIABLES
HOW OFTEN DO YOU HAVE A DRINK CONTAINING ALCOHOL: NEVER
HOW MANY STANDARD DRINKS CONTAINING ALCOHOL DO YOU HAVE ON A TYPICAL DAY: PATIENT DOES NOT DRINK

## 2023-06-06 ASSESSMENT — PATIENT HEALTH QUESTIONNAIRE - PHQ9
SUM OF ALL RESPONSES TO PHQ QUESTIONS 1-9: 0
1. LITTLE INTEREST OR PLEASURE IN DOING THINGS: 0
SUM OF ALL RESPONSES TO PHQ QUESTIONS 1-9: 0
SUM OF ALL RESPONSES TO PHQ9 QUESTIONS 1 & 2: 0
2. FEELING DOWN, DEPRESSED OR HOPELESS: 0

## 2023-06-06 NOTE — PATIENT INSTRUCTIONS
medicine. An overdose of any prescription medicine can be fatal. Overdose symptoms may include extreme drowsiness, muscle weakness, confusion, cold and clammy skin, pinpoint pupils, shallow breathing, slow heart rate, fainting, or coma. Don't share prescription medicines with others, even when they seem to have the same symptoms. What may be good for you may be harmful to others. If you are no longer taking a prescribed medication and you have pills left please take your pills out of their original containers. Mix crushed pills with an undesirable substance, such as cat litter or used coffee grounds. Put the mixture into a disposable container with a lid, such as an empty margarine tub, or into a sealable bag. Cover up or remove any of your personal information on the empty containers by covering it with black permanent marker or duct tape. Place the sealed container with the mixture, and the empty drug containers, in the trash. If you use a medication that is in the form of a patch, dispose of used patches by folding them in half so that the sticky sides meet, and then flushing them down a toilet. They should not be placed in the household trash where children or pets can find them. If you have any questions, ask your provider or pharmacist for more information. Be sure to keep all appointments for provider visits or tests. Preventing Falls: Care Instructions  Overview     Getting around your home safely can be a challenge if you have injuries or health problems that make it easy for you to fall. Loose rugs and furniture in walkways are among the dangers for many older people who have problems walking or who have poor eyesight. People who have conditions such as arthritis, osteoporosis, or dementia also have to be careful not to fall. You can make your home safer with a few simple measures. Follow-up care is a key part of your treatment and safety.  Be sure to make and go to all appointments, and

## 2023-06-06 NOTE — PROGRESS NOTES
Medicare Annual Wellness Visit    Alejandra Celestin is here for Medicare AWV    Assessment & Plan   Medicare annual wellness visit, subsequent    Recommendations for Preventive Services Due: see orders and patient instructions/AVS.  Recommended screening schedule for the next 5-10 years is provided to the patient in written form: see Patient Instructions/AVS.     No follow-ups on file. Subjective       Patient's complete Health Risk Assessment and screening values have been reviewed and are found in Flowsheets. The following problems were reviewed today and where indicated follow up appointments were made and/or referrals ordered. Positive Risk Factor Screenings with Interventions:    Fall Risk:  Do you feel unsteady or are you worried about falling? : (!) yes  2 or more falls in past year?: (!) yes  Fall with injury in past year?: no     Interventions:    Patient advised to follow-up in this office for further evaluation and treatment             Social and Emotional Support:  Do you get the social and emotional support that you need?: (!) No  Interventions:  Patient advised to follow up in the office for further evaluation and treatment    Weight and Activity:  Physical Activity: Inactive    Days of Exercise per Week: 0 days    Minutes of Exercise per Session: 0 min     On average, how many days per week do you engage in moderate to strenuous exercise (like a brisk walk)?: 0 days  Have you lost any weight without trying in the past 3 months?: No  Body mass index is 31.97 kg/m².  (!) Abnormal    Inactivity Interventions:  Patient advised to follow up in the office for further evaluation and treatment  Obesity Interventions:  Patient advised to follow-up in this office for further evaluation and treatment          Dentist Screen:  Have you seen the dentist within the past year?: (!) No    Intervention:  Advised to schedule with their dentist     Vision Screen:  Do you have difficulty driving, watching TV, or

## 2023-06-06 NOTE — PROGRESS NOTES
Chief Complaint   Patient presents with    Medicare AWV       Have you seen any other physician or provider since your last visit yes - cardiology    Have you had any other diagnostic tests since your last visit? no    Have you changed or stopped any medications since your last visit? no     Diabetic retinal exam completed this year?  No

## 2023-06-07 ENCOUNTER — TELEPHONE (OUTPATIENT)
Dept: PRIMARY CARE CLINIC | Age: 63
End: 2023-06-07

## 2023-06-27 DIAGNOSIS — I50.9 ACUTE ON CHRONIC CONGESTIVE HEART FAILURE, UNSPECIFIED HEART FAILURE TYPE (HCC): ICD-10-CM

## 2023-06-27 RX ORDER — SPIRONOLACTONE 25 MG/1
25 TABLET ORAL DAILY
Qty: 90 TABLET | Refills: 3 | Status: SHIPPED | OUTPATIENT
Start: 2023-06-27

## 2023-07-10 RX ORDER — TORSEMIDE 20 MG/1
TABLET ORAL
Qty: 90 TABLET | Refills: 1 | Status: SHIPPED | OUTPATIENT
Start: 2023-07-10

## 2023-07-11 ENCOUNTER — HOSPITAL ENCOUNTER (OUTPATIENT)
Facility: HOSPITAL | Age: 63
Discharge: HOME | End: 2023-07-11
Payer: MEDICARE

## 2023-07-11 ENCOUNTER — OFFICE VISIT (OUTPATIENT)
Dept: FAMILY MEDICINE CLINIC | Age: 63
End: 2023-07-11
Payer: MEDICARE

## 2023-07-11 VITALS
WEIGHT: 224 LBS | BODY MASS INDEX: 31.36 KG/M2 | HEART RATE: 71 BPM | DIASTOLIC BLOOD PRESSURE: 60 MMHG | RESPIRATION RATE: 18 BRPM | HEIGHT: 71 IN | SYSTOLIC BLOOD PRESSURE: 110 MMHG | TEMPERATURE: 97.4 F | OXYGEN SATURATION: 97 %

## 2023-07-11 DIAGNOSIS — E03.9 HYPOTHYROIDISM, UNSPECIFIED TYPE: ICD-10-CM

## 2023-07-11 DIAGNOSIS — E11.42 DIABETIC POLYNEUROPATHY ASSOCIATED WITH TYPE 2 DIABETES MELLITUS (HCC): ICD-10-CM

## 2023-07-11 DIAGNOSIS — M19.90 ARTHRITIS: ICD-10-CM

## 2023-07-11 DIAGNOSIS — I10 HYPERTENSION, UNSPECIFIED TYPE: Primary | ICD-10-CM

## 2023-07-11 DIAGNOSIS — G89.29 CHRONIC MIDLINE LOW BACK PAIN WITH BILATERAL SCIATICA: ICD-10-CM

## 2023-07-11 DIAGNOSIS — E11.8 TYPE 2 DIABETES MELLITUS WITH COMPLICATION (HCC): ICD-10-CM

## 2023-07-11 DIAGNOSIS — I48.91 ATRIAL FIBRILLATION, UNSPECIFIED TYPE (HCC): ICD-10-CM

## 2023-07-11 DIAGNOSIS — F41.9 ANXIETY: ICD-10-CM

## 2023-07-11 DIAGNOSIS — Z13.220 SCREENING, LIPID: ICD-10-CM

## 2023-07-11 DIAGNOSIS — M54.42 CHRONIC MIDLINE LOW BACK PAIN WITH BILATERAL SCIATICA: ICD-10-CM

## 2023-07-11 DIAGNOSIS — M54.41 CHRONIC MIDLINE LOW BACK PAIN WITH BILATERAL SCIATICA: ICD-10-CM

## 2023-07-11 PROCEDURE — 3074F SYST BP LT 130 MM HG: CPT | Performed by: FAMILY MEDICINE

## 2023-07-11 PROCEDURE — 80053 COMPREHEN METABOLIC PANEL: CPT

## 2023-07-11 PROCEDURE — 3052F HG A1C>EQUAL 8.0%<EQUAL 9.0%: CPT | Performed by: FAMILY MEDICINE

## 2023-07-11 PROCEDURE — 3078F DIAST BP <80 MM HG: CPT | Performed by: FAMILY MEDICINE

## 2023-07-11 PROCEDURE — 82977 ASSAY OF GGT: CPT

## 2023-07-11 PROCEDURE — 84439 ASSAY OF FREE THYROXINE: CPT

## 2023-07-11 PROCEDURE — 99214 OFFICE O/P EST MOD 30 MIN: CPT | Performed by: FAMILY MEDICINE

## 2023-07-11 PROCEDURE — 83036 HEMOGLOBIN GLYCOSYLATED A1C: CPT

## 2023-07-11 PROCEDURE — 80061 LIPID PANEL: CPT

## 2023-07-11 PROCEDURE — 85027 COMPLETE CBC AUTOMATED: CPT

## 2023-07-11 PROCEDURE — 84443 ASSAY THYROID STIM HORMONE: CPT

## 2023-07-11 PROCEDURE — 83735 ASSAY OF MAGNESIUM: CPT

## 2023-07-11 RX ORDER — OXYCODONE AND ACETAMINOPHEN 10; 325 MG/1; MG/1
1 TABLET ORAL EVERY 6 HOURS PRN
Qty: 90 TABLET | Refills: 0 | Status: SHIPPED | OUTPATIENT
Start: 2023-07-11 | End: 2023-08-10

## 2023-07-11 RX ORDER — PREGABALIN 50 MG/1
50 CAPSULE ORAL 3 TIMES DAILY
Qty: 270 CAPSULE | Refills: 1 | Status: SHIPPED | OUTPATIENT
Start: 2023-07-11 | End: 2024-01-07

## 2023-07-11 RX ORDER — CLONAZEPAM 1 MG/1
1 TABLET ORAL 2 TIMES DAILY PRN
Qty: 180 TABLET | Refills: 1 | Status: SHIPPED | OUTPATIENT
Start: 2023-07-11 | End: 2024-01-07

## 2023-07-11 ASSESSMENT — ENCOUNTER SYMPTOMS: BACK PAIN: 1

## 2023-07-12 LAB
ALBUMIN SERPL-MCNC: 3.6 G/DL (ref 3.4–4.8)
ALBUMIN/GLOB SERPL: 1.1 {RATIO} (ref 0.8–2)
ALP SERPL-CCNC: 253 U/L (ref 25–100)
ALT SERPL-CCNC: 36 U/L (ref 4–36)
ANION GAP SERPL CALCULATED.3IONS-SCNC: 11 MMOL/L (ref 3–16)
AST SERPL-CCNC: 37 U/L (ref 8–33)
BILIRUB SERPL-MCNC: 0.4 MG/DL (ref 0.3–1.2)
BUN SERPL-MCNC: 44 MG/DL (ref 6–20)
CALCIUM SERPL-MCNC: 8.8 MG/DL (ref 8.5–10.5)
CHLORIDE SERPL-SCNC: 99 MMOL/L (ref 98–107)
CHOLEST SERPL-MCNC: 101 MG/DL (ref 0–200)
CO2 SERPL-SCNC: 25 MMOL/L (ref 20–30)
CREAT SERPL-MCNC: 2.3 MG/DL (ref 0.4–1.2)
ERYTHROCYTE [DISTWIDTH] IN BLOOD BY AUTOMATED COUNT: 15.9 % (ref 11–16)
GFR SERPLBLD CREATININE-BSD FMLA CKD-EPI: 31 ML/MIN/{1.73_M2}
GGT SERPL-CCNC: 318 U/L (ref 8–61)
GLOBULIN SER CALC-MCNC: 3.2 G/DL
GLUCOSE SERPL-MCNC: 278 MG/DL (ref 74–106)
HBA1C MFR BLD: 8.9 %
HCT VFR BLD AUTO: 46.2 % (ref 40–54)
HDLC SERPL-MCNC: 39 MG/DL (ref 40–60)
HGB BLD-MCNC: 15.2 G/DL (ref 13–18)
LDLC SERPL CALC-MCNC: 44 MG/DL
MAGNESIUM SERPL-MCNC: 1.9 MG/DL (ref 1.7–2.4)
MCH RBC QN AUTO: 28.6 PG (ref 27–32)
MCHC RBC AUTO-ENTMCNC: 32.9 G/DL (ref 31–35)
MCV RBC AUTO: 86.8 FL (ref 80–100)
PLATELET # BLD AUTO: 120 K/UL (ref 150–400)
PMV BLD AUTO: 10.9 FL (ref 6–10)
POTASSIUM SERPL-SCNC: 5.1 MMOL/L (ref 3.4–5.1)
PROT SERPL-MCNC: 6.8 G/DL (ref 6.4–8.3)
RBC # BLD AUTO: 5.32 M/UL (ref 4.5–6)
SODIUM SERPL-SCNC: 135 MMOL/L (ref 136–145)
T4 FREE SERPL-MCNC: 1.48 NG/DL (ref 0.89–1.76)
TRIGL SERPL-MCNC: 90 MG/DL (ref 0–249)
TSH SERPL DL<=0.005 MIU/L-ACNC: 2.58 UIU/ML (ref 0.27–4.2)
VLDLC SERPL CALC-MCNC: 18 MG/DL
WBC # BLD AUTO: 8.8 K/UL (ref 4–11)

## 2023-07-12 RX ORDER — MAGNESIUM OXIDE 240 MG
1 POWDER IN PACKET (EA) ORAL DAILY
Qty: 30 EACH | Refills: 5 | Status: SHIPPED | OUTPATIENT
Start: 2023-07-12

## 2023-10-12 ENCOUNTER — OFFICE VISIT (OUTPATIENT)
Dept: FAMILY MEDICINE CLINIC | Age: 63
End: 2023-10-12
Payer: MEDICARE

## 2023-10-12 ENCOUNTER — HOSPITAL ENCOUNTER (OUTPATIENT)
Facility: HOSPITAL | Age: 63
Discharge: HOME | End: 2023-10-12
Payer: MEDICARE

## 2023-10-12 VITALS
TEMPERATURE: 97.2 F | DIASTOLIC BLOOD PRESSURE: 82 MMHG | SYSTOLIC BLOOD PRESSURE: 138 MMHG | HEIGHT: 71 IN | HEART RATE: 68 BPM | WEIGHT: 228.2 LBS | RESPIRATION RATE: 16 BRPM | BODY MASS INDEX: 31.95 KG/M2 | OXYGEN SATURATION: 98 %

## 2023-10-12 DIAGNOSIS — E11.8 TYPE 2 DIABETES MELLITUS WITH COMPLICATION (HCC): ICD-10-CM

## 2023-10-12 DIAGNOSIS — G89.29 CHRONIC MIDLINE LOW BACK PAIN WITH BILATERAL SCIATICA: ICD-10-CM

## 2023-10-12 DIAGNOSIS — R06.02 SOB (SHORTNESS OF BREATH): ICD-10-CM

## 2023-10-12 DIAGNOSIS — M19.90 ARTHRITIS: ICD-10-CM

## 2023-10-12 DIAGNOSIS — E03.9 HYPOTHYROIDISM, UNSPECIFIED TYPE: ICD-10-CM

## 2023-10-12 DIAGNOSIS — J30.2 SEASONAL ALLERGIES: ICD-10-CM

## 2023-10-12 DIAGNOSIS — I10 HYPERTENSION, UNSPECIFIED TYPE: ICD-10-CM

## 2023-10-12 DIAGNOSIS — I48.91 ATRIAL FIBRILLATION, UNSPECIFIED TYPE (HCC): ICD-10-CM

## 2023-10-12 DIAGNOSIS — M54.41 CHRONIC MIDLINE LOW BACK PAIN WITH BILATERAL SCIATICA: ICD-10-CM

## 2023-10-12 DIAGNOSIS — M54.42 CHRONIC MIDLINE LOW BACK PAIN WITH BILATERAL SCIATICA: ICD-10-CM

## 2023-10-12 DIAGNOSIS — E11.42 DIABETIC POLYNEUROPATHY ASSOCIATED WITH TYPE 2 DIABETES MELLITUS (HCC): Primary | ICD-10-CM

## 2023-10-12 PROCEDURE — 3046F HEMOGLOBIN A1C LEVEL >9.0%: CPT | Performed by: FAMILY MEDICINE

## 2023-10-12 PROCEDURE — 80053 COMPREHEN METABOLIC PANEL: CPT

## 2023-10-12 PROCEDURE — 85027 COMPLETE CBC AUTOMATED: CPT

## 2023-10-12 PROCEDURE — 83036 HEMOGLOBIN GLYCOSYLATED A1C: CPT

## 2023-10-12 PROCEDURE — 83735 ASSAY OF MAGNESIUM: CPT

## 2023-10-12 PROCEDURE — 99214 OFFICE O/P EST MOD 30 MIN: CPT | Performed by: FAMILY MEDICINE

## 2023-10-12 PROCEDURE — 36415 COLL VENOUS BLD VENIPUNCTURE: CPT

## 2023-10-12 PROCEDURE — 84443 ASSAY THYROID STIM HORMONE: CPT

## 2023-10-12 PROCEDURE — 82977 ASSAY OF GGT: CPT

## 2023-10-12 PROCEDURE — 3074F SYST BP LT 130 MM HG: CPT | Performed by: FAMILY MEDICINE

## 2023-10-12 PROCEDURE — 3078F DIAST BP <80 MM HG: CPT | Performed by: FAMILY MEDICINE

## 2023-10-12 RX ORDER — OXYCODONE AND ACETAMINOPHEN 10; 325 MG/1; MG/1
1 TABLET ORAL EVERY 6 HOURS PRN
Qty: 90 TABLET | Refills: 0 | Status: SHIPPED | OUTPATIENT
Start: 2023-10-12 | End: 2023-11-11

## 2023-10-12 RX ORDER — PREGABALIN 75 MG/1
75 CAPSULE ORAL 3 TIMES DAILY
Qty: 90 CAPSULE | Refills: 2 | Status: SHIPPED | OUTPATIENT
Start: 2023-10-12 | End: 2024-01-10

## 2023-10-12 RX ORDER — OLOPATADINE HYDROCHLORIDE 1 MG/ML
1 SOLUTION/ DROPS OPHTHALMIC 2 TIMES DAILY
Qty: 5 ML | Refills: 2 | Status: SHIPPED | OUTPATIENT
Start: 2023-10-12 | End: 2024-03-10

## 2023-10-12 ASSESSMENT — ENCOUNTER SYMPTOMS: BACK PAIN: 1

## 2023-10-13 LAB
ALBUMIN SERPL-MCNC: 3.7 G/DL (ref 3.4–4.8)
ALBUMIN/GLOB SERPL: 1.1 {RATIO} (ref 0.8–2)
ALP SERPL-CCNC: 274 U/L (ref 25–100)
ALT SERPL-CCNC: 37 U/L (ref 4–36)
ANION GAP SERPL CALCULATED.3IONS-SCNC: 10 MMOL/L (ref 3–16)
AST SERPL-CCNC: 36 U/L (ref 8–33)
BILIRUB SERPL-MCNC: 0.5 MG/DL (ref 0.3–1.2)
BUN SERPL-MCNC: 32 MG/DL (ref 6–20)
CALCIUM SERPL-MCNC: 9 MG/DL (ref 8.5–10.5)
CHLORIDE SERPL-SCNC: 97 MMOL/L (ref 98–107)
CO2 SERPL-SCNC: 26 MMOL/L (ref 20–30)
CREAT SERPL-MCNC: 2 MG/DL (ref 0.4–1.2)
ERYTHROCYTE [DISTWIDTH] IN BLOOD BY AUTOMATED COUNT: 15.9 % (ref 11–16)
GFR SERPLBLD CREATININE-BSD FMLA CKD-EPI: 37 ML/MIN/{1.73_M2}
GGT SERPL-CCNC: 389 U/L (ref 8–61)
GLOBULIN SER CALC-MCNC: 3.3 G/DL
GLUCOSE SERPL-MCNC: 287 MG/DL (ref 74–106)
HBA1C MFR BLD: 10 %
HCT VFR BLD AUTO: 45.6 % (ref 40–54)
HGB BLD-MCNC: 15.2 G/DL (ref 13–18)
MAGNESIUM SERPL-MCNC: 2.1 MG/DL (ref 1.7–2.4)
MCH RBC QN AUTO: 28.8 PG (ref 27–32)
MCHC RBC AUTO-ENTMCNC: 33.3 G/DL (ref 31–35)
MCV RBC AUTO: 86.5 FL (ref 80–100)
PLATELET # BLD AUTO: 132 K/UL (ref 150–400)
PMV BLD AUTO: 10.2 FL (ref 6–10)
POTASSIUM SERPL-SCNC: 5.4 MMOL/L (ref 3.4–5.1)
PROT SERPL-MCNC: 7 G/DL (ref 6.4–8.3)
RBC # BLD AUTO: 5.27 M/UL (ref 4.5–6)
SODIUM SERPL-SCNC: 133 MMOL/L (ref 136–145)
TSH SERPL DL<=0.005 MIU/L-ACNC: 3.71 UIU/ML (ref 0.27–4.2)
WBC # BLD AUTO: 10.4 K/UL (ref 4–11)

## 2023-11-27 ENCOUNTER — HOSPITAL ENCOUNTER (OUTPATIENT)
Dept: NUTRITION | Facility: HOSPITAL | Age: 63
Discharge: HOME | End: 2023-11-27
Payer: MEDICARE

## 2023-11-27 PROCEDURE — 97802 MEDICAL NUTRITION INDIV IN: CPT

## 2023-11-28 VITALS — HEIGHT: 71 IN | BODY MASS INDEX: 31.83 KG/M2

## 2023-11-28 NOTE — PROGRESS NOTES
Nutrition Education    Educated on Consistent CHO Nutrition Therapy, CHO counting, MyPlate method for DM, portion sizes for various foods, and how to read food labels. RD explained that carbohydrate containing foods shouldn't be completely eliminated, but the portion size and frequency should be modified to help manage blood glucose levels. We also discussed the 15-15 rule for hypoglycemia and the importance of eating consistent meals throughout the day rather than one large meal. I explained the importance of taking his insulin as prescribed and rotating injection sites, as well as the importance of checking his blood glucose regularly. Learners: Patient and Family  Readiness:  seems hesitant  Method: Explanation, Demonstration, and Handout  Response: Verbalizes Understanding  Contact name and number provided.     Emili Judge RD

## 2024-01-04 NOTE — TELEPHONE ENCOUNTER
Refill request received from pharmacy      Next Office Visit Date:  Future Appointments   Date Time Provider Department Center   1/9/2024  1:45 PM Sarah Logan MD [facility] [place] [facility] - Please review via PDMP        Last Office Visit:    10/12/2023

## 2024-01-08 DIAGNOSIS — R06.02 SOB (SHORTNESS OF BREATH): ICD-10-CM

## 2024-01-08 DIAGNOSIS — I50.9 CONGESTIVE HEART FAILURE, UNSPECIFIED HF CHRONICITY, UNSPECIFIED HEART FAILURE TYPE (HCC): ICD-10-CM

## 2024-01-08 RX ORDER — TORSEMIDE 20 MG/1
20 TABLET ORAL DAILY
Qty: 90 TABLET | Refills: 1 | Status: SHIPPED | OUTPATIENT
Start: 2024-01-08

## 2024-01-09 ENCOUNTER — HOSPITAL ENCOUNTER (OUTPATIENT)
Facility: HOSPITAL | Age: 64
Discharge: HOME | End: 2024-01-09
Payer: MEDICARE

## 2024-01-09 ENCOUNTER — OFFICE VISIT (OUTPATIENT)
Dept: FAMILY MEDICINE CLINIC | Age: 64
End: 2024-01-09
Payer: MEDICARE

## 2024-01-09 VITALS
WEIGHT: 235.2 LBS | SYSTOLIC BLOOD PRESSURE: 132 MMHG | HEART RATE: 98 BPM | DIASTOLIC BLOOD PRESSURE: 84 MMHG | TEMPERATURE: 97.9 F | HEIGHT: 71 IN | OXYGEN SATURATION: 100 % | BODY MASS INDEX: 32.93 KG/M2 | RESPIRATION RATE: 16 BRPM

## 2024-01-09 DIAGNOSIS — I10 ESSENTIAL HYPERTENSION: ICD-10-CM

## 2024-01-09 DIAGNOSIS — E55.9 VITAMIN D DEFICIENCY: ICD-10-CM

## 2024-01-09 DIAGNOSIS — I48.91 ATRIAL FIBRILLATION, UNSPECIFIED TYPE (HCC): ICD-10-CM

## 2024-01-09 DIAGNOSIS — I50.9 ACUTE ON CHRONIC CONGESTIVE HEART FAILURE, UNSPECIFIED HEART FAILURE TYPE (HCC): ICD-10-CM

## 2024-01-09 DIAGNOSIS — E11.8 TYPE 2 DIABETES MELLITUS WITH COMPLICATION (HCC): ICD-10-CM

## 2024-01-09 DIAGNOSIS — F41.9 ANXIETY: ICD-10-CM

## 2024-01-09 DIAGNOSIS — M54.42 CHRONIC MIDLINE LOW BACK PAIN WITH BILATERAL SCIATICA: ICD-10-CM

## 2024-01-09 DIAGNOSIS — M19.90 ARTHRITIS: ICD-10-CM

## 2024-01-09 DIAGNOSIS — M54.41 CHRONIC MIDLINE LOW BACK PAIN WITH BILATERAL SCIATICA: ICD-10-CM

## 2024-01-09 DIAGNOSIS — E11.42 DIABETIC POLYNEUROPATHY ASSOCIATED WITH TYPE 2 DIABETES MELLITUS (HCC): ICD-10-CM

## 2024-01-09 DIAGNOSIS — G89.29 CHRONIC MIDLINE LOW BACK PAIN WITH BILATERAL SCIATICA: ICD-10-CM

## 2024-01-09 DIAGNOSIS — I50.9 CONGESTIVE HEART FAILURE, UNSPECIFIED HF CHRONICITY, UNSPECIFIED HEART FAILURE TYPE (HCC): Primary | ICD-10-CM

## 2024-01-09 PROCEDURE — 83735 ASSAY OF MAGNESIUM: CPT

## 2024-01-09 PROCEDURE — 82977 ASSAY OF GGT: CPT

## 2024-01-09 PROCEDURE — 85027 COMPLETE CBC AUTOMATED: CPT

## 2024-01-09 PROCEDURE — 83036 HEMOGLOBIN GLYCOSYLATED A1C: CPT

## 2024-01-09 PROCEDURE — 99214 OFFICE O/P EST MOD 30 MIN: CPT | Performed by: FAMILY MEDICINE

## 2024-01-09 PROCEDURE — 3079F DIAST BP 80-89 MM HG: CPT | Performed by: FAMILY MEDICINE

## 2024-01-09 PROCEDURE — 82306 VITAMIN D 25 HYDROXY: CPT

## 2024-01-09 PROCEDURE — 3075F SYST BP GE 130 - 139MM HG: CPT | Performed by: FAMILY MEDICINE

## 2024-01-09 PROCEDURE — 80053 COMPREHEN METABOLIC PANEL: CPT

## 2024-01-09 PROCEDURE — 3046F HEMOGLOBIN A1C LEVEL >9.0%: CPT | Performed by: FAMILY MEDICINE

## 2024-01-09 RX ORDER — OXYCODONE AND ACETAMINOPHEN 10; 325 MG/1; MG/1
1 TABLET ORAL EVERY 6 HOURS PRN
Qty: 90 TABLET | Refills: 0 | Status: SHIPPED | OUTPATIENT
Start: 2024-01-09 | End: 2024-02-08

## 2024-01-09 RX ORDER — FLUTICASONE FUROATE AND VILANTEROL TRIFENATATE 200; 25 UG/1; UG/1
POWDER RESPIRATORY (INHALATION)
Qty: 1 EACH | Refills: 3 | Status: SHIPPED | OUTPATIENT
Start: 2024-01-09

## 2024-01-09 RX ORDER — PREGABALIN 75 MG/1
75 CAPSULE ORAL 3 TIMES DAILY
Qty: 90 CAPSULE | Refills: 2 | Status: SHIPPED | OUTPATIENT
Start: 2024-01-09 | End: 2024-04-08

## 2024-01-09 RX ORDER — CLONAZEPAM 1 MG/1
1 TABLET ORAL 2 TIMES DAILY PRN
Qty: 180 TABLET | Refills: 1 | Status: SHIPPED | OUTPATIENT
Start: 2024-01-09 | End: 2024-07-07

## 2024-01-09 ASSESSMENT — PATIENT HEALTH QUESTIONNAIRE - PHQ9
2. FEELING DOWN, DEPRESSED OR HOPELESS: 0
SUM OF ALL RESPONSES TO PHQ9 QUESTIONS 1 & 2: 0
SUM OF ALL RESPONSES TO PHQ QUESTIONS 1-9: 0
1. LITTLE INTEREST OR PLEASURE IN DOING THINGS: 0

## 2024-01-09 NOTE — PATIENT INSTRUCTIONS
Pre-Visit chart review completed. All lab and imaging orders reviewed for outstanding orders and none found. Referrals reviewed and none outstanding. All Health Maintenance requirements reviewed and noted for any that are due at upcoming visit. Any hospital admission documentation, ER documentation or consult notes scanned to chart since last visit have been reviewed and noted for provider to review during upcoming visit.

## 2024-01-09 NOTE — TELEPHONE ENCOUNTER
Refill request received from pharmacy      Next Office Visit Date:  Future Appointments   Date Time Provider Department Center   1/9/2024  1:45 PM Sarah Logan MD [facility] [place] [facility] - Please review via PDMP        Last Office Visit:    10/12/2023

## 2024-01-09 NOTE — PROGRESS NOTES
SUBJECTIVE:    Patient ID: Jorge Resendez is a 63 y.o. male.    Chief Complaint   Patient presents with    Hypertension     Reg F/U    Diabetes     Reg F/U       HPI: office visit  He is still having some continued fluctuations in his blood sugars.  He is still having quite a bit of issues with keeping it down.  He says he is does struggle with his diet.  He is still not taking his medicines like he is supposed to.  He continues to be significantly noncompliant.  He has not had any chest pain.  He denies any changes in his shortness of breath.  He has not had any significant palpitations.  His blood pressures are doing well at home.  His wife is with him giving part of his history.  He has not had any recent falls or injuries.  He does continue to complain of some significant low back pain.  He says it keeps him from sleeping if he does not take medicine.  He has not had any medication problems per se other than the fact that he really does not want to take them.    Review of Systems   Musculoskeletal:  Positive for back pain and gait problem.   All other systems reviewed and are negative.       OBJECTIVE:  /84   Pulse 98   Temp 97.9 °F (36.6 °C) (Infrared)   Resp 16   Ht 1.803 m (5' 11\")   Wt 106.7 kg (235 lb 3.2 oz)   SpO2 100% Comment: ra  BMI 32.80 kg/m²    Wt Readings from Last 3 Encounters:   01/09/24 106.7 kg (235 lb 3.2 oz)   10/12/23 103.5 kg (228 lb 3.2 oz)   07/11/23 101.6 kg (224 lb)     BP Readings from Last 3 Encounters:   01/09/24 132/84   10/12/23 138/82   07/11/23 110/60      Pulse Readings from Last 3 Encounters:   01/09/24 98   10/12/23 68   07/11/23 71     Body mass index is 32.8 kg/m².   Resp Readings from Last 3 Encounters:   01/09/24 16   10/12/23 16   07/11/23 18     Past medical, surgical, family and social history were reviewed and updated with the patient.     Physical Exam  Vitals and nursing note reviewed.   Constitutional:       Appearance: He is well-developed.   HENT:

## 2024-01-09 NOTE — PROGRESS NOTES
Chief Complaint   Patient presents with    Hypertension     Reg F/U    Diabetes     Reg F/U       Have you seen any other physician or provider since your last visit yes - Heart Specialist    Have you had any other diagnostic tests since your last visit? yes - EKG    Have you changed or stopped any medications since your last visit? no

## 2024-01-10 LAB
25(OH)D3 SERPL-MCNC: 30.1 NG/ML (ref 32–100)
ALBUMIN SERPL-MCNC: 3.5 G/DL (ref 3.4–4.8)
ALBUMIN/GLOB SERPL: 1.1 {RATIO} (ref 0.8–2)
ALP SERPL-CCNC: 233 U/L (ref 25–100)
ALT SERPL-CCNC: 29 U/L (ref 4–36)
ANION GAP SERPL CALCULATED.3IONS-SCNC: 8 MMOL/L (ref 3–16)
AST SERPL-CCNC: 30 U/L (ref 8–33)
BILIRUB SERPL-MCNC: 0.5 MG/DL (ref 0.3–1.2)
BUN SERPL-MCNC: 33 MG/DL (ref 6–20)
CALCIUM SERPL-MCNC: 8.5 MG/DL (ref 8.5–10.5)
CHLORIDE SERPL-SCNC: 99 MMOL/L (ref 98–107)
CO2 SERPL-SCNC: 25 MMOL/L (ref 20–30)
CREAT SERPL-MCNC: 2.1 MG/DL (ref 0.4–1.2)
ERYTHROCYTE [DISTWIDTH] IN BLOOD BY AUTOMATED COUNT: 15.1 % (ref 11–16)
GFR SERPLBLD CREATININE-BSD FMLA CKD-EPI: 35 ML/MIN/{1.73_M2}
GGT SERPL-CCNC: 371 U/L (ref 8–61)
GLOBULIN SER CALC-MCNC: 3.2 G/DL
GLUCOSE SERPL-MCNC: 322 MG/DL (ref 74–106)
HBA1C MFR BLD: 11 %
HCT VFR BLD AUTO: 43.7 % (ref 40–54)
HGB BLD-MCNC: 14.5 G/DL (ref 13–18)
MAGNESIUM SERPL-MCNC: 2 MG/DL (ref 1.7–2.4)
MCH RBC QN AUTO: 28.5 PG (ref 27–32)
MCHC RBC AUTO-ENTMCNC: 33.2 G/DL (ref 31–35)
MCV RBC AUTO: 85.9 FL (ref 80–100)
PLATELET # BLD AUTO: 130 K/UL (ref 150–400)
PMV BLD AUTO: 10.9 FL (ref 6–10)
POTASSIUM SERPL-SCNC: 5.4 MMOL/L (ref 3.4–5.1)
PROT SERPL-MCNC: 6.7 G/DL (ref 6.4–8.3)
RBC # BLD AUTO: 5.09 M/UL (ref 4.5–6)
SODIUM SERPL-SCNC: 132 MMOL/L (ref 136–145)
WBC # BLD AUTO: 9.8 K/UL (ref 4–11)

## 2024-01-10 RX ORDER — PROCHLORPERAZINE 25 MG/1
1 SUPPOSITORY RECTAL DAILY
Qty: 3 EACH | Refills: 5 | Status: SHIPPED | OUTPATIENT
Start: 2024-01-10

## 2024-01-10 RX ORDER — PROCHLORPERAZINE 25 MG/1
1 SUPPOSITORY RECTAL DAILY
Qty: 1 EACH | Refills: 5 | Status: SHIPPED | OUTPATIENT
Start: 2024-01-10

## 2024-04-03 DIAGNOSIS — F41.9 ANXIETY: ICD-10-CM

## 2024-04-03 DIAGNOSIS — I48.91 ATRIAL FIBRILLATION, UNSPECIFIED TYPE (HCC): ICD-10-CM

## 2024-04-03 DIAGNOSIS — I10 HYPERTENSION, UNSPECIFIED TYPE: ICD-10-CM

## 2024-04-03 RX ORDER — PANTOPRAZOLE SODIUM 40 MG/1
40 TABLET, DELAYED RELEASE ORAL
Qty: 90 TABLET | Refills: 3 | Status: SHIPPED | OUTPATIENT
Start: 2024-04-03

## 2024-04-03 RX ORDER — PRAVASTATIN SODIUM 40 MG
TABLET ORAL
Qty: 90 TABLET | Refills: 3 | Status: SHIPPED | OUTPATIENT
Start: 2024-04-03

## 2024-04-03 RX ORDER — TRAZODONE HYDROCHLORIDE 150 MG/1
TABLET ORAL
Qty: 90 TABLET | Refills: 3 | Status: SHIPPED | OUTPATIENT
Start: 2024-04-03

## 2024-04-03 NOTE — TELEPHONE ENCOUNTER
Refill request received from pharmacy      Next Office Visit Date:  Future Appointments   Date Time Provider Department Center   4/9/2024  2:00 PM Sarah Logan MD [facility] [place] [facility] - Please review via PDMP        Last Office Visit:    1/9/2024

## 2024-04-09 ENCOUNTER — OFFICE VISIT (OUTPATIENT)
Dept: FAMILY MEDICINE CLINIC | Age: 64
End: 2024-04-09
Payer: MEDICARE

## 2024-04-09 ENCOUNTER — HOSPITAL ENCOUNTER (OUTPATIENT)
Facility: HOSPITAL | Age: 64
Discharge: HOME | End: 2024-04-09
Payer: MEDICARE

## 2024-04-09 VITALS
OXYGEN SATURATION: 97 % | DIASTOLIC BLOOD PRESSURE: 74 MMHG | HEART RATE: 89 BPM | RESPIRATION RATE: 16 BRPM | TEMPERATURE: 97.3 F | WEIGHT: 236.8 LBS | HEIGHT: 71 IN | SYSTOLIC BLOOD PRESSURE: 132 MMHG | BODY MASS INDEX: 33.15 KG/M2

## 2024-04-09 DIAGNOSIS — M54.41 CHRONIC MIDLINE LOW BACK PAIN WITH BILATERAL SCIATICA: ICD-10-CM

## 2024-04-09 DIAGNOSIS — M19.90 ARTHRITIS: ICD-10-CM

## 2024-04-09 DIAGNOSIS — I10 ESSENTIAL HYPERTENSION: ICD-10-CM

## 2024-04-09 DIAGNOSIS — E11.8 TYPE 2 DIABETES MELLITUS WITH COMPLICATION (HCC): Primary | ICD-10-CM

## 2024-04-09 DIAGNOSIS — E03.9 HYPOTHYROIDISM, UNSPECIFIED TYPE: ICD-10-CM

## 2024-04-09 DIAGNOSIS — G89.29 CHRONIC MIDLINE LOW BACK PAIN WITH BILATERAL SCIATICA: ICD-10-CM

## 2024-04-09 DIAGNOSIS — E11.42 DIABETIC POLYNEUROPATHY ASSOCIATED WITH TYPE 2 DIABETES MELLITUS (HCC): ICD-10-CM

## 2024-04-09 DIAGNOSIS — M54.42 CHRONIC MIDLINE LOW BACK PAIN WITH BILATERAL SCIATICA: ICD-10-CM

## 2024-04-09 DIAGNOSIS — F41.9 ANXIETY: ICD-10-CM

## 2024-04-09 DIAGNOSIS — E11.8 TYPE 2 DIABETES MELLITUS WITH COMPLICATION (HCC): ICD-10-CM

## 2024-04-09 PROCEDURE — 85027 COMPLETE CBC AUTOMATED: CPT

## 2024-04-09 PROCEDURE — 3078F DIAST BP <80 MM HG: CPT | Performed by: FAMILY MEDICINE

## 2024-04-09 PROCEDURE — 82977 ASSAY OF GGT: CPT

## 2024-04-09 PROCEDURE — 80053 COMPREHEN METABOLIC PANEL: CPT

## 2024-04-09 PROCEDURE — 83735 ASSAY OF MAGNESIUM: CPT

## 2024-04-09 PROCEDURE — 36415 COLL VENOUS BLD VENIPUNCTURE: CPT

## 2024-04-09 PROCEDURE — 83036 HEMOGLOBIN GLYCOSYLATED A1C: CPT

## 2024-04-09 PROCEDURE — 3046F HEMOGLOBIN A1C LEVEL >9.0%: CPT | Performed by: FAMILY MEDICINE

## 2024-04-09 PROCEDURE — 99214 OFFICE O/P EST MOD 30 MIN: CPT | Performed by: FAMILY MEDICINE

## 2024-04-09 PROCEDURE — 3075F SYST BP GE 130 - 139MM HG: CPT | Performed by: FAMILY MEDICINE

## 2024-04-09 RX ORDER — PREGABALIN 75 MG/1
75 CAPSULE ORAL 3 TIMES DAILY
Qty: 90 CAPSULE | Refills: 2 | Status: SHIPPED | OUTPATIENT
Start: 2024-04-09 | End: 2024-07-08

## 2024-04-09 RX ORDER — CLONAZEPAM 1 MG/1
1 TABLET ORAL 2 TIMES DAILY PRN
Qty: 180 TABLET | Refills: 1 | Status: SHIPPED | OUTPATIENT
Start: 2024-04-09 | End: 2024-10-06

## 2024-04-09 RX ORDER — OXYCODONE AND ACETAMINOPHEN 10; 325 MG/1; MG/1
1 TABLET ORAL EVERY 6 HOURS PRN
Qty: 90 TABLET | Refills: 0 | Status: SHIPPED | OUTPATIENT
Start: 2024-04-09 | End: 2024-05-09

## 2024-04-09 NOTE — PROGRESS NOTES
SUBJECTIVE:    Patient ID: Jorge Resendez is a 64 y.o. male.    Chief Complaint   Patient presents with    Hypertension     Reg F/U    Diabetes     Reg F/U       HPI: office visit  He is in the office today in follow-up of his diabetes.  He is doing pretty well with his medicine he thinks though he admits he does not take it like he should.  He is not checking his blood sugars like he should.  He is not checking blood pressures.  He does have some swelling.  He does have some palpitations.  Does have cardiology follow-up.  He is having quite a bit of fluid accumulation in his abdomen.  I discussed with him that I am concerned about his liver due to his diabetes and his poor control over the years.  He does not take his diet or his medicine very seriously.  He has not had any recent falls or injuries.  He is not having any chest pain.  He denies any significant shortness of breath.    Review of Systems   Musculoskeletal:  Positive for back pain and gait problem.   All other systems reviewed and are negative.       OBJECTIVE:  /74   Pulse 89   Temp 97.3 °F (36.3 °C) (Infrared)   Resp 16   Ht 1.803 m (5' 11\")   Wt 107.4 kg (236 lb 12.8 oz)   SpO2 97% Comment: ra  BMI 33.03 kg/m²    Wt Readings from Last 3 Encounters:   04/09/24 107.4 kg (236 lb 12.8 oz)   01/09/24 106.7 kg (235 lb 3.2 oz)   10/12/23 103.5 kg (228 lb 3.2 oz)     BP Readings from Last 3 Encounters:   04/09/24 132/74   01/09/24 132/84   10/12/23 138/82      Pulse Readings from Last 3 Encounters:   04/09/24 89   01/09/24 98   10/12/23 68     Body mass index is 33.03 kg/m².   Resp Readings from Last 3 Encounters:   04/09/24 16   01/09/24 16   10/12/23 16     Past medical, surgical, family and social history were reviewed and updated with the patient.     Physical Exam  Vitals and nursing note reviewed.   Constitutional:       Appearance: He is well-developed.   HENT:      Head: Normocephalic and atraumatic.      Right Ear: Hearing and external

## 2024-04-09 NOTE — PROGRESS NOTES
Chief Complaint   Patient presents with    Hypertension     Reg F/U    Diabetes     Reg F/U       Have you seen any other physician or provider since your last visit no    Have you had any other diagnostic tests since your last visit? no    Have you changed or stopped any medications since your last visit? no

## 2024-04-10 LAB
ALBUMIN SERPL-MCNC: 3.7 G/DL (ref 3.4–4.8)
ALBUMIN/GLOB SERPL: 1.1 {RATIO} (ref 0.8–2)
ALP SERPL-CCNC: 242 U/L (ref 25–100)
ALT SERPL-CCNC: 25 U/L (ref 4–36)
ANION GAP SERPL CALCULATED.3IONS-SCNC: 13 MMOL/L (ref 3–16)
AST SERPL-CCNC: 37 U/L (ref 8–33)
BILIRUB SERPL-MCNC: 0.4 MG/DL (ref 0.3–1.2)
BUN SERPL-MCNC: 44 MG/DL (ref 6–20)
CALCIUM SERPL-MCNC: 9.2 MG/DL (ref 8.5–10.5)
CHLORIDE SERPL-SCNC: 95 MMOL/L (ref 98–107)
CO2 SERPL-SCNC: 28 MMOL/L (ref 20–30)
CREAT SERPL-MCNC: 2.5 MG/DL (ref 0.4–1.2)
ERYTHROCYTE [DISTWIDTH] IN BLOOD BY AUTOMATED COUNT: 15.5 % (ref 11–16)
GFR SERPLBLD CREATININE-BSD FMLA CKD-EPI: 28 ML/MIN/{1.73_M2}
GGT SERPL-CCNC: 312 U/L (ref 8–61)
GLOBULIN SER CALC-MCNC: 3.3 G/DL
GLUCOSE SERPL-MCNC: 121 MG/DL (ref 74–106)
HBA1C MFR BLD: 10.3 %
HCT VFR BLD AUTO: 49 % (ref 40–54)
HGB BLD-MCNC: 15.6 G/DL (ref 13–18)
MAGNESIUM SERPL-MCNC: 2.1 MG/DL (ref 1.7–2.4)
MCH RBC QN AUTO: 27.1 PG (ref 27–32)
MCHC RBC AUTO-ENTMCNC: 31.8 G/DL (ref 31–35)
MCV RBC AUTO: 85.1 FL (ref 80–100)
PLATELET # BLD AUTO: 134 K/UL (ref 150–400)
PMV BLD AUTO: 10.6 FL (ref 6–10)
POTASSIUM SERPL-SCNC: 5.8 MMOL/L (ref 3.4–5.1)
PROT SERPL-MCNC: 7 G/DL (ref 6.4–8.3)
RBC # BLD AUTO: 5.76 M/UL (ref 4.5–6)
SODIUM SERPL-SCNC: 136 MMOL/L (ref 136–145)
WBC # BLD AUTO: 9.7 K/UL (ref 4–11)

## 2024-04-22 DIAGNOSIS — N18.9 CHRONIC RENAL IMPAIRMENT, UNSPECIFIED CKD STAGE: Primary | ICD-10-CM

## 2024-04-22 RX ORDER — APIXABAN 2.5 MG/1
2.5 TABLET, FILM COATED ORAL 2 TIMES DAILY
Qty: 180 TABLET | Refills: 3 | Status: SHIPPED | OUTPATIENT
Start: 2024-04-22

## 2024-04-22 NOTE — TELEPHONE ENCOUNTER
Refill request received from pharmacy      Next Office Visit Date:  Future Appointments   Date Time Provider Department Center   7/10/2024 10:30 AM Sarah Logan MD [facility] [place] [facility] - Please review via PDMP        Last Office Visit:    4/9/2024

## 2024-05-03 DIAGNOSIS — I48.91 ATRIAL FIBRILLATION, UNSPECIFIED TYPE (HCC): ICD-10-CM

## 2024-05-03 RX ORDER — DOFETILIDE 0.25 MG/1
250 CAPSULE ORAL 2 TIMES DAILY
Qty: 180 CAPSULE | Refills: 3 | Status: SHIPPED | OUTPATIENT
Start: 2024-05-03

## 2024-05-03 NOTE — TELEPHONE ENCOUNTER
Refill request received from pharmacy      Next Office Visit Date:  Future Appointments   Date Time Provider Department Center   7/10/2024 10:30 AM Sarah Logan MD [facility] [place] [facility] - Please review via PDMP        Last Office Visit:    4/9/2024

## 2024-06-07 ENCOUNTER — COMMUNITY OUTREACH (OUTPATIENT)
Dept: FAMILY MEDICINE CLINIC | Age: 64
End: 2024-06-07

## 2024-06-14 DIAGNOSIS — E03.9 HYPOTHYROIDISM, UNSPECIFIED TYPE: ICD-10-CM

## 2024-06-15 RX ORDER — CEPHALEXIN 500 MG/1
500 CAPSULE ORAL 3 TIMES DAILY
Qty: 30 CAPSULE | Refills: 0 | Status: SHIPPED | OUTPATIENT
Start: 2024-06-15 | End: 2024-06-25

## 2024-06-17 RX ORDER — LEVOTHYROXINE SODIUM 0.1 MG/1
100 TABLET ORAL DAILY
Qty: 90 TABLET | Refills: 3 | Status: SHIPPED | OUTPATIENT
Start: 2024-06-17

## 2024-06-17 NOTE — TELEPHONE ENCOUNTER
Refill request received from pharmacy      Next Office Visit Date:  Future Appointments   Date Time Provider Department Center   7/10/2024 10:30 AM Sarah Logan MD [facility] [place] [facility] - Please review via PDMP        Last Office Visit:    4/9/2024

## 2024-07-02 ENCOUNTER — CARE COORDINATION (OUTPATIENT)
Dept: PRIMARY CARE CLINIC | Age: 64
End: 2024-07-02

## 2024-07-02 NOTE — CARE COORDINATION
Care Transitions Initial Follow Up Call    Call within 2 business days of discharge: Yes     Patient: Jorge Resendez Patient : 1960 MRN: [id number]    Last Discharge Facility       None     [facility]  cellulitis, hypotension, pleural effusion       RARS: No data recorded     Spoke with: Attempting HFU call, unsuccessful.  Message left with contact information.     Discharge department/facility: [facility]     Non-face-to-face services provided:  Scheduled appointment with PCP-Doreen  Obtained and reviewed discharge summary and/or continuity of care documents    Follow Up  Future Appointments   Date Time Provider Department Center   7/3/2024  3:30 PM Sarah Logan MD [facility]       Vincent Montemayor RN

## 2024-07-03 ENCOUNTER — TELEPHONE (OUTPATIENT)
Dept: FAMILY MEDICINE CLINIC | Age: 64
End: 2024-07-03

## 2024-07-03 ENCOUNTER — OFFICE VISIT (OUTPATIENT)
Dept: FAMILY MEDICINE CLINIC | Age: 64
End: 2024-07-03

## 2024-07-03 VITALS
DIASTOLIC BLOOD PRESSURE: 60 MMHG | HEIGHT: 71 IN | OXYGEN SATURATION: 95 % | HEART RATE: 98 BPM | WEIGHT: 228.2 LBS | TEMPERATURE: 97.8 F | RESPIRATION RATE: 18 BRPM | BODY MASS INDEX: 31.95 KG/M2 | SYSTOLIC BLOOD PRESSURE: 108 MMHG

## 2024-07-03 DIAGNOSIS — M19.90 ARTHRITIS: ICD-10-CM

## 2024-07-03 DIAGNOSIS — E11.42 DIABETIC POLYNEUROPATHY ASSOCIATED WITH TYPE 2 DIABETES MELLITUS (HCC): Primary | ICD-10-CM

## 2024-07-03 DIAGNOSIS — I48.91 ATRIAL FIBRILLATION, UNSPECIFIED TYPE (HCC): ICD-10-CM

## 2024-07-03 DIAGNOSIS — R53.1 WEAKNESS GENERALIZED: ICD-10-CM

## 2024-07-03 DIAGNOSIS — E11.8 TYPE 2 DIABETES MELLITUS WITH COMPLICATION (HCC): Primary | ICD-10-CM

## 2024-07-03 DIAGNOSIS — G89.29 CHRONIC MIDLINE LOW BACK PAIN WITH BILATERAL SCIATICA: ICD-10-CM

## 2024-07-03 DIAGNOSIS — M54.41 CHRONIC MIDLINE LOW BACK PAIN WITH BILATERAL SCIATICA: ICD-10-CM

## 2024-07-03 DIAGNOSIS — R06.02 SOB (SHORTNESS OF BREATH): ICD-10-CM

## 2024-07-03 DIAGNOSIS — M54.42 CHRONIC MIDLINE LOW BACK PAIN WITH BILATERAL SCIATICA: ICD-10-CM

## 2024-07-03 DIAGNOSIS — F41.9 ANXIETY: ICD-10-CM

## 2024-07-03 DIAGNOSIS — I50.9 CONGESTIVE HEART FAILURE, UNSPECIFIED HF CHRONICITY, UNSPECIFIED HEART FAILURE TYPE (HCC): Primary | ICD-10-CM

## 2024-07-03 RX ORDER — LANCETS 30 GAUGE
1 EACH MISCELLANEOUS 3 TIMES DAILY
Qty: 600 EACH | Refills: 1 | Status: SHIPPED | OUTPATIENT
Start: 2024-07-03

## 2024-07-03 RX ORDER — GLUCOSAMINE HCL/CHONDROITIN SU 500-400 MG
CAPSULE ORAL
Qty: 100 STRIP | Refills: 5 | Status: SHIPPED | OUTPATIENT
Start: 2024-07-03

## 2024-07-03 RX ORDER — BLOOD-GLUCOSE METER
1 KIT MISCELLANEOUS DAILY
Qty: 1 KIT | Refills: 0 | Status: SHIPPED | OUTPATIENT
Start: 2024-07-03

## 2024-07-03 RX ORDER — TORSEMIDE 20 MG/1
20 TABLET ORAL DAILY
Qty: 90 TABLET | Refills: 1 | Status: SHIPPED | OUTPATIENT
Start: 2024-07-03

## 2024-07-03 RX ORDER — VENLAFAXINE HYDROCHLORIDE 37.5 MG/1
37.5 CAPSULE, EXTENDED RELEASE ORAL DAILY
COMMUNITY
Start: 2024-07-01 | End: 2024-07-31

## 2024-07-03 RX ORDER — MIDODRINE HYDROCHLORIDE 5 MG/1
5 TABLET ORAL
COMMUNITY
Start: 2024-07-01 | End: 2024-07-15

## 2024-07-03 RX ORDER — APIXABAN 5 MG/1
5 TABLET, FILM COATED ORAL 2 TIMES DAILY
COMMUNITY
Start: 2024-06-04

## 2024-07-03 SDOH — ECONOMIC STABILITY: FOOD INSECURITY: WITHIN THE PAST 12 MONTHS, THE FOOD YOU BOUGHT JUST DIDN'T LAST AND YOU DIDN'T HAVE MONEY TO GET MORE.: NEVER TRUE

## 2024-07-03 SDOH — ECONOMIC STABILITY: INCOME INSECURITY: HOW HARD IS IT FOR YOU TO PAY FOR THE VERY BASICS LIKE FOOD, HOUSING, MEDICAL CARE, AND HEATING?: NOT HARD AT ALL

## 2024-07-03 SDOH — ECONOMIC STABILITY: FOOD INSECURITY: WITHIN THE PAST 12 MONTHS, YOU WORRIED THAT YOUR FOOD WOULD RUN OUT BEFORE YOU GOT MONEY TO BUY MORE.: NEVER TRUE

## 2024-07-03 ASSESSMENT — ENCOUNTER SYMPTOMS: BACK PAIN: 1

## 2024-07-03 NOTE — TELEPHONE ENCOUNTER
Refill request received from pharmacy      Next Office Visit Date:  Future Appointments   Date Time Provider Department Center   7/3/2024  3:30 PM Sarah Logan MD [facility] [place] [facility] - Please review via PDMP        Last Office Visit:    4/9/2024

## 2024-07-03 NOTE — PROGRESS NOTES
SUBJECTIVE:    Patient ID: Jorge Resendez is a 64 y.o. male.    Chief Complaint   Patient presents with    Follow-Up from Hospital     Cellulitis        HPI: office visit  He has been released from the hospital yesterday.  He is had quite a bit of issues.  He is been really sick.  He has had to be in the ICU.  He is at 1 point been on what they felt was going to be palliative care.  His blood pressure still running a little low.  His blood sugars are definitely better than they were.  He is been not eating quite as much as usual.  He is still having quite a bit of fatigue.  He is still really weak.  His wife is concerned about getting him in the house.  They need a ramp and a wheelchair.  He is unable to walk on his own.  She does have some help from their daughters at home.  He is not having any significant chest pain.  He is not having any significant shortness of breath.  He denies any medication problems that he is able to tell at this time.  Review of Systems   Musculoskeletal:  Positive for back pain and gait problem.   All other systems reviewed and are negative.       OBJECTIVE:  /60   Pulse 98   Temp 97.8 °F (36.6 °C) (Infrared)   Resp 18   Ht 1.803 m (5' 11\")   Wt 103.5 kg (228 lb 3.2 oz)   SpO2 95% Comment: ra  BMI 31.83 kg/m²    Wt Readings from Last 3 Encounters:   07/03/24 103.5 kg (228 lb 3.2 oz)   04/09/24 107.4 kg (236 lb 12.8 oz)   01/09/24 106.7 kg (235 lb 3.2 oz)     BP Readings from Last 3 Encounters:   07/03/24 108/60   04/09/24 132/74   01/09/24 132/84      Pulse Readings from Last 3 Encounters:   07/03/24 98   04/09/24 89   01/09/24 98     Body mass index is 31.83 kg/m².   Resp Readings from Last 3 Encounters:   07/03/24 18   04/09/24 16   01/09/24 16     Past medical, surgical, family and social history were reviewed and updated with the patient.     Physical Exam  Vitals and nursing note reviewed.   Constitutional:       Appearance: He is well-developed.   HENT:      Head:

## 2024-07-03 NOTE — PROGRESS NOTES
Chief Complaint   Patient presents with    Follow-Up from Hospital     Cellulitis        Have you seen any other physician or provider since your last visit yes - hosp    Have you had any other diagnostic tests since your last visit? no    Have you changed or stopped any medications since your last visit? yes - see list

## 2024-07-03 NOTE — TELEPHONE ENCOUNTER
Home health called and stated that they started to see Jorge today. He was recently in the hospital for cellulitis. She stated that there isn't any open wounds on his legs but wanted to let you know that if you wanted to order anything for it to let her know.

## 2024-07-03 NOTE — CARE COORDINATION
Care Transitions Initial Follow Up Call    Call within 2 business days of discharge: Yes     Patient: Jorge Resendez Patient : 1960 MRN: [id number]    Last Discharge Facility       None            RARS: No data recorded     Spoke with: Attempting HFU call, unsuccessful.  Message left with contact information.     Discharge department/facility: [facility]    Non-face-to-face services provided:  Scheduled appointment with PCP-Doreen  Obtained and reviewed discharge summary and/or continuity of care documents    Follow Up  Future Appointments   Date Time Provider Department Center   7/3/2024  3:30 PM Sarah Logan MD [facility]       Vincent Montemayor RN

## 2024-07-03 NOTE — TELEPHONE ENCOUNTER
Home health called and stated Jorge doesn't have a glucometer that works. Can you send this for him?

## 2024-07-15 ENCOUNTER — OFFICE VISIT (OUTPATIENT)
Dept: FAMILY MEDICINE CLINIC | Age: 64
End: 2024-07-15
Payer: MEDICARE

## 2024-07-15 ENCOUNTER — HOSPITAL ENCOUNTER (OUTPATIENT)
Facility: HOSPITAL | Age: 64
Discharge: HOME | End: 2024-07-15
Payer: MEDICARE

## 2024-07-15 VITALS
OXYGEN SATURATION: 95 % | TEMPERATURE: 98 F | SYSTOLIC BLOOD PRESSURE: 104 MMHG | DIASTOLIC BLOOD PRESSURE: 62 MMHG | HEIGHT: 71 IN | BODY MASS INDEX: 31.33 KG/M2 | RESPIRATION RATE: 16 BRPM | WEIGHT: 223.8 LBS | HEART RATE: 77 BPM

## 2024-07-15 DIAGNOSIS — I48.91 ATRIAL FIBRILLATION, UNSPECIFIED TYPE (HCC): ICD-10-CM

## 2024-07-15 DIAGNOSIS — Z94.4 HISTORY OF LIVER TRANSPLANT (HCC): ICD-10-CM

## 2024-07-15 DIAGNOSIS — M54.42 CHRONIC MIDLINE LOW BACK PAIN WITH BILATERAL SCIATICA: ICD-10-CM

## 2024-07-15 DIAGNOSIS — N17.9 ACUTE RENAL FAILURE SUPERIMPOSED ON CHRONIC KIDNEY DISEASE, UNSPECIFIED ACUTE RENAL FAILURE TYPE, UNSPECIFIED CKD STAGE (HCC): ICD-10-CM

## 2024-07-15 DIAGNOSIS — F41.9 ANXIETY: ICD-10-CM

## 2024-07-15 DIAGNOSIS — G89.29 CHRONIC MIDLINE LOW BACK PAIN WITH BILATERAL SCIATICA: ICD-10-CM

## 2024-07-15 DIAGNOSIS — B35.1 TOENAIL FUNGUS: ICD-10-CM

## 2024-07-15 DIAGNOSIS — I10 ESSENTIAL HYPERTENSION: ICD-10-CM

## 2024-07-15 DIAGNOSIS — E11.8 TYPE 2 DIABETES MELLITUS WITH COMPLICATION (HCC): Primary | ICD-10-CM

## 2024-07-15 DIAGNOSIS — M19.90 ARTHRITIS: ICD-10-CM

## 2024-07-15 DIAGNOSIS — N18.9 ACUTE RENAL FAILURE SUPERIMPOSED ON CHRONIC KIDNEY DISEASE, UNSPECIFIED ACUTE RENAL FAILURE TYPE, UNSPECIFIED CKD STAGE (HCC): ICD-10-CM

## 2024-07-15 DIAGNOSIS — M54.41 CHRONIC MIDLINE LOW BACK PAIN WITH BILATERAL SCIATICA: ICD-10-CM

## 2024-07-15 PROCEDURE — 83036 HEMOGLOBIN GLYCOSYLATED A1C: CPT

## 2024-07-15 PROCEDURE — 83010 ASSAY OF HAPTOGLOBIN QUANT: CPT

## 2024-07-15 PROCEDURE — 84681 ASSAY OF C-PEPTIDE: CPT

## 2024-07-15 PROCEDURE — 82728 ASSAY OF FERRITIN: CPT

## 2024-07-15 PROCEDURE — 84443 ASSAY THYROID STIM HORMONE: CPT

## 2024-07-15 PROCEDURE — 3074F SYST BP LT 130 MM HG: CPT | Performed by: FAMILY MEDICINE

## 2024-07-15 PROCEDURE — 3078F DIAST BP <80 MM HG: CPT | Performed by: FAMILY MEDICINE

## 2024-07-15 PROCEDURE — 85025 COMPLETE CBC W/AUTO DIFF WBC: CPT

## 2024-07-15 PROCEDURE — 80053 COMPREHEN METABOLIC PANEL: CPT

## 2024-07-15 PROCEDURE — 84550 ASSAY OF BLOOD/URIC ACID: CPT

## 2024-07-15 PROCEDURE — 83735 ASSAY OF MAGNESIUM: CPT

## 2024-07-15 PROCEDURE — 83615 LACTATE (LD) (LDH) ENZYME: CPT

## 2024-07-15 PROCEDURE — 83540 ASSAY OF IRON: CPT

## 2024-07-15 PROCEDURE — 99214 OFFICE O/P EST MOD 30 MIN: CPT | Performed by: FAMILY MEDICINE

## 2024-07-15 PROCEDURE — 82746 ASSAY OF FOLIC ACID SERUM: CPT

## 2024-07-15 PROCEDURE — 82977 ASSAY OF GGT: CPT

## 2024-07-15 PROCEDURE — 3044F HG A1C LEVEL LT 7.0%: CPT | Performed by: FAMILY MEDICINE

## 2024-07-15 PROCEDURE — 36415 COLL VENOUS BLD VENIPUNCTURE: CPT

## 2024-07-15 PROCEDURE — 86341 ISLET CELL ANTIBODY: CPT

## 2024-07-15 PROCEDURE — 83550 IRON BINDING TEST: CPT

## 2024-07-15 PROCEDURE — 82607 VITAMIN B-12: CPT

## 2024-07-15 RX ORDER — OXYCODONE AND ACETAMINOPHEN 10; 325 MG/1; MG/1
1 TABLET ORAL EVERY 6 HOURS PRN
Qty: 90 TABLET | Refills: 0 | Status: SHIPPED | OUTPATIENT
Start: 2024-07-15 | End: 2024-08-14

## 2024-07-15 RX ORDER — MIDODRINE HYDROCHLORIDE 5 MG/1
5 TABLET ORAL
Qty: 42 TABLET | Refills: 0 | Status: SHIPPED | OUTPATIENT
Start: 2024-07-15 | End: 2024-07-29

## 2024-07-15 RX ORDER — TACROLIMUS 0.5 MG/1
0.5 CAPSULE ORAL 2 TIMES DAILY
Qty: 60 CAPSULE | Refills: 3 | Status: SHIPPED | OUTPATIENT
Start: 2024-07-15

## 2024-07-15 RX ORDER — VENLAFAXINE HYDROCHLORIDE 75 MG/1
75 CAPSULE, EXTENDED RELEASE ORAL DAILY
Qty: 30 CAPSULE | Refills: 3 | Status: SHIPPED | OUTPATIENT
Start: 2024-07-15

## 2024-07-15 RX ORDER — VENLAFAXINE HYDROCHLORIDE 37.5 MG/1
37.5 CAPSULE, EXTENDED RELEASE ORAL DAILY
Qty: 30 CAPSULE | Refills: 0 | Status: CANCELLED | OUTPATIENT
Start: 2024-07-15 | End: 2024-08-14

## 2024-07-15 RX ORDER — FOLIC ACID/VIT B COMPLEX AND C 0.8 MG
1 TABLET ORAL DAILY
COMMUNITY
End: 2024-07-15 | Stop reason: SDUPTHER

## 2024-07-15 RX ORDER — FOLIC ACID/VIT B COMPLEX AND C 0.8 MG
1 TABLET ORAL DAILY
Qty: 90 TABLET | Refills: 3 | Status: SHIPPED | OUTPATIENT
Start: 2024-07-15

## 2024-07-15 RX ORDER — TACROLIMUS 1 MG/1
1 CAPSULE ORAL 2 TIMES DAILY
Qty: 180 CAPSULE | Refills: 3 | Status: CANCELLED | OUTPATIENT
Start: 2024-07-15

## 2024-07-15 ASSESSMENT — ENCOUNTER SYMPTOMS: BACK PAIN: 1

## 2024-07-15 NOTE — PROGRESS NOTES
Chief Complaint   Patient presents with    cellulities     Follow up       Have you seen any other physician or provider since your last visit Dr. Davide [facility]    Have you had any other diagnostic tests since your last visit? no    Have you changed or stopped any medications since your last visit? no     
Filt Rate 15 (L) (7/15/2024) >59 Not in Time Range    Globulin 4.1 (7/15/2024) Not Established g/dL Not in Time Range    Glucose 145 (H) (7/15/2024) 74 - 106 mg/dL Not in Time Range    Potassium 4.9 (7/15/2024) 3.4 - 5.1 mmol/L Not in Time Range    Sodium 136 (7/15/2024) 136 - 145 mmol/L Not in Time Range    Total Bilirubin 0.8 (7/15/2024) 0.3 - 1.2 mg/dL Not in Time Range      Hemoglobin A1C (%)   Date Value   07/15/2024 6.8 (H)     LDL Direct (mg/dL)   Date Value   01/15/2015 53       Lab Results   Component Value Date/Time    WBC 9.1 07/15/2024 02:28 PM    NEUTROABS 6.3 07/15/2024 02:28 PM    HGB 12.5 07/15/2024 02:28 PM    HCT 40.5 07/15/2024 02:28 PM    MCV 90.4 07/15/2024 02:28 PM     07/15/2024 02:28 PM     Lab Results   Component Value Date    TSH 4.21 (H) 07/15/2024       ASSESSMENT/PLAN    Diagnosis Orders   1. Type 2 diabetes mellitus with complication (HCC)  External Referral To Podiatry      2. Atrial fibrillation, unspecified type (HCC)  midodrine (PROAMATINE) 5 MG tablet      3. History of liver transplant (HCC)        4. Anxiety        5. Arthritis  oxyCODONE-acetaminophen (PERCOCET)  MG per tablet      6. Chronic midline low back pain with bilateral sciatica  oxyCODONE-acetaminophen (PERCOCET)  MG per tablet      7. Essential hypertension        8. Acute renal failure superimposed on chronic kidney disease, unspecified acute renal failure type, unspecified CKD stage (HCC)        9. Toenail fungus  External Referral To Podiatry          Orders Placed This Encounter   Medications    B Complex-C-Folic Acid (RENAL-RICHARD) 0.8 MG TABS     Sig: Take 1 tablet by mouth daily     Dispense:  90 tablet     Refill:  3    midodrine (PROAMATINE) 5 MG tablet     Sig: Take 1 tablet by mouth 3 times daily (before meals) for 14 days     Dispense:  42 tablet     Refill:  0    oxyCODONE-acetaminophen (PERCOCET)  MG per tablet     Sig: Take 1 tablet by mouth every 6 hours as needed for Pain for up

## 2024-07-16 LAB
ALBUMIN SERPL-MCNC: 4 G/DL (ref 3.4–4.8)
ALBUMIN/GLOB SERPL: 1 {RATIO} (ref 0.8–2)
ALP SERPL-CCNC: 229 U/L (ref 25–100)
ALT SERPL-CCNC: 33 U/L (ref 4–36)
ANION GAP SERPL CALCULATED.3IONS-SCNC: 13 MMOL/L (ref 3–16)
AST SERPL-CCNC: 41 U/L (ref 8–33)
BASOPHILS # BLD: 0.1 K/UL (ref 0–0.1)
BASOPHILS NFR BLD: 1 %
BILIRUB SERPL-MCNC: 0.8 MG/DL (ref 0.3–1.2)
BUN SERPL-MCNC: 31 MG/DL (ref 6–20)
CALCIUM SERPL-MCNC: 9.5 MG/DL (ref 8.5–10.5)
CHLORIDE SERPL-SCNC: 96 MMOL/L (ref 98–107)
CO2 SERPL-SCNC: 27 MMOL/L (ref 20–30)
CREAT SERPL-MCNC: 4.2 MG/DL (ref 0.4–1.2)
EOSINOPHIL # BLD: 0.1 K/UL (ref 0–0.4)
EOSINOPHIL NFR BLD: 1.5 %
ERYTHROCYTE [DISTWIDTH] IN BLOOD BY AUTOMATED COUNT: 17.3 % (ref 11–16)
FERRITIN SERPL IA-MCNC: 261.7 NG/ML (ref 22–322)
FOLATE SERPL-MCNC: >20 NG/ML
GFR SERPLBLD CREATININE-BSD FMLA CKD-EPI: 15 ML/MIN/{1.73_M2}
GLOBULIN SER CALC-MCNC: 4.1 G/DL
GLUCOSE SERPL-MCNC: 145 MG/DL (ref 74–106)
HBA1C MFR BLD: 6.8 %
HCT VFR BLD AUTO: 40.5 % (ref 40–54)
HGB BLD-MCNC: 12.5 G/DL (ref 13–18)
IMM GRANULOCYTES # BLD: 0.1 K/UL
IMM GRANULOCYTES NFR BLD: 0.5 % (ref 0–5)
IRON SATN MFR SERPL: 20 % (ref 20–50)
IRON SERPL-MCNC: 48 UG/DL (ref 59–158)
LYMPHOCYTES # BLD: 1.9 K/UL (ref 1.5–4)
LYMPHOCYTES NFR BLD: 20.4 %
MAGNESIUM SERPL-MCNC: 2.1 MG/DL (ref 1.7–2.4)
MCH RBC QN AUTO: 27.9 PG (ref 27–32)
MCHC RBC AUTO-ENTMCNC: 30.9 G/DL (ref 31–35)
MCV RBC AUTO: 90.4 FL (ref 80–100)
MONOCYTES # BLD: 0.7 K/UL (ref 0.2–0.8)
MONOCYTES NFR BLD: 8.1 %
NEUTROPHILS # BLD: 6.3 K/UL (ref 2–7.5)
NEUTS SEG NFR BLD: 68.5 %
PLATELET # BLD AUTO: 137 K/UL (ref 150–400)
PMV BLD AUTO: 11.2 FL (ref 6–10)
POTASSIUM SERPL-SCNC: 4.9 MMOL/L (ref 3.4–5.1)
PROT SERPL-MCNC: 8.1 G/DL (ref 6.4–8.3)
RBC # BLD AUTO: 4.48 M/UL (ref 4.5–6)
SODIUM SERPL-SCNC: 136 MMOL/L (ref 136–145)
TIBC SERPL-MCNC: 245 UG/DL (ref 250–450)
TSH SERPL DL<=0.005 MIU/L-ACNC: 4.21 UIU/ML (ref 0.27–4.2)
URATE SERPL-MCNC: 6.5 MG/DL (ref 3.7–8.6)
VIT B12 SERPL-MCNC: 693 PG/ML (ref 211–911)
WBC # BLD AUTO: 9.1 K/UL (ref 4–11)

## 2024-07-17 LAB
GGT SERPL-CCNC: 303 U/L (ref 8–61)
HAPTOGLOB SERPL-MCNC: 134 MG/DL (ref 30–200)
LDH SERPL L TO P-CCNC: 193 U/L (ref 100–190)

## 2024-07-18 LAB — C PEPTIDE SERPL-MCNC: 10.5 NG/ML (ref 1.1–4.4)

## 2024-07-19 LAB — GAD65 AB SER IA-ACNC: <5 IU/ML (ref 0–5)

## 2024-08-07 ENCOUNTER — OFFICE VISIT (OUTPATIENT)
Dept: FAMILY MEDICINE CLINIC | Age: 64
End: 2024-08-07

## 2024-08-07 VITALS
WEIGHT: 218.6 LBS | BODY MASS INDEX: 30.6 KG/M2 | HEART RATE: 100 BPM | TEMPERATURE: 97.5 F | SYSTOLIC BLOOD PRESSURE: 112 MMHG | DIASTOLIC BLOOD PRESSURE: 64 MMHG | OXYGEN SATURATION: 91 % | HEIGHT: 71 IN | RESPIRATION RATE: 18 BRPM

## 2024-08-07 DIAGNOSIS — E03.9 HYPOTHYROIDISM, UNSPECIFIED TYPE: ICD-10-CM

## 2024-08-07 DIAGNOSIS — Z94.4 HISTORY OF LIVER TRANSPLANT (HCC): ICD-10-CM

## 2024-08-07 DIAGNOSIS — E11.8 TYPE 2 DIABETES MELLITUS WITH COMPLICATION (HCC): ICD-10-CM

## 2024-08-07 DIAGNOSIS — Z00.00 MEDICARE ANNUAL WELLNESS VISIT, SUBSEQUENT: Primary | ICD-10-CM

## 2024-08-07 DIAGNOSIS — I10 ESSENTIAL HYPERTENSION: ICD-10-CM

## 2024-08-07 ASSESSMENT — PATIENT HEALTH QUESTIONNAIRE - PHQ9
SUM OF ALL RESPONSES TO PHQ QUESTIONS 1-9: 0
SUM OF ALL RESPONSES TO PHQ QUESTIONS 1-9: 0
2. FEELING DOWN, DEPRESSED OR HOPELESS: NOT AT ALL
SUM OF ALL RESPONSES TO PHQ QUESTIONS 1-9: 0
1. LITTLE INTEREST OR PLEASURE IN DOING THINGS: NOT AT ALL
SUM OF ALL RESPONSES TO PHQ9 QUESTIONS 1 & 2: 0
SUM OF ALL RESPONSES TO PHQ QUESTIONS 1-9: 0

## 2024-08-07 NOTE — PROGRESS NOTES
Medicare Annual Wellness Visit    Jorge Resendez is here for Medicare AWV    Assessment & Plan   Medicare annual wellness visit, subsequent  History of liver transplant (HCC)  -     TACROLIMUS LEVEL; Future  -     Gamma GT; Future  Essential hypertension  -     CBC; Future  -     Comprehensive Metabolic Panel; Future  Type 2 diabetes mellitus with complication (HCC)  Hypothyroidism, unspecified type  -     TSH; Future    Recommendations for Preventive Services Due: see orders and patient instructions/AVS.  Recommended screening schedule for the next 5-10 years is provided to the patient in written form: see Patient Instructions/AVS.     Return in 1 year (on 8/7/2025) for Medicare AWV.     Subjective   The following acute and/or chronic problems were also addressed today: Still struggling quite a bit with his weakness.  He is getting a little stronger than he was.  He is eating a little better.  He is using a rolling walker all the time.  His wife says he is seeming to feel a little better.  He does have follow-up.  He has not had any chest pain.  He denies any significant shortness of breath.  He is needing to follow back up with transplant.        Patient's complete Health Risk Assessment and screening values have been reviewed and are found in Flowsheets. The following problems were reviewed today and where indicated follow up appointments were made and/or referrals ordered.    Positive Risk Factor Screenings with Interventions:    Fall Risk:  Do you feel unsteady or are you worried about falling? : (!) yes  2 or more falls in past year?: (!) yes  Fall with injury in past year?: no     Interventions:    Reviewed medications, home hazards, visual acuity, and co-morbidities that can increase risk for falls        Controlled Medication Review:      Today's Pain Level: No data recorded     Opioid Risk: (High risk score ?55) Opioid risk score: 71      Last PDMP Christian as Reviewed:  Review User Review Instant Review

## 2024-08-07 NOTE — PATIENT INSTRUCTIONS
you are having problems. It's also a good idea to know your test results and keep a list of the medicines you take.  How can you care for yourself at home?  Set realistic goals. Many people expect to lose much more weight than is likely. A weight loss of 5% to 10% of your body weight may be enough to improve your health.  Get family and friends involved to provide support. Talk to them about why you are trying to lose weight, and ask them to help. They can help by participating in exercise and having meals with you, even if they may be eating something different.  Find what works best for you. If you do not have time or do not like to cook, a program that offers meal replacement bars or shakes may be better for you. Or if you like to prepare meals, finding a plan that includes daily menus and recipes may be best.  Ask your doctor about other health professionals who can help you achieve your weight loss goals.  A dietitian can help you make healthy changes in your diet.  An exercise specialist or  can help you develop a safe and effective exercise program.  A counselor or psychiatrist can help you cope with issues such as depression, anxiety, or family problems that can make it hard to focus on weight loss.  Consider joining a support group for people who are trying to lose weight. Your doctor can suggest groups in your area.  Where can you learn more?  Go to https://www.[employer].net/patientEd and enter U357 to learn more about \"Starting a Weight Loss Plan: Care Instructions.\"  Current as of: September 20, 2023  Content Version: 14.1  © 2006-2024 [employer].   Care instructions adapted under license by [employer]. If you have questions about a medical condition or this instruction, always ask your healthcare professional. [employer] disclaims any warranty or liability for your use of this information.           Advance Directives: Care Instructions  Overview  An

## 2024-08-07 NOTE — PROGRESS NOTES
Chief Complaint   Patient presents with    Medicare AWV       Have you seen any other physician or provider since your last visit yes -  Dr. Augustine, dialysis, home health     Have you had any other diagnostic tests since your last visit? no    Have you changed or stopped any medications since your last visit? no

## 2024-08-12 ENCOUNTER — TELEPHONE (OUTPATIENT)
Dept: FAMILY MEDICINE CLINIC | Age: 64
End: 2024-08-12

## 2024-08-12 NOTE — TELEPHONE ENCOUNTER
Teresa from [facility] called stating she got no answer or return call from Stephanie

## 2024-09-14 DIAGNOSIS — I50.9 ACUTE ON CHRONIC CONGESTIVE HEART FAILURE, UNSPECIFIED HEART FAILURE TYPE (HCC): ICD-10-CM

## 2024-09-16 ENCOUNTER — OFFICE VISIT (OUTPATIENT)
Dept: FAMILY MEDICINE CLINIC | Age: 64
End: 2024-09-16
Payer: MEDICARE

## 2024-09-16 VITALS
WEIGHT: 212.6 LBS | SYSTOLIC BLOOD PRESSURE: 136 MMHG | DIASTOLIC BLOOD PRESSURE: 80 MMHG | OXYGEN SATURATION: 96 % | BODY MASS INDEX: 29.76 KG/M2 | TEMPERATURE: 98.1 F | HEART RATE: 102 BPM | HEIGHT: 71 IN | RESPIRATION RATE: 16 BRPM

## 2024-09-16 DIAGNOSIS — I10 ESSENTIAL HYPERTENSION: ICD-10-CM

## 2024-09-16 DIAGNOSIS — N18.4 CKD (CHRONIC KIDNEY DISEASE) STAGE 4, GFR 15-29 ML/MIN (HCC): ICD-10-CM

## 2024-09-16 DIAGNOSIS — E11.8 TYPE 2 DIABETES MELLITUS WITH COMPLICATION (HCC): Primary | ICD-10-CM

## 2024-09-16 DIAGNOSIS — I25.119 ATHEROSCLEROSIS OF NATIVE CORONARY ARTERY OF NATIVE HEART WITH ANGINA PECTORIS (HCC): ICD-10-CM

## 2024-09-16 DIAGNOSIS — I20.9 ANGINA PECTORIS, UNSPECIFIED (HCC): ICD-10-CM

## 2024-09-16 DIAGNOSIS — I48.91 ATRIAL FIBRILLATION, UNSPECIFIED TYPE (HCC): ICD-10-CM

## 2024-09-16 DIAGNOSIS — D68.69 SECONDARY HYPERCOAGULABLE STATE (HCC): ICD-10-CM

## 2024-09-16 PROCEDURE — 99214 OFFICE O/P EST MOD 30 MIN: CPT | Performed by: FAMILY MEDICINE

## 2024-09-16 PROCEDURE — 3079F DIAST BP 80-89 MM HG: CPT | Performed by: FAMILY MEDICINE

## 2024-09-16 PROCEDURE — 3075F SYST BP GE 130 - 139MM HG: CPT | Performed by: FAMILY MEDICINE

## 2024-09-16 PROCEDURE — 3044F HG A1C LEVEL LT 7.0%: CPT | Performed by: FAMILY MEDICINE

## 2024-09-16 RX ORDER — SPIRONOLACTONE 25 MG/1
25 TABLET ORAL DAILY
Qty: 90 TABLET | Refills: 3 | OUTPATIENT
Start: 2024-09-16

## 2024-10-21 ENCOUNTER — HOSPITAL ENCOUNTER (OUTPATIENT)
Facility: HOSPITAL | Age: 64
Discharge: HOME | End: 2024-10-21
Payer: MEDICARE

## 2024-10-21 ENCOUNTER — OFFICE VISIT (OUTPATIENT)
Dept: FAMILY MEDICINE CLINIC | Age: 64
End: 2024-10-21
Payer: MEDICARE

## 2024-10-21 VITALS
OXYGEN SATURATION: 98 % | WEIGHT: 209.4 LBS | DIASTOLIC BLOOD PRESSURE: 80 MMHG | HEART RATE: 84 BPM | TEMPERATURE: 97.9 F | SYSTOLIC BLOOD PRESSURE: 138 MMHG | RESPIRATION RATE: 16 BRPM | BODY MASS INDEX: 29.31 KG/M2 | HEIGHT: 71 IN

## 2024-10-21 DIAGNOSIS — M54.41 CHRONIC MIDLINE LOW BACK PAIN WITH BILATERAL SCIATICA: ICD-10-CM

## 2024-10-21 DIAGNOSIS — I10 ESSENTIAL HYPERTENSION: ICD-10-CM

## 2024-10-21 DIAGNOSIS — M54.42 CHRONIC MIDLINE LOW BACK PAIN WITH BILATERAL SCIATICA: ICD-10-CM

## 2024-10-21 DIAGNOSIS — E11.8 TYPE 2 DIABETES MELLITUS WITH COMPLICATION (HCC): ICD-10-CM

## 2024-10-21 DIAGNOSIS — R25.2 LEG CRAMPS: ICD-10-CM

## 2024-10-21 DIAGNOSIS — G89.29 CHRONIC MIDLINE LOW BACK PAIN WITH BILATERAL SCIATICA: ICD-10-CM

## 2024-10-21 DIAGNOSIS — N18.4 CKD (CHRONIC KIDNEY DISEASE) STAGE 4, GFR 15-29 ML/MIN (HCC): ICD-10-CM

## 2024-10-21 DIAGNOSIS — M19.90 ARTHRITIS: ICD-10-CM

## 2024-10-21 DIAGNOSIS — E11.8 TYPE 2 DIABETES MELLITUS WITH COMPLICATION (HCC): Primary | ICD-10-CM

## 2024-10-21 LAB
ALBUMIN SERPL-MCNC: 4.2 G/DL (ref 3.4–4.8)
ALBUMIN/GLOB SERPL: 1.1 {RATIO} (ref 0.8–2)
ALP SERPL-CCNC: 297 U/L (ref 25–100)
ALT SERPL-CCNC: 52 U/L (ref 4–36)
ANION GAP SERPL CALCULATED.3IONS-SCNC: 13 MMOL/L (ref 3–16)
AST SERPL-CCNC: 52 U/L (ref 8–33)
BILIRUB SERPL-MCNC: 0.7 MG/DL (ref 0.3–1.2)
BUN SERPL-MCNC: 46 MG/DL (ref 6–20)
CALCIUM SERPL-MCNC: 9.9 MG/DL (ref 8.5–10.5)
CHLORIDE SERPL-SCNC: 93 MMOL/L (ref 98–107)
CO2 SERPL-SCNC: 24 MMOL/L (ref 20–30)
CREAT SERPL-MCNC: 3.3 MG/DL (ref 0.4–1.2)
ERYTHROCYTE [DISTWIDTH] IN BLOOD BY AUTOMATED COUNT: 16.3 % (ref 11–16)
GFR SERPLBLD CREATININE-BSD FMLA CKD-EPI: 20 ML/MIN/{1.73_M2}
GGT SERPL-CCNC: 511 U/L (ref 8–61)
GLOBULIN SER CALC-MCNC: 3.7 G/DL
GLUCOSE SERPL-MCNC: 179 MG/DL (ref 74–106)
HBA1C MFR BLD: 7.5 %
HCT VFR BLD AUTO: 39.4 % (ref 40–54)
HGB BLD-MCNC: 13.3 G/DL (ref 13–18)
MAGNESIUM SERPL-MCNC: 2.1 MG/DL (ref 1.7–2.4)
MCH RBC QN AUTO: 29.8 PG (ref 27–32)
MCHC RBC AUTO-ENTMCNC: 33.8 G/DL (ref 31–35)
MCV RBC AUTO: 88.3 FL (ref 80–100)
PLATELET # BLD AUTO: 120 K/UL (ref 150–400)
PMV BLD AUTO: 9.8 FL (ref 6–10)
POTASSIUM SERPL-SCNC: 5.8 MMOL/L (ref 3.4–5.1)
PROT SERPL-MCNC: 7.9 G/DL (ref 6.4–8.3)
RBC # BLD AUTO: 4.46 M/UL (ref 4.5–6)
SODIUM SERPL-SCNC: 130 MMOL/L (ref 136–145)
WBC # BLD AUTO: 9.4 K/UL (ref 4–11)

## 2024-10-21 PROCEDURE — 83735 ASSAY OF MAGNESIUM: CPT

## 2024-10-21 PROCEDURE — 3079F DIAST BP 80-89 MM HG: CPT | Performed by: FAMILY MEDICINE

## 2024-10-21 PROCEDURE — 82977 ASSAY OF GGT: CPT

## 2024-10-21 PROCEDURE — 99214 OFFICE O/P EST MOD 30 MIN: CPT | Performed by: FAMILY MEDICINE

## 2024-10-21 PROCEDURE — 85027 COMPLETE CBC AUTOMATED: CPT

## 2024-10-21 PROCEDURE — 80053 COMPREHEN METABOLIC PANEL: CPT

## 2024-10-21 PROCEDURE — 82330 ASSAY OF CALCIUM: CPT

## 2024-10-21 PROCEDURE — 83036 HEMOGLOBIN GLYCOSYLATED A1C: CPT

## 2024-10-21 PROCEDURE — 3075F SYST BP GE 130 - 139MM HG: CPT | Performed by: FAMILY MEDICINE

## 2024-10-21 PROCEDURE — 3051F HG A1C>EQUAL 7.0%<8.0%: CPT | Performed by: FAMILY MEDICINE

## 2024-10-21 RX ORDER — NITROGLYCERIN 0.4 MG/1
0.4 TABLET SUBLINGUAL EVERY 5 MIN PRN
Qty: 25 TABLET | Refills: 3 | Status: SHIPPED | OUTPATIENT
Start: 2024-10-21

## 2024-10-21 RX ORDER — OXYCODONE AND ACETAMINOPHEN 10; 325 MG/1; MG/1
1 TABLET ORAL EVERY 6 HOURS PRN
Qty: 120 TABLET | Refills: 0 | Status: SHIPPED | OUTPATIENT
Start: 2024-10-21 | End: 2024-11-20

## 2024-10-21 NOTE — PROGRESS NOTES
\"Have you been to the ER, urgent care clinic since your last visit?  Hospitalized since your last visit?\"    NO    “Have you seen or consulted any other health care providers outside our system since your last visit?”    Yes  Dr. Olson [facility]      “Have you had a colorectal cancer screening such as a colonoscopy/FIT/Cologuard?    NO    No colonoscopy on file  No cologuard on file  Date of last FIT: 8/25/2016   No flexible sigmoidoscopy on file     “Have you had a diabetic eye exam?”    NO     Date of last diabetic eye exam: 12/12/2014          
Referral to Care Management      2. CKD (chronic kidney disease) stage 4, GFR 15-29 ml/min ([facility])  Ambulatory Referral to Care Management      3. Essential hypertension  Comprehensive Metabolic Panel    CBC    Ambulatory Referral to Care Management      4. Leg cramps  Magnesium    CALCIUM IONIZED SERUM      5. Arthritis  oxyCODONE-acetaminophen (PERCOCET)  MG per tablet      6. Chronic midline low back pain with bilateral sciatica  oxyCODONE-acetaminophen (PERCOCET)  MG per tablet          Orders Placed This Encounter   Medications    nitroGLYCERIN (NITROSTAT) 0.4 MG SL tablet     Sig: Place 1 tablet under the tongue every 5 minutes as needed for Chest pain up to max of 3 total doses. If no relief after 1 dose, call 911.     Dispense:  25 tablet     Refill:  3    oxyCODONE-acetaminophen (PERCOCET)  MG per tablet     Sig: Take 1 tablet by mouth every 6 hours as needed for Pain for up to 30 days.     Dispense:  120 tablet     Refill:  0     Reduce doses taken as pain becomes manageable        Medications Discontinued During This Encounter   Medication Reason    tiotropium (SPIRIVA RESPIMAT) 2.5 MCG/ACT AERS inhaler LIST CLEANUP    torsemide (DEMADEX) 20 MG tablet LIST CLEANUP    nitroGLYCERIN (NITROSTAT) 0.4 MG SL tablet     oxyCODONE-acetaminophen (PERCOCET)  MG per tablet REORDER       Controlled Substances Monitoring:      Please note: This chart was generated using Dragon dictation software. Although every effort was made to ensure the accuracy of this automated transcription, some errors in transcription may have occurred.

## 2024-10-23 LAB
CA-I ADJ PH7.4 SERPL-SCNC: 1.2 MMOL/L (ref 1.09–1.3)
CA-I SERPL ISE-SCNC: 1.25 MMOL/L (ref 1.09–1.3)

## 2024-11-11 RX ORDER — TACROLIMUS 0.5 MG/1
0.5 CAPSULE ORAL 2 TIMES DAILY
Qty: 180 CAPSULE | Refills: 3 | Status: SHIPPED | OUTPATIENT
Start: 2024-11-11

## 2024-11-11 RX ORDER — VENLAFAXINE HYDROCHLORIDE 75 MG/1
75 CAPSULE, EXTENDED RELEASE ORAL DAILY
Qty: 30 CAPSULE | Refills: 3 | Status: SHIPPED | OUTPATIENT
Start: 2024-11-11

## 2024-11-11 NOTE — TELEPHONE ENCOUNTER
Refill request received from pharmacy      Next Office Visit Date:  Future Appointments   Date Time Provider Department Center   1/22/2025 10:30 AM Sarah Logan [facility] [place] [facility] - Please review via PDMP        Last Office Visit:    10/21/2024

## 2024-11-22 ENCOUNTER — HOSPITAL ENCOUNTER (OUTPATIENT)
Facility: HOSPITAL | Age: 64
Discharge: HOME | End: 2024-11-22
Payer: MEDICARE

## 2024-11-22 LAB
ALBUMIN SERPL-MCNC: 4.1 G/DL (ref 3.4–4.8)
ALBUMIN/GLOB SERPL: 1.3 {RATIO} (ref 0.8–2)
ALP SERPL-CCNC: 390 U/L (ref 25–100)
ALT SERPL-CCNC: 56 U/L (ref 4–36)
ANION GAP SERPL CALCULATED.3IONS-SCNC: 10 MMOL/L (ref 3–16)
AST SERPL-CCNC: 53 U/L (ref 8–33)
BILIRUB SERPL-MCNC: 0.7 MG/DL (ref 0.3–1.2)
BUN SERPL-MCNC: 30 MG/DL (ref 6–20)
CALCIUM SERPL-MCNC: 9.2 MG/DL (ref 8.5–10.5)
CHLORIDE SERPL-SCNC: 97 MMOL/L (ref 98–107)
CHOLEST SERPL-MCNC: 95 MG/DL (ref 0–200)
CO2 SERPL-SCNC: 28 MMOL/L (ref 20–30)
CREAT SERPL-MCNC: 2.8 MG/DL (ref 0.4–1.2)
ERYTHROCYTE [DISTWIDTH] IN BLOOD BY AUTOMATED COUNT: 16.2 % (ref 11–16)
GFR SERPLBLD CREATININE-BSD FMLA CKD-EPI: 24 ML/MIN/{1.73_M2}
GLOBULIN SER CALC-MCNC: 3.2 G/DL
GLUCOSE SERPL-MCNC: 168 MG/DL (ref 74–106)
HCT VFR BLD AUTO: 39.3 % (ref 40–54)
HDLC SERPL-MCNC: 41 MG/DL (ref 40–60)
HGB BLD-MCNC: 12.5 G/DL (ref 13–18)
LDLC SERPL CALC-MCNC: 34 MG/DL
MAGNESIUM SERPL-MCNC: 2.2 MG/DL (ref 1.7–2.4)
MCH RBC QN AUTO: 29.8 PG (ref 27–32)
MCHC RBC AUTO-ENTMCNC: 31.8 G/DL (ref 31–35)
MCV RBC AUTO: 93.8 FL (ref 80–100)
PLATELET # BLD AUTO: 123 K/UL (ref 150–400)
PMV BLD AUTO: 10.5 FL (ref 6–10)
POTASSIUM SERPL-SCNC: 5.5 MMOL/L (ref 3.4–5.1)
PROT SERPL-MCNC: 7.3 G/DL (ref 6.4–8.3)
RBC # BLD AUTO: 4.19 M/UL (ref 4.5–6)
SODIUM SERPL-SCNC: 135 MMOL/L (ref 136–145)
TRIGL SERPL-MCNC: 101 MG/DL (ref 0–249)
VLDLC SERPL CALC-MCNC: 20 MG/DL
WBC # BLD AUTO: 9.4 K/UL (ref 4–11)

## 2024-11-22 PROCEDURE — 83735 ASSAY OF MAGNESIUM: CPT

## 2024-11-22 PROCEDURE — 80061 LIPID PANEL: CPT

## 2024-11-22 PROCEDURE — 85027 COMPLETE CBC AUTOMATED: CPT

## 2024-11-22 PROCEDURE — 82105 ALPHA-FETOPROTEIN SERUM: CPT

## 2024-11-22 PROCEDURE — 82977 ASSAY OF GGT: CPT

## 2024-11-22 PROCEDURE — 86803 HEPATITIS C AB TEST: CPT

## 2024-11-22 PROCEDURE — 36415 COLL VENOUS BLD VENIPUNCTURE: CPT

## 2024-11-22 PROCEDURE — 80053 COMPREHEN METABOLIC PANEL: CPT

## 2024-11-23 LAB — GGT SERPL-CCNC: 499 U/L (ref 8–61)

## 2024-11-24 LAB — HCV AB SERPL QL IA: REACTIVE

## 2024-11-25 ENCOUNTER — HOSPITAL ENCOUNTER (OUTPATIENT)
Facility: HOSPITAL | Age: 64
Discharge: HOME | End: 2024-11-25
Payer: MEDICARE

## 2024-11-25 PROCEDURE — 36415 COLL VENOUS BLD VENIPUNCTURE: CPT

## 2024-11-26 LAB — AFP-TM SERPL-MCNC: <1.8 UG/L

## 2024-12-24 DIAGNOSIS — I48.91 ATRIAL FIBRILLATION, UNSPECIFIED TYPE (HCC): ICD-10-CM

## 2024-12-26 RX ORDER — MIDODRINE HYDROCHLORIDE 5 MG/1
TABLET ORAL
Qty: 42 TABLET | Refills: 0 | OUTPATIENT
Start: 2024-12-26

## 2024-12-26 NOTE — TELEPHONE ENCOUNTER
Refill request received from pharmacy      Next Office Visit Date:  Future Appointments   Date Time Provider Department Center   1/22/2025 10:30 AM Sarah Logan MD [facility] [place] [facility] - Please review via PDMP        Last Office Visit:    10/21/2024

## 2025-01-20 ASSESSMENT — PATIENT HEALTH QUESTIONNAIRE - PHQ9
2. FEELING DOWN, DEPRESSED OR HOPELESS: NOT AT ALL
SUM OF ALL RESPONSES TO PHQ QUESTIONS 1-9: 1
SUM OF ALL RESPONSES TO PHQ9 QUESTIONS 1 & 2: 1
2. FEELING DOWN, DEPRESSED OR HOPELESS: NOT AT ALL
SUM OF ALL RESPONSES TO PHQ9 QUESTIONS 1 & 2: 1
1. LITTLE INTEREST OR PLEASURE IN DOING THINGS: SEVERAL DAYS
SUM OF ALL RESPONSES TO PHQ QUESTIONS 1-9: 1
1. LITTLE INTEREST OR PLEASURE IN DOING THINGS: SEVERAL DAYS

## 2025-01-22 ENCOUNTER — HOSPITAL ENCOUNTER (OUTPATIENT)
Facility: HOSPITAL | Age: 65
Discharge: HOME | End: 2025-01-22
Payer: MEDICARE

## 2025-01-22 ENCOUNTER — OFFICE VISIT (OUTPATIENT)
Age: 65
End: 2025-01-22

## 2025-01-22 VITALS
HEART RATE: 93 BPM | TEMPERATURE: 97.3 F | HEIGHT: 71 IN | OXYGEN SATURATION: 98 % | DIASTOLIC BLOOD PRESSURE: 68 MMHG | BODY MASS INDEX: 27.44 KG/M2 | RESPIRATION RATE: 16 BRPM | SYSTOLIC BLOOD PRESSURE: 116 MMHG | WEIGHT: 196 LBS

## 2025-01-22 DIAGNOSIS — E11.8 TYPE 2 DIABETES MELLITUS WITH COMPLICATION (HCC): Primary | ICD-10-CM

## 2025-01-22 DIAGNOSIS — M54.41 CHRONIC MIDLINE LOW BACK PAIN WITH BILATERAL SCIATICA: ICD-10-CM

## 2025-01-22 DIAGNOSIS — I48.91 ATRIAL FIBRILLATION, UNSPECIFIED TYPE (HCC): ICD-10-CM

## 2025-01-22 DIAGNOSIS — E11.42 DIABETIC POLYNEUROPATHY ASSOCIATED WITH TYPE 2 DIABETES MELLITUS (HCC): ICD-10-CM

## 2025-01-22 DIAGNOSIS — Z94.4 HISTORY OF LIVER TRANSPLANT (HCC): ICD-10-CM

## 2025-01-22 DIAGNOSIS — N18.4 CKD (CHRONIC KIDNEY DISEASE) STAGE 4, GFR 15-29 ML/MIN (HCC): ICD-10-CM

## 2025-01-22 DIAGNOSIS — F41.9 ANXIETY: ICD-10-CM

## 2025-01-22 DIAGNOSIS — E03.8 OTHER SPECIFIED HYPOTHYROIDISM: ICD-10-CM

## 2025-01-22 DIAGNOSIS — M54.42 CHRONIC MIDLINE LOW BACK PAIN WITH BILATERAL SCIATICA: ICD-10-CM

## 2025-01-22 DIAGNOSIS — G89.29 CHRONIC MIDLINE LOW BACK PAIN WITH BILATERAL SCIATICA: ICD-10-CM

## 2025-01-22 DIAGNOSIS — I95.9 HYPOTENSION, UNSPECIFIED HYPOTENSION TYPE: ICD-10-CM

## 2025-01-22 DIAGNOSIS — E11.8 TYPE 2 DIABETES MELLITUS WITH COMPLICATION (HCC): ICD-10-CM

## 2025-01-22 DIAGNOSIS — I50.9 CONGESTIVE HEART FAILURE, UNSPECIFIED HF CHRONICITY, UNSPECIFIED HEART FAILURE TYPE (HCC): ICD-10-CM

## 2025-01-22 DIAGNOSIS — M19.90 ARTHRITIS: ICD-10-CM

## 2025-01-22 LAB
ALBUMIN SERPL-MCNC: 3.8 G/DL (ref 3.4–4.8)
ALBUMIN/GLOB SERPL: 1.1 {RATIO} (ref 0.8–2)
ALP SERPL-CCNC: 266 U/L (ref 25–100)
ALT SERPL-CCNC: 46 U/L (ref 4–36)
ANION GAP SERPL CALCULATED.3IONS-SCNC: 14 MMOL/L (ref 3–16)
AST SERPL-CCNC: 37 U/L (ref 8–33)
BILIRUB SERPL-MCNC: 0.5 MG/DL (ref 0.3–1.2)
BILIRUBIN, POC: NORMAL
BLOOD URINE, POC: NORMAL
BUN SERPL-MCNC: 28 MG/DL (ref 6–20)
CALCIUM SERPL-MCNC: 9.1 MG/DL (ref 8.5–10.5)
CHLORIDE SERPL-SCNC: 96 MMOL/L (ref 98–107)
CLARITY, POC: NORMAL
CO2 SERPL-SCNC: 25 MMOL/L (ref 20–30)
COLOR, POC: YELLOW
CREAT SERPL-MCNC: 3.1 MG/DL (ref 0.4–1.2)
ERYTHROCYTE [DISTWIDTH] IN BLOOD BY AUTOMATED COUNT: 15.7 % (ref 11–16)
FERRITIN SERPL IA-MCNC: 145 NG/ML (ref 22–322)
FOLATE SERPL-MCNC: >20 NG/ML
GFR SERPLBLD CREATININE-BSD FMLA CKD-EPI: 22 ML/MIN/{1.73_M2}
GLOBULIN SER CALC-MCNC: 3.6 G/DL
GLUCOSE SERPL-MCNC: 138 MG/DL (ref 74–106)
GLUCOSE URINE, POC: NEGATIVE MG/DL
HBA1C MFR BLD: 7.7 %
HCT VFR BLD AUTO: 37.7 % (ref 40–54)
HGB BLD-MCNC: 12.1 G/DL (ref 13–18)
IRON SERPL-MCNC: 37 UG/DL (ref 59–158)
KETONES, POC: NEGATIVE MG/DL
LEUKOCYTE EST, POC: NORMAL
MAGNESIUM SERPL-MCNC: 2.1 MG/DL (ref 1.7–2.4)
MCH RBC QN AUTO: 28.6 PG (ref 27–32)
MCHC RBC AUTO-ENTMCNC: 32.1 G/DL (ref 31–35)
MCV RBC AUTO: 89.1 FL (ref 80–100)
NITRITE, POC: NEGATIVE
PH, POC: 6
PLATELET # BLD AUTO: 110 K/UL (ref 150–400)
PMV BLD AUTO: 10.4 FL (ref 6–10)
POTASSIUM SERPL-SCNC: 5 MMOL/L (ref 3.4–5.1)
PROT SERPL-MCNC: 7.4 G/DL (ref 6.4–8.3)
PROTEIN, POC: 100 MG/DL
RBC # BLD AUTO: 4.23 M/UL (ref 4.5–6)
SODIUM SERPL-SCNC: 135 MMOL/L (ref 136–145)
SPECIFIC GRAVITY, POC: 1.03
T4 FREE SERPL-MCNC: 1.6 NG/DL (ref 0.92–1.68)
TIBC SERPL-MCNC: 227 UG/DL (ref 250–450)
TSH SERPL DL<=0.005 MIU/L-ACNC: 1.95 UIU/ML (ref 0.27–4.2)
URATE SERPL-MCNC: 4.6 MG/DL (ref 3.7–8.6)
UROBILINOGEN, POC: 0.2 MG/DL
VIT B12 SERPL-MCNC: 868 PG/ML (ref 211–911)
WBC # BLD AUTO: 8.7 K/UL (ref 4–11)

## 2025-01-22 PROCEDURE — 84439 ASSAY OF FREE THYROXINE: CPT

## 2025-01-22 PROCEDURE — 83735 ASSAY OF MAGNESIUM: CPT

## 2025-01-22 PROCEDURE — 84443 ASSAY THYROID STIM HORMONE: CPT

## 2025-01-22 PROCEDURE — 87086 URINE CULTURE/COLONY COUNT: CPT

## 2025-01-22 PROCEDURE — 86337 INSULIN ANTIBODIES: CPT

## 2025-01-22 PROCEDURE — 83550 IRON BINDING TEST: CPT

## 2025-01-22 PROCEDURE — 80197 ASSAY OF TACROLIMUS: CPT

## 2025-01-22 PROCEDURE — 82728 ASSAY OF FERRITIN: CPT

## 2025-01-22 PROCEDURE — 82746 ASSAY OF FOLIC ACID SERUM: CPT

## 2025-01-22 PROCEDURE — 36415 COLL VENOUS BLD VENIPUNCTURE: CPT

## 2025-01-22 PROCEDURE — 83540 ASSAY OF IRON: CPT

## 2025-01-22 PROCEDURE — 86341 ISLET CELL ANTIBODY: CPT

## 2025-01-22 PROCEDURE — 85027 COMPLETE CBC AUTOMATED: CPT

## 2025-01-22 PROCEDURE — 84681 ASSAY OF C-PEPTIDE: CPT

## 2025-01-22 PROCEDURE — 80053 COMPREHEN METABOLIC PANEL: CPT

## 2025-01-22 PROCEDURE — 82607 VITAMIN B-12: CPT

## 2025-01-22 PROCEDURE — 84550 ASSAY OF BLOOD/URIC ACID: CPT

## 2025-01-22 PROCEDURE — 83036 HEMOGLOBIN GLYCOSYLATED A1C: CPT

## 2025-01-22 RX ORDER — CLONAZEPAM 1 MG/1
1 TABLET ORAL 2 TIMES DAILY PRN
Qty: 180 TABLET | Refills: 1 | Status: SHIPPED | OUTPATIENT
Start: 2025-01-22 | End: 2025-07-21

## 2025-01-22 RX ORDER — MIDODRINE HYDROCHLORIDE 5 MG/1
5 TABLET ORAL
Qty: 42 TABLET | Refills: 2 | Status: SHIPPED | OUTPATIENT
Start: 2025-01-22 | End: 2025-02-05

## 2025-01-22 RX ORDER — OXYCODONE AND ACETAMINOPHEN 10; 325 MG/1; MG/1
1 TABLET ORAL EVERY 6 HOURS PRN
Qty: 120 TABLET | Refills: 0 | Status: SHIPPED | OUTPATIENT
Start: 2025-01-22 | End: 2025-02-21

## 2025-01-22 SDOH — ECONOMIC STABILITY: FOOD INSECURITY: WITHIN THE PAST 12 MONTHS, YOU WORRIED THAT YOUR FOOD WOULD RUN OUT BEFORE YOU GOT MONEY TO BUY MORE.: NEVER TRUE

## 2025-01-22 SDOH — ECONOMIC STABILITY: FOOD INSECURITY: WITHIN THE PAST 12 MONTHS, THE FOOD YOU BOUGHT JUST DIDN'T LAST AND YOU DIDN'T HAVE MONEY TO GET MORE.: NEVER TRUE

## 2025-01-22 NOTE — PROGRESS NOTES
\"Have you been to the ER, urgent care clinic since your last visit?  Hospitalized since your last visit?\"    Yes  [facility] ER/Admitted      “Have you seen or consulted any other health care providers outside our system since your last visit?”      Yes  Dr. Eran Olson  [facility]      “Have you had a colorectal cancer screening such as a colonoscopy/FIT/Cologuard?    NO  Refused    No colonoscopy on file  No cologuard on file  Date of last FIT: 8/25/2016   No flexible sigmoidoscopy on file     “Have you had a diabetic eye exam?”    NO     Date of last diabetic eye exam: 12/12/2014

## 2025-01-22 NOTE — PROGRESS NOTES
SUBJECTIVE:    Patient ID: Jorge Resendez is a 64 y.o. male.    Chief Complaint   Patient presents with    Follow-Up from [facility] ER/Admitted due to flu and pneumonia.        HPI: office visit   He has been in the hospital for flu and pneumonia.  He is feeling better.  He is going to have to have a new fistula on the right arm.  He is seeing dr oliver.  He has seen podiatry.  He says his feet are doing ok.  He says the blood sugars are at the highest was 224.  He says he says he couldn't eat anything for 8 days.  He is feeling a little better.  He is taking his medicines pretty regular.  He definitely admits that he has not been good about that.  His blood pressure seem to be doing okay at home.  His wife with him to give part of his history.  He is still coughing a little.  Still having little bit of congestion.  Nothing like he was.  Now what he gets up is clear.  He is got a little more strength.  He continues to be going to dialysis 3 days a week.    Review of Systems   Musculoskeletal:  Positive for back pain and gait problem.   All other systems reviewed and are negative.       OBJECTIVE:  /68   Pulse 93   Temp 97.3 °F (36.3 °C) (Infrared)   Resp 16   Ht 1.803 m (5' 11\")   Wt 88.9 kg (196 lb)   SpO2 98% Comment: ra  BMI 27.34 kg/m²    Wt Readings from Last 3 Encounters:   01/22/25 88.9 kg (196 lb)   10/21/24 95 kg (209 lb 6.4 oz)   09/16/24 96.4 kg (212 lb 9.6 oz)     BP Readings from Last 3 Encounters:   01/22/25 116/68   10/21/24 138/80   09/16/24 136/80      Pulse Readings from Last 3 Encounters:   01/22/25 93   10/21/24 84   09/16/24 (!) 102     Body mass index is 27.34 kg/m².   Resp Readings from Last 3 Encounters:   01/22/25 16   10/21/24 16   09/16/24 16     Past medical, surgical, family and social history were reviewed and updated with the patient.     Physical Exam  Vitals and nursing note reviewed.   Constitutional:       Appearance: He is

## 2025-01-24 LAB
BACTERIA UR CULT: NORMAL
C PEPTIDE SERPL-MCNC: 10.4 NG/ML (ref 1.1–4.4)
TACROLIMUS BLOOD: 3.4 NG/ML (ref 5–20)

## 2025-01-26 LAB — GAD65 AB SER IA-ACNC: 5.2 IU/ML (ref 0–5)

## 2025-01-29 LAB — INSULIN HUMAN AB SER-ACNC: <0.4 U/ML (ref 0–0.4)

## 2025-03-10 RX ORDER — VENLAFAXINE HYDROCHLORIDE 75 MG/1
75 CAPSULE, EXTENDED RELEASE ORAL DAILY
Qty: 30 CAPSULE | Refills: 3 | Status: SHIPPED | OUTPATIENT
Start: 2025-03-10

## 2025-03-10 NOTE — TELEPHONE ENCOUNTER
Refill request received from pharmacy      Next Office Visit Date:  Future Appointments   Date Time Provider Department Center   4/23/2025 10:45 AM [facility] - Please review via PDMP        Last Office Visit:    1/22/2025

## 2025-03-27 DIAGNOSIS — F41.9 ANXIETY: ICD-10-CM

## 2025-03-27 RX ORDER — TRAZODONE HYDROCHLORIDE 150 MG/1
150 TABLET ORAL NIGHTLY
Qty: 90 TABLET | Refills: 3 | Status: SHIPPED | OUTPATIENT
Start: 2025-03-27

## 2025-03-27 RX ORDER — PRAVASTATIN SODIUM 40 MG
40 TABLET ORAL DAILY
Qty: 90 TABLET | Refills: 3 | Status: SHIPPED | OUTPATIENT
Start: 2025-03-27

## 2025-03-27 NOTE — TELEPHONE ENCOUNTER
Refill request received from pharmacy      Next Office Visit Date:  Future Appointments   Date Time Provider Department Center   4/23/2025 10:45 AM [facility] - Please review via PDMP        Last Office Visit:    1/22/2025

## 2025-04-23 ENCOUNTER — HOSPITAL ENCOUNTER (OUTPATIENT)
Facility: HOSPITAL | Age: 65
Discharge: HOME | End: 2025-04-23
Payer: MEDICARE

## 2025-04-23 ENCOUNTER — OFFICE VISIT (OUTPATIENT)
Age: 65
End: 2025-04-23
Payer: MEDICARE

## 2025-04-23 VITALS
BODY MASS INDEX: 27.94 KG/M2 | SYSTOLIC BLOOD PRESSURE: 124 MMHG | RESPIRATION RATE: 16 BRPM | WEIGHT: 199.6 LBS | OXYGEN SATURATION: 100 % | HEIGHT: 71 IN | HEART RATE: 62 BPM | TEMPERATURE: 97.3 F | DIASTOLIC BLOOD PRESSURE: 62 MMHG

## 2025-04-23 DIAGNOSIS — N18.4 CKD (CHRONIC KIDNEY DISEASE) STAGE 4, GFR 15-29 ML/MIN (HCC): ICD-10-CM

## 2025-04-23 DIAGNOSIS — G89.29 CHRONIC MIDLINE LOW BACK PAIN WITH BILATERAL SCIATICA: ICD-10-CM

## 2025-04-23 DIAGNOSIS — M54.41 CHRONIC MIDLINE LOW BACK PAIN WITH BILATERAL SCIATICA: ICD-10-CM

## 2025-04-23 DIAGNOSIS — M54.42 CHRONIC MIDLINE LOW BACK PAIN WITH BILATERAL SCIATICA: ICD-10-CM

## 2025-04-23 DIAGNOSIS — E11.8 TYPE 2 DIABETES MELLITUS WITH COMPLICATION (HCC): ICD-10-CM

## 2025-04-23 DIAGNOSIS — I10 ESSENTIAL HYPERTENSION: ICD-10-CM

## 2025-04-23 DIAGNOSIS — E11.8 TYPE 2 DIABETES MELLITUS WITH COMPLICATION (HCC): Primary | ICD-10-CM

## 2025-04-23 DIAGNOSIS — M19.90 ARTHRITIS: ICD-10-CM

## 2025-04-23 LAB
ALBUMIN SERPL-MCNC: 4.2 G/DL (ref 3.4–4.8)
ALBUMIN/GLOB SERPL: 1.1 {RATIO} (ref 0.8–2)
ALP SERPL-CCNC: 305 U/L (ref 25–100)
ALT SERPL-CCNC: 54 U/L (ref 4–36)
ANION GAP SERPL CALCULATED.3IONS-SCNC: 18 MMOL/L (ref 3–16)
AST SERPL-CCNC: 44 U/L (ref 8–33)
BILIRUB SERPL-MCNC: 0.6 MG/DL (ref 0.3–1.2)
BUN SERPL-MCNC: 38 MG/DL (ref 6–20)
CALCIUM SERPL-MCNC: 9.5 MG/DL (ref 8.3–10.6)
CHLORIDE SERPL-SCNC: 92 MMOL/L (ref 98–107)
CO2 SERPL-SCNC: 24 MMOL/L (ref 20–30)
CREAT SERPL-MCNC: 4 MG/DL (ref 0.4–1.2)
ERYTHROCYTE [DISTWIDTH] IN BLOOD BY AUTOMATED COUNT: 14.8 % (ref 11–16)
GFR SERPLBLD CREATININE-BSD FMLA CKD-EPI: 16 ML/MIN/{1.73_M2}
GLOBULIN SER CALC-MCNC: 3.9 G/DL
GLUCOSE SERPL-MCNC: 273 MG/DL (ref 74–106)
HBA1C MFR BLD: 9.3 %
HCT VFR BLD AUTO: 41 % (ref 40–54)
HGB BLD-MCNC: 13.2 G/DL (ref 13–18)
MAGNESIUM SERPL-MCNC: 2.5 MG/DL (ref 1.7–2.4)
MCH RBC QN AUTO: 29.9 PG (ref 27–32)
MCHC RBC AUTO-ENTMCNC: 32.2 G/DL (ref 31–35)
MCV RBC AUTO: 92.8 FL (ref 80–100)
PLATELET # BLD AUTO: 117 K/UL (ref 150–400)
PMV BLD AUTO: 10.8 FL (ref 6–10)
POTASSIUM SERPL-SCNC: 4.9 MMOL/L (ref 3.4–5.1)
PROT SERPL-MCNC: 8.1 G/DL (ref 6.4–8.3)
RBC # BLD AUTO: 4.42 M/UL (ref 4.5–6)
SODIUM SERPL-SCNC: 134 MMOL/L (ref 136–145)
WBC # BLD AUTO: 10.3 K/UL (ref 4–11)

## 2025-04-23 PROCEDURE — 2022F DILAT RTA XM EVC RTNOPTHY: CPT | Performed by: FAMILY MEDICINE

## 2025-04-23 PROCEDURE — 3017F COLORECTAL CA SCREEN DOC REV: CPT | Performed by: FAMILY MEDICINE

## 2025-04-23 PROCEDURE — 36415 COLL VENOUS BLD VENIPUNCTURE: CPT

## 2025-04-23 PROCEDURE — 3074F SYST BP LT 130 MM HG: CPT | Performed by: FAMILY MEDICINE

## 2025-04-23 PROCEDURE — 80053 COMPREHEN METABOLIC PANEL: CPT

## 2025-04-23 PROCEDURE — 99214 OFFICE O/P EST MOD 30 MIN: CPT | Performed by: FAMILY MEDICINE

## 2025-04-23 PROCEDURE — 1036F TOBACCO NON-USER: CPT | Performed by: FAMILY MEDICINE

## 2025-04-23 PROCEDURE — 82977 ASSAY OF GGT: CPT

## 2025-04-23 PROCEDURE — 83735 ASSAY OF MAGNESIUM: CPT

## 2025-04-23 PROCEDURE — 85027 COMPLETE CBC AUTOMATED: CPT

## 2025-04-23 PROCEDURE — 3078F DIAST BP <80 MM HG: CPT | Performed by: FAMILY MEDICINE

## 2025-04-23 PROCEDURE — 3046F HEMOGLOBIN A1C LEVEL >9.0%: CPT | Performed by: FAMILY MEDICINE

## 2025-04-23 PROCEDURE — G8417 CALC BMI ABV UP PARAM F/U: HCPCS | Performed by: FAMILY MEDICINE

## 2025-04-23 PROCEDURE — 83036 HEMOGLOBIN GLYCOSYLATED A1C: CPT

## 2025-04-23 PROCEDURE — G8427 DOCREV CUR MEDS BY ELIG CLIN: HCPCS | Performed by: FAMILY MEDICINE

## 2025-04-23 RX ORDER — VENLAFAXINE HYDROCHLORIDE 75 MG/1
75 CAPSULE, EXTENDED RELEASE ORAL DAILY
Qty: 90 CAPSULE | Refills: 3 | Status: SHIPPED | OUTPATIENT
Start: 2025-04-23

## 2025-04-23 RX ORDER — OXYCODONE AND ACETAMINOPHEN 10; 325 MG/1; MG/1
1 TABLET ORAL EVERY 6 HOURS PRN
Qty: 120 TABLET | Refills: 0 | Status: SHIPPED | OUTPATIENT
Start: 2025-04-23 | End: 2025-05-23

## 2025-04-23 NOTE — PROGRESS NOTES
\"Have you been to the ER, urgent care clinic since your last visit?  Hospitalized since your last visit?\"    NO    “Have you seen or consulted any other health care providers outside our system since your last visit?”    Yes Dr. Olson      “Have you had a colorectal cancer screening such as a colonoscopy/FIT/Cologuard?    NO    No colonoscopy on file  No cologuard on file  Date of last FIT: 8/25/2016   No flexible sigmoidoscopy on file     “Have you had a diabetic eye exam?”    NO     Date of last diabetic eye exam: 12/12/2014

## 2025-04-23 NOTE — PROGRESS NOTES
SUBJECTIVE:    Patient ID: Jorge Resendez is a 65 y.o. male.    Chief Complaint   Patient presents with    Hypertension     Reg F/U    Diabetes     Reg F/U       HPI: office visit  History of Present Illness  The patient presents for evaluation of diabetes, hypertension, and dialysis.    He reports a stable condition with no significant changes in blood glucose levels, which are effectively managed through dialysis. Adherence to a dietary regimen is noted, although excessive fluid intake, particularly beer and water, is acknowledged, exceeding the recommended 32 ounces per day. Blood pressure remains within normal limits, with occasional hypotensive episodes post-dialysis, characterized by readings around 100/60. These episodes are accompanied by transient dizziness and require a recovery period.    Tinnitus, described as a popping sensation in the ears, persists for 2 to 3 hours post-dialysis and has been present for the past 2 to 3 months. No adverse reactions to current medications are reported, and no bleeding issues are noted, except for minor bleeding from the arm puncture site during dialysis. No peripheral edema is reported.    Mood remains stable, with no significant irritability. Sleep pattern is regular, and good urinary function is maintained. Occasional dyspnea is reported, which is alleviated by the use of a Breo inhaler. Temporary voice loss post-dialysis is noted, which resolves after a few hours of rest.      Review of Systems   Musculoskeletal:  Positive for back pain and gait problem.   All other systems reviewed and are negative.       OBJECTIVE:  /62   Pulse 62   Temp 97.3 °F (36.3 °C) (Infrared)   Resp 16   Ht 1.803 m (5' 11\")   Wt 90.5 kg (199 lb 9.6 oz)   SpO2 100%   BMI 27.84 kg/m²    Wt Readings from Last 3 Encounters:   04/23/25 90.5 kg (199 lb 9.6 oz)   01/22/25 88.9 kg (196 lb)   10/21/24 95 kg (209 lb 6.4 oz)     BP Readings from Last 3 Encounters:   04/23/25 124/62

## 2025-04-24 LAB — GGT SERPL-CCNC: 419 U/L (ref 8–61)

## 2025-04-25 ENCOUNTER — RESULTS FOLLOW-UP (OUTPATIENT)
Age: 65
End: 2025-04-25

## 2025-04-25 RX ORDER — APIXABAN 2.5 MG/1
2.5 TABLET, FILM COATED ORAL 2 TIMES DAILY
Qty: 180 TABLET | Refills: 3 | Status: SHIPPED | OUTPATIENT
Start: 2025-04-25

## 2025-04-25 NOTE — TELEPHONE ENCOUNTER
Refill request received from pharmacy      Next Office Visit Date:  Future Appointments   Date Time Provider Department Center   7/23/2025 10:00 AM Sarah Logan MD [facility]   8/12/2025  1:30 PM Sarah Logan MD [facility]       [facility] - Please review via PDMP        Last Office Visit:    4/23/2025

## 2025-06-11 DIAGNOSIS — E03.9 HYPOTHYROIDISM, UNSPECIFIED TYPE: ICD-10-CM

## 2025-06-11 RX ORDER — LEVOTHYROXINE SODIUM 100 UG/1
100 TABLET ORAL DAILY
Qty: 90 TABLET | Refills: 3 | Status: SHIPPED | OUTPATIENT
Start: 2025-06-11

## 2025-06-11 NOTE — TELEPHONE ENCOUNTER
Refill request received from pharmacy      Next Office Visit Date:  Future Appointments   Date Time Provider Department Center   7/23/2025 10:00 AM Sarah Logan MD [facility] [place] [facility]   8/12/2025  1:30 PM Sarah Logan MD [facility]       [facility] - Please review via PDMP        Last Office Visit:    4/23/2025

## 2025-06-26 NOTE — TELEPHONE ENCOUNTER
Tacrolimus PA sent to cover my meds The groin was prepped. The site was prepped with ChloraPrep. The site was clipped. The patient was draped.

## 2025-07-23 ENCOUNTER — OFFICE VISIT (OUTPATIENT)
Age: 65
End: 2025-07-23
Payer: MEDICARE

## 2025-07-23 ENCOUNTER — HOSPITAL ENCOUNTER (OUTPATIENT)
Facility: HOSPITAL | Age: 65
Discharge: HOME | End: 2025-07-23
Payer: MEDICARE

## 2025-07-23 VITALS
HEART RATE: 100 BPM | HEIGHT: 71 IN | BODY MASS INDEX: 26.71 KG/M2 | DIASTOLIC BLOOD PRESSURE: 62 MMHG | RESPIRATION RATE: 16 BRPM | OXYGEN SATURATION: 97 % | TEMPERATURE: 97.8 F | SYSTOLIC BLOOD PRESSURE: 108 MMHG | WEIGHT: 190.8 LBS

## 2025-07-23 DIAGNOSIS — E11.8 TYPE 2 DIABETES MELLITUS WITH COMPLICATION (HCC): Primary | ICD-10-CM

## 2025-07-23 DIAGNOSIS — E61.2 MAGNESIUM DEFICIENCY: ICD-10-CM

## 2025-07-23 DIAGNOSIS — I10 ESSENTIAL HYPERTENSION: ICD-10-CM

## 2025-07-23 DIAGNOSIS — R53.82 CHRONIC FATIGUE: ICD-10-CM

## 2025-07-23 DIAGNOSIS — M54.41 CHRONIC MIDLINE LOW BACK PAIN WITH BILATERAL SCIATICA: ICD-10-CM

## 2025-07-23 DIAGNOSIS — I95.9 HYPOTENSION, UNSPECIFIED HYPOTENSION TYPE: ICD-10-CM

## 2025-07-23 DIAGNOSIS — M54.42 CHRONIC MIDLINE LOW BACK PAIN WITH BILATERAL SCIATICA: ICD-10-CM

## 2025-07-23 DIAGNOSIS — F41.9 ANXIETY: ICD-10-CM

## 2025-07-23 DIAGNOSIS — I50.9 CONGESTIVE HEART FAILURE, UNSPECIFIED HF CHRONICITY, UNSPECIFIED HEART FAILURE TYPE (HCC): ICD-10-CM

## 2025-07-23 DIAGNOSIS — M62.838 MUSCLE SPASM: ICD-10-CM

## 2025-07-23 DIAGNOSIS — N18.4 CKD (CHRONIC KIDNEY DISEASE) STAGE 4, GFR 15-29 ML/MIN (HCC): ICD-10-CM

## 2025-07-23 DIAGNOSIS — E11.8 TYPE 2 DIABETES MELLITUS WITH COMPLICATION (HCC): ICD-10-CM

## 2025-07-23 DIAGNOSIS — M19.90 ARTHRITIS: ICD-10-CM

## 2025-07-23 DIAGNOSIS — I48.91 ATRIAL FIBRILLATION, UNSPECIFIED TYPE (HCC): ICD-10-CM

## 2025-07-23 DIAGNOSIS — E11.42 DIABETIC POLYNEUROPATHY ASSOCIATED WITH TYPE 2 DIABETES MELLITUS (HCC): ICD-10-CM

## 2025-07-23 DIAGNOSIS — E78.9 LIPID DISORDER: ICD-10-CM

## 2025-07-23 DIAGNOSIS — G89.29 CHRONIC MIDLINE LOW BACK PAIN WITH BILATERAL SCIATICA: ICD-10-CM

## 2025-07-23 LAB
ALBUMIN SERPL-MCNC: 4 G/DL (ref 3.4–4.8)
ALBUMIN/GLOB SERPL: 1.1 {RATIO} (ref 0.8–2)
ALP SERPL-CCNC: 203 U/L (ref 25–100)
ALT SERPL-CCNC: 26 U/L (ref 4–36)
ANION GAP SERPL CALCULATED.3IONS-SCNC: 14 MMOL/L (ref 3–16)
AST SERPL-CCNC: 28 U/L (ref 8–33)
BILIRUB SERPL-MCNC: 0.7 MG/DL (ref 0.3–1.2)
BUN SERPL-MCNC: 28 MG/DL (ref 6–20)
CALCIUM SERPL-MCNC: 9.3 MG/DL (ref 8.3–10.6)
CHLORIDE SERPL-SCNC: 92 MMOL/L (ref 98–107)
CHOLEST SERPL-MCNC: 86 MG/DL (ref 0–200)
CO2 SERPL-SCNC: 28 MMOL/L (ref 20–30)
CREAT SERPL-MCNC: 3.6 MG/DL (ref 0.8–1.3)
CREAT UR-MCNC: 217 MG/DL (ref 39–259)
ERYTHROCYTE [DISTWIDTH] IN BLOOD BY AUTOMATED COUNT: 15.4 % (ref 11–16)
FOLATE SERPL-MCNC: >20 NG/ML
GFR SERPLBLD CREATININE-BSD FMLA CKD-EPI: 18 ML/MIN/{1.73_M2}
GLOBULIN SER CALC-MCNC: 3.8 G/DL
GLUCOSE SERPL-MCNC: 154 MG/DL (ref 74–106)
HBA1C MFR BLD: 8.3 %
HCT VFR BLD AUTO: 42.1 % (ref 40–54)
HDLC SERPL-MCNC: 38 MG/DL (ref 40–60)
HGB BLD-MCNC: 13 G/DL (ref 13–18)
LDLC SERPL CALC-MCNC: 28 MG/DL
MAGNESIUM SERPL-MCNC: 2.5 MG/DL (ref 1.7–2.4)
MCH RBC QN AUTO: 27.4 PG (ref 27–32)
MCHC RBC AUTO-ENTMCNC: 30.9 G/DL (ref 31–35)
MCV RBC AUTO: 88.6 FL (ref 80–100)
MICROALBUMIN UR DL<=1MG/L-MCNC: 132 MG/DL (ref 0–22)
MICROALBUMIN/CREAT UR: 608.3 MG/G (ref 0–30)
PLATELET # BLD AUTO: 115 K/UL (ref 150–400)
PMV BLD AUTO: 10.4 FL (ref 6–10)
POTASSIUM SERPL-SCNC: 5.2 MMOL/L (ref 3.4–5.1)
PROT SERPL-MCNC: 7.8 G/DL (ref 6.4–8.3)
RBC # BLD AUTO: 4.75 M/UL (ref 4.5–6)
SODIUM SERPL-SCNC: 134 MMOL/L (ref 136–145)
TRIGL SERPL-MCNC: 99 MG/DL (ref 0–249)
VIT B12 SERPL-MCNC: 937 PG/ML (ref 211–911)
VLDLC SERPL CALC-MCNC: 20 MG/DL
WBC # BLD AUTO: 9.9 K/UL (ref 4–11)

## 2025-07-23 PROCEDURE — 3017F COLORECTAL CA SCREEN DOC REV: CPT | Performed by: FAMILY MEDICINE

## 2025-07-23 PROCEDURE — 85027 COMPLETE CBC AUTOMATED: CPT

## 2025-07-23 PROCEDURE — 82607 VITAMIN B-12: CPT

## 2025-07-23 PROCEDURE — 83735 ASSAY OF MAGNESIUM: CPT

## 2025-07-23 PROCEDURE — 3046F HEMOGLOBIN A1C LEVEL >9.0%: CPT | Performed by: FAMILY MEDICINE

## 2025-07-23 PROCEDURE — 99214 OFFICE O/P EST MOD 30 MIN: CPT | Performed by: FAMILY MEDICINE

## 2025-07-23 PROCEDURE — 1036F TOBACCO NON-USER: CPT | Performed by: FAMILY MEDICINE

## 2025-07-23 PROCEDURE — G8427 DOCREV CUR MEDS BY ELIG CLIN: HCPCS | Performed by: FAMILY MEDICINE

## 2025-07-23 PROCEDURE — 3078F DIAST BP <80 MM HG: CPT | Performed by: FAMILY MEDICINE

## 2025-07-23 PROCEDURE — 2022F DILAT RTA XM EVC RTNOPTHY: CPT | Performed by: FAMILY MEDICINE

## 2025-07-23 PROCEDURE — 83036 HEMOGLOBIN GLYCOSYLATED A1C: CPT

## 2025-07-23 PROCEDURE — 82043 UR ALBUMIN QUANTITATIVE: CPT

## 2025-07-23 PROCEDURE — 82570 ASSAY OF URINE CREATININE: CPT

## 2025-07-23 PROCEDURE — 80061 LIPID PANEL: CPT

## 2025-07-23 PROCEDURE — 36415 COLL VENOUS BLD VENIPUNCTURE: CPT

## 2025-07-23 PROCEDURE — 82977 ASSAY OF GGT: CPT

## 2025-07-23 PROCEDURE — 80053 COMPREHEN METABOLIC PANEL: CPT

## 2025-07-23 PROCEDURE — G8417 CALC BMI ABV UP PARAM F/U: HCPCS | Performed by: FAMILY MEDICINE

## 2025-07-23 PROCEDURE — 82330 ASSAY OF CALCIUM: CPT

## 2025-07-23 PROCEDURE — 3074F SYST BP LT 130 MM HG: CPT | Performed by: FAMILY MEDICINE

## 2025-07-23 PROCEDURE — 82746 ASSAY OF FOLIC ACID SERUM: CPT

## 2025-07-23 RX ORDER — MIDODRINE HYDROCHLORIDE 5 MG/1
5 TABLET ORAL
Qty: 42 TABLET | Refills: 2 | Status: SHIPPED | OUTPATIENT
Start: 2025-07-23 | End: 2025-08-06

## 2025-07-23 RX ORDER — OXYCODONE AND ACETAMINOPHEN 10; 325 MG/1; MG/1
1 TABLET ORAL EVERY 6 HOURS PRN
Qty: 120 TABLET | Refills: 0 | Status: SHIPPED | OUTPATIENT
Start: 2025-07-23 | End: 2025-08-22

## 2025-07-23 RX ORDER — VENLAFAXINE HYDROCHLORIDE 150 MG/1
150 CAPSULE, EXTENDED RELEASE ORAL DAILY
Qty: 90 CAPSULE | Refills: 1 | Status: SHIPPED | OUTPATIENT
Start: 2025-07-23

## 2025-07-23 ASSESSMENT — ENCOUNTER SYMPTOMS: BACK PAIN: 1

## 2025-07-23 NOTE — PROGRESS NOTES
Have you been to the ER, urgent care clinic since your last visit?  Hospitalized since your last visit?   No    Have you seen or consulted any other health care providers outside our system since your last visit?   Yes [facility] Dr. Olson      “Have you had a colorectal cancer screening such as a colonoscopy/FIT/Cologuard?    NO    No colonoscopy on file  No cologuard on file  Date of last FIT: 8/25/2016   No flexible sigmoidoscopy on file     “Have you had a diabetic eye exam?”    NO     Date of last diabetic eye exam: 12/12/2014

## 2025-07-23 NOTE — PROGRESS NOTES
SUBJECTIVE:    Patient ID: Jorge Resendez is a 65 y.o. male.    Chief Complaint   Patient presents with    GI Problem     Patient states that he has been vomiting twice a month.     Diabetes     Reg F/U    Other     Patient refused to schedule Colonoscopy       HPI: office visit  History of Present Illness  The patient presents for evaluation of dialysis, low blood pressure, vomiting, leg cramps, depression, and diabetes.    He reports a decline in his health status compared to the previous visit. He has been under the care of a nephrologist for his dialysis port, which occasionally bruises. Dialysis has been challenging for him, causing body cramps and pain. His blood pressure was notably low yesterday post-dialysis, with a reading of 109/50. He experiences lightheadedness and weakness when his blood pressure drops. He is on midodrine, which he takes only on dialysis days, and it usually works well for him. If it does not work, he is given 2 more doses while at the dialysis center. He takes 5 mg about 45 minutes before arriving at the center.    He has been experiencing vomiting, particularly when taking medication on an empty stomach. He also had leg cramps last night, which are common during his dialysis sessions.    His blood sugar level was 183 last Friday. He has been having more frequent bad days, feeling weak and down. He describes his vision as being able to see shadows but lacking clarity. He can walk short distances but requires a wheelchair for longer distances.    He is currently on venlafaxine 75 mg for depression.        Review of Systems   Musculoskeletal:  Positive for back pain and gait problem.   All other systems reviewed and are negative.       OBJECTIVE:  /62   Pulse 100   Temp 97.8 °F (36.6 °C) (Infrared)   Resp 16   Ht 1.803 m (5' 11\")   Wt 86.5 kg (190 lb 12.8 oz)   SpO2 97%   BMI 26.61 kg/m²    Wt Readings from Last 3 Encounters:   07/23/25 86.5 kg (190 lb 12.8 oz)   04/23/25

## 2025-07-24 LAB — GGT SERPL-CCNC: 272 U/L (ref 8–61)

## 2025-07-26 LAB
CA-I ADJ PH7.4 SERPL-SCNC: 1.16 MMOL/L (ref 1.09–1.3)
CA-I SERPL ISE-SCNC: 1.15 MMOL/L (ref 1.09–1.3)

## 2025-08-12 ENCOUNTER — OFFICE VISIT (OUTPATIENT)
Age: 65
End: 2025-08-12
Payer: MEDICARE

## 2025-08-12 VITALS
DIASTOLIC BLOOD PRESSURE: 62 MMHG | HEIGHT: 71 IN | WEIGHT: 185 LBS | HEART RATE: 100 BPM | SYSTOLIC BLOOD PRESSURE: 94 MMHG | OXYGEN SATURATION: 92 % | TEMPERATURE: 97.8 F | RESPIRATION RATE: 16 BRPM | BODY MASS INDEX: 25.9 KG/M2

## 2025-08-12 DIAGNOSIS — M19.90 ARTHRITIS: ICD-10-CM

## 2025-08-12 DIAGNOSIS — M54.41 CHRONIC MIDLINE LOW BACK PAIN WITH BILATERAL SCIATICA: ICD-10-CM

## 2025-08-12 DIAGNOSIS — Z00.00 MEDICARE ANNUAL WELLNESS VISIT, SUBSEQUENT: Primary | ICD-10-CM

## 2025-08-12 DIAGNOSIS — M54.42 CHRONIC MIDLINE LOW BACK PAIN WITH BILATERAL SCIATICA: ICD-10-CM

## 2025-08-12 DIAGNOSIS — G89.29 CHRONIC MIDLINE LOW BACK PAIN WITH BILATERAL SCIATICA: ICD-10-CM

## 2025-08-12 PROCEDURE — G8417 CALC BMI ABV UP PARAM F/U: HCPCS | Performed by: FAMILY MEDICINE

## 2025-08-12 PROCEDURE — G0439 PPPS, SUBSEQ VISIT: HCPCS | Performed by: FAMILY MEDICINE

## 2025-08-12 PROCEDURE — 1036F TOBACCO NON-USER: CPT | Performed by: FAMILY MEDICINE

## 2025-08-12 PROCEDURE — 99213 OFFICE O/P EST LOW 20 MIN: CPT | Performed by: FAMILY MEDICINE

## 2025-08-12 PROCEDURE — 3017F COLORECTAL CA SCREEN DOC REV: CPT | Performed by: FAMILY MEDICINE

## 2025-08-12 PROCEDURE — 3078F DIAST BP <80 MM HG: CPT | Performed by: FAMILY MEDICINE

## 2025-08-12 PROCEDURE — G8427 DOCREV CUR MEDS BY ELIG CLIN: HCPCS | Performed by: FAMILY MEDICINE

## 2025-08-12 PROCEDURE — 3074F SYST BP LT 130 MM HG: CPT | Performed by: FAMILY MEDICINE

## 2025-08-12 RX ORDER — OXYCODONE AND ACETAMINOPHEN 10; 325 MG/1; MG/1
1 TABLET ORAL EVERY 6 HOURS PRN
Qty: 120 TABLET | Refills: 0 | Status: SHIPPED | OUTPATIENT
Start: 2025-08-12 | End: 2025-09-11

## 2025-08-12 ASSESSMENT — PATIENT HEALTH QUESTIONNAIRE - PHQ9
SUM OF ALL RESPONSES TO PHQ QUESTIONS 1-9: 0
1. LITTLE INTEREST OR PLEASURE IN DOING THINGS: NOT AT ALL
SUM OF ALL RESPONSES TO PHQ QUESTIONS 1-9: 0
2. FEELING DOWN, DEPRESSED OR HOPELESS: NOT AT ALL

## 2025-08-12 ASSESSMENT — LIFESTYLE VARIABLES
HOW MANY STANDARD DRINKS CONTAINING ALCOHOL DO YOU HAVE ON A TYPICAL DAY: PATIENT DOES NOT DRINK
HOW OFTEN DO YOU HAVE A DRINK CONTAINING ALCOHOL: NEVER

## 2025-08-15 RX ORDER — FOLIC ACID/VIT B COMPLEX AND C 0.8 MG
1 TABLET ORAL DAILY
Qty: 90 TABLET | Refills: 3 | Status: SHIPPED | OUTPATIENT
Start: 2025-08-15